# Patient Record
Sex: FEMALE | Race: WHITE | Employment: FULL TIME | URBAN - METROPOLITAN AREA
[De-identification: names, ages, dates, MRNs, and addresses within clinical notes are randomized per-mention and may not be internally consistent; named-entity substitution may affect disease eponyms.]

---

## 2017-07-03 PROBLEM — E78.2 MIXED HYPERLIPIDEMIA: Status: ACTIVE | Noted: 2017-07-03

## 2017-07-03 PROBLEM — I25.10 CORONARY ARTERY DISEASE DUE TO LIPID RICH PLAQUE: Status: ACTIVE | Noted: 2017-07-03

## 2017-07-03 PROBLEM — Z98.61 S/P PTCA (PERCUTANEOUS TRANSLUMINAL CORONARY ANGIOPLASTY): Status: ACTIVE | Noted: 2017-07-03

## 2017-07-03 PROBLEM — R07.9 CHEST PAIN: Status: ACTIVE | Noted: 2017-07-03

## 2017-07-03 PROBLEM — I25.83 CORONARY ARTERY DISEASE DUE TO LIPID RICH PLAQUE: Status: ACTIVE | Noted: 2017-07-03

## 2017-07-03 PROBLEM — Z76.89 ENCOUNTER TO ESTABLISH CARE: Status: ACTIVE | Noted: 2017-07-03

## 2017-07-24 PROBLEM — R60.0 LOCALIZED EDEMA: Status: ACTIVE | Noted: 2017-07-24

## 2017-07-24 PROBLEM — R06.02 SOB (SHORTNESS OF BREATH): Status: ACTIVE | Noted: 2017-07-24

## 2017-07-24 PROBLEM — E78.5 DYSLIPIDEMIA: Status: ACTIVE | Noted: 2017-07-24

## 2017-07-27 RX ORDER — AA/PROT/LYSINE/METHIO/VIT C/B6 50-12.5 MG
1 TABLET ORAL DAILY
Status: ON HOLD | COMMUNITY
End: 2019-05-19

## 2017-07-27 NOTE — PROGRESS NOTES
Patient pre-assessment complete for Jina Landers scheduled for [facility], arrival time 0900 am. Patient verified using . Patient instructed to bring all home medications in labeled bottles on the day of procedure. NPO status reinforced. Patient informed to take a full dose aspirin 325mg  or 81 mg x 4 on the day of procedure. Instructed they can take all other medications excluding vitamins & supplements. Patient verbalizes understanding of all instructions & denies any questions at this time.

## 2017-07-28 ENCOUNTER — HOSPITAL ENCOUNTER (OUTPATIENT)
Dept: CARDIAC CATH/INVASIVE PROCEDURES | Age: 57
Setting detail: OBSERVATION
Discharge: HOME | End: 2017-07-29
Attending: INTERNAL MEDICINE | Admitting: INTERNAL MEDICINE
Payer: SELF-PAY

## 2017-07-28 ENCOUNTER — APPOINTMENT (OUTPATIENT)
Dept: ULTRASOUND IMAGING | Age: 57
End: 2017-07-28
Attending: INTERNAL MEDICINE
Payer: SELF-PAY

## 2017-07-28 PROBLEM — M79.606 LEG PAIN: Status: ACTIVE | Noted: 2017-07-28

## 2017-07-28 LAB
ACT BLD: 285 SECS (ref 70–128)
ANION GAP BLD CALC-SCNC: 8 MMOL/L (ref 7–16)
BUN SERPL-MCNC: 15 MG/DL (ref 6–23)
CALCIUM SERPL-MCNC: 8.7 MG/DL (ref 8.3–10.4)
CHLORIDE SERPL-SCNC: 107 MMOL/L (ref 98–107)
CO2 SERPL-SCNC: 29 MMOL/L (ref 21–32)
CREAT SERPL-MCNC: 0.83 MG/DL (ref 0.6–1)
ERYTHROCYTE [DISTWIDTH] IN BLOOD BY AUTOMATED COUNT: 14.5 % (ref 11.9–14.6)
GLUCOSE SERPL-MCNC: 108 MG/DL (ref 65–100)
HCT VFR BLD AUTO: 35.8 % (ref 35.8–46.3)
HGB BLD-MCNC: 12.3 G/DL (ref 11.7–15.4)
INR PPP: 0.9 (ref 0.9–1.2)
MCH RBC QN AUTO: 27.7 PG (ref 26.1–32.9)
MCHC RBC AUTO-ENTMCNC: 34.4 G/DL (ref 31.4–35)
MCV RBC AUTO: 80.6 FL (ref 79.6–97.8)
PLATELET # BLD AUTO: 208 K/UL (ref 150–450)
PMV BLD AUTO: 9 FL (ref 10.8–14.1)
POTASSIUM SERPL-SCNC: 3.7 MMOL/L (ref 3.5–5.1)
PROTHROMBIN TIME: 10.2 SEC (ref 9.6–12)
RBC # BLD AUTO: 4.44 M/UL (ref 4.05–5.25)
SODIUM SERPL-SCNC: 144 MMOL/L (ref 136–145)
WBC # BLD AUTO: 6.1 K/UL (ref 4.3–11.1)

## 2017-07-28 PROCEDURE — 74011250637 HC RX REV CODE- 250/637: Performed by: INTERNAL MEDICINE

## 2017-07-28 PROCEDURE — 74011636320 HC RX REV CODE- 636/320: Performed by: INTERNAL MEDICINE

## 2017-07-28 PROCEDURE — C1769 GUIDE WIRE: HCPCS

## 2017-07-28 PROCEDURE — 74011250636 HC RX REV CODE- 250/636: Performed by: INTERNAL MEDICINE

## 2017-07-28 PROCEDURE — 77030012678 HC VLV HEMSTAS ABBT -B

## 2017-07-28 PROCEDURE — 74011250636 HC RX REV CODE- 250/636

## 2017-07-28 PROCEDURE — 77030004558 HC CATH ANGI DX SUPR TORQ CARD -A

## 2017-07-28 PROCEDURE — 93571 IV DOP VEL&/PRESS C FLO 1ST: CPT

## 2017-07-28 PROCEDURE — 99153 MOD SED SAME PHYS/QHP EA: CPT

## 2017-07-28 PROCEDURE — 85347 COAGULATION TIME ACTIVATED: CPT

## 2017-07-28 PROCEDURE — 74011000258 HC RX REV CODE- 258: Performed by: INTERNAL MEDICINE

## 2017-07-28 PROCEDURE — 77030004534 HC CATH ANGI DX INFN CARD -A

## 2017-07-28 PROCEDURE — 93458 L HRT ARTERY/VENTRICLE ANGIO: CPT

## 2017-07-28 PROCEDURE — C1760 CLOSURE DEV, VASC: HCPCS

## 2017-07-28 PROCEDURE — 99152 MOD SED SAME PHYS/QHP 5/>YRS: CPT

## 2017-07-28 PROCEDURE — 74011000250 HC RX REV CODE- 250: Performed by: INTERNAL MEDICINE

## 2017-07-28 PROCEDURE — 93926 LOWER EXTREMITY STUDY: CPT

## 2017-07-28 PROCEDURE — 85610 PROTHROMBIN TIME: CPT | Performed by: INTERNAL MEDICINE

## 2017-07-28 PROCEDURE — C1887 CATHETER, GUIDING: HCPCS

## 2017-07-28 PROCEDURE — 99218 HC RM OBSERVATION: CPT

## 2017-07-28 PROCEDURE — 85027 COMPLETE CBC AUTOMATED: CPT | Performed by: INTERNAL MEDICINE

## 2017-07-28 PROCEDURE — 77030004559 HC CATH ANGI DX SUPT CARD -B

## 2017-07-28 PROCEDURE — C1894 INTRO/SHEATH, NON-LASER: HCPCS

## 2017-07-28 PROCEDURE — 80048 BASIC METABOLIC PNL TOTAL CA: CPT | Performed by: INTERNAL MEDICINE

## 2017-07-28 PROCEDURE — 76937 US GUIDE VASCULAR ACCESS: CPT

## 2017-07-28 PROCEDURE — 77030013687 HC GD NDL BARD -B

## 2017-07-28 RX ORDER — MIDAZOLAM HYDROCHLORIDE 1 MG/ML
.5-5 INJECTION, SOLUTION INTRAMUSCULAR; INTRAVENOUS
Status: DISCONTINUED | OUTPATIENT
Start: 2017-07-28 | End: 2017-07-28

## 2017-07-28 RX ORDER — SODIUM CHLORIDE 0.9 % (FLUSH) 0.9 %
5-10 SYRINGE (ML) INJECTION AS NEEDED
Status: DISCONTINUED | OUTPATIENT
Start: 2017-07-28 | End: 2017-07-28

## 2017-07-28 RX ORDER — SODIUM CHLORIDE 0.9 % (FLUSH) 0.9 %
5-10 SYRINGE (ML) INJECTION AS NEEDED
Status: DISCONTINUED | OUTPATIENT
Start: 2017-07-28 | End: 2017-07-29 | Stop reason: HOSPADM

## 2017-07-28 RX ORDER — SODIUM CHLORIDE 0.9 % (FLUSH) 0.9 %
5-10 SYRINGE (ML) INJECTION EVERY 8 HOURS
Status: DISCONTINUED | OUTPATIENT
Start: 2017-07-28 | End: 2017-07-28

## 2017-07-28 RX ORDER — HYDROCODONE BITARTRATE AND ACETAMINOPHEN 5; 325 MG/1; MG/1
1 TABLET ORAL
Status: DISCONTINUED | OUTPATIENT
Start: 2017-07-28 | End: 2017-07-28

## 2017-07-28 RX ORDER — LIDOCAINE HYDROCHLORIDE 20 MG/ML
1-20 INJECTION, SOLUTION INFILTRATION; PERINEURAL
Status: DISCONTINUED | OUTPATIENT
Start: 2017-07-28 | End: 2017-07-28

## 2017-07-28 RX ORDER — DIAZEPAM 5 MG/1
5 TABLET ORAL ONCE
Status: COMPLETED | OUTPATIENT
Start: 2017-07-28 | End: 2017-07-28

## 2017-07-28 RX ORDER — CLOPIDOGREL BISULFATE 75 MG/1
75 TABLET ORAL DAILY
Status: DISCONTINUED | OUTPATIENT
Start: 2017-07-29 | End: 2017-07-29 | Stop reason: HOSPADM

## 2017-07-28 RX ORDER — GUAIFENESIN 100 MG/5ML
81-324 LIQUID (ML) ORAL ONCE
Status: DISCONTINUED | OUTPATIENT
Start: 2017-07-28 | End: 2017-07-28

## 2017-07-28 RX ORDER — OXYCODONE AND ACETAMINOPHEN 5; 325 MG/1; MG/1
1 TABLET ORAL ONCE
Status: COMPLETED | OUTPATIENT
Start: 2017-07-28 | End: 2017-07-28

## 2017-07-28 RX ORDER — SODIUM CHLORIDE 9 MG/ML
75 INJECTION, SOLUTION INTRAVENOUS CONTINUOUS
Status: DISCONTINUED | OUTPATIENT
Start: 2017-07-28 | End: 2017-07-28

## 2017-07-28 RX ORDER — ACETAMINOPHEN 325 MG/1
650 TABLET ORAL
Status: DISCONTINUED | OUTPATIENT
Start: 2017-07-28 | End: 2017-07-28

## 2017-07-28 RX ORDER — SODIUM CHLORIDE 0.9 % (FLUSH) 0.9 %
5-10 SYRINGE (ML) INJECTION EVERY 8 HOURS
Status: DISCONTINUED | OUTPATIENT
Start: 2017-07-28 | End: 2017-07-29 | Stop reason: HOSPADM

## 2017-07-28 RX ORDER — TRAZODONE HYDROCHLORIDE 50 MG/1
100 TABLET ORAL
Status: DISCONTINUED | OUTPATIENT
Start: 2017-07-28 | End: 2017-07-29 | Stop reason: HOSPADM

## 2017-07-28 RX ORDER — ESCITALOPRAM OXALATE 10 MG/1
10 TABLET ORAL DAILY
Status: DISCONTINUED | OUTPATIENT
Start: 2017-07-29 | End: 2017-07-29 | Stop reason: HOSPADM

## 2017-07-28 RX ORDER — HEPARIN SODIUM 10000 [USP'U]/ML
40-80 INJECTION, SOLUTION INTRAVENOUS; SUBCUTANEOUS
Status: DISCONTINUED | OUTPATIENT
Start: 2017-07-28 | End: 2017-07-28

## 2017-07-28 RX ORDER — IBUPROFEN 800 MG/1
800 TABLET ORAL 3 TIMES DAILY
Status: DISCONTINUED | OUTPATIENT
Start: 2017-07-28 | End: 2017-07-29 | Stop reason: HOSPADM

## 2017-07-28 RX ORDER — ONDANSETRON 2 MG/ML
4 INJECTION INTRAMUSCULAR; INTRAVENOUS
Status: DISCONTINUED | OUTPATIENT
Start: 2017-07-28 | End: 2017-07-28

## 2017-07-28 RX ORDER — FENTANYL CITRATE 50 UG/ML
25-100 INJECTION, SOLUTION INTRAMUSCULAR; INTRAVENOUS
Status: DISCONTINUED | OUTPATIENT
Start: 2017-07-28 | End: 2017-07-28

## 2017-07-28 RX ORDER — HEPARIN SODIUM 200 [USP'U]/100ML
3 INJECTION, SOLUTION INTRAVENOUS CONTINUOUS
Status: DISCONTINUED | OUTPATIENT
Start: 2017-07-28 | End: 2017-07-28

## 2017-07-28 RX ORDER — ACETAMINOPHEN 325 MG/1
650 TABLET ORAL
Status: DISCONTINUED | OUTPATIENT
Start: 2017-07-28 | End: 2017-07-29 | Stop reason: HOSPADM

## 2017-07-28 RX ORDER — NITROGLYCERIN 0.4 MG/1
0.4 TABLET SUBLINGUAL
Status: DISCONTINUED | OUTPATIENT
Start: 2017-07-28 | End: 2017-07-29 | Stop reason: HOSPADM

## 2017-07-28 RX ORDER — OXYCODONE AND ACETAMINOPHEN 10; 325 MG/1; MG/1
1 TABLET ORAL
Status: DISCONTINUED | OUTPATIENT
Start: 2017-07-28 | End: 2017-07-29 | Stop reason: HOSPADM

## 2017-07-28 RX ORDER — ASPIRIN 325 MG
325 TABLET ORAL DAILY
Status: DISCONTINUED | OUTPATIENT
Start: 2017-07-29 | End: 2017-07-29 | Stop reason: HOSPADM

## 2017-07-28 RX ADMIN — MIDAZOLAM HYDROCHLORIDE 2 MG: 1 INJECTION, SOLUTION INTRAMUSCULAR; INTRAVENOUS at 10:52

## 2017-07-28 RX ADMIN — MIDAZOLAM HYDROCHLORIDE 1 MG: 1 INJECTION, SOLUTION INTRAMUSCULAR; INTRAVENOUS at 10:58

## 2017-07-28 RX ADMIN — FENTANYL CITRATE 25 MCG: 50 INJECTION, SOLUTION INTRAMUSCULAR; INTRAVENOUS at 11:24

## 2017-07-28 RX ADMIN — FENTANYL CITRATE 50 MCG: 50 INJECTION, SOLUTION INTRAMUSCULAR; INTRAVENOUS at 10:52

## 2017-07-28 RX ADMIN — FENTANYL CITRATE 25 MCG: 50 INJECTION, SOLUTION INTRAMUSCULAR; INTRAVENOUS at 10:58

## 2017-07-28 RX ADMIN — HEPARIN SODIUM 6000 UNITS: 10000 INJECTION, SOLUTION INTRAVENOUS; SUBCUTANEOUS at 11:07

## 2017-07-28 RX ADMIN — Medication 10 ML: at 22:16

## 2017-07-28 RX ADMIN — OXYCODONE HYDROCHLORIDE AND ACETAMINOPHEN 1 TABLET: 5; 325 TABLET ORAL at 14:44

## 2017-07-28 RX ADMIN — SODIUM CHLORIDE 75 ML/HR: 900 INJECTION, SOLUTION INTRAVENOUS at 09:32

## 2017-07-28 RX ADMIN — IBUPROFEN 800 MG: 800 TABLET ORAL at 22:16

## 2017-07-28 RX ADMIN — ADENOSINE 140 MCG/KG/MIN: 3 INJECTION, SOLUTION INTRAVENOUS at 11:29

## 2017-07-28 RX ADMIN — MIDAZOLAM HYDROCHLORIDE 1 MG: 1 INJECTION, SOLUTION INTRAMUSCULAR; INTRAVENOUS at 11:24

## 2017-07-28 RX ADMIN — HYDROCODONE BITARTRATE AND ACETAMINOPHEN 1 TABLET: 5; 325 TABLET ORAL at 14:37

## 2017-07-28 RX ADMIN — IOPAMIDOL 110 ML: 755 INJECTION, SOLUTION INTRAVENOUS at 11:42

## 2017-07-28 RX ADMIN — HEPARIN SODIUM 3 ML/HR: 200 INJECTION, SOLUTION INTRAVENOUS at 10:40

## 2017-07-28 RX ADMIN — Medication 5 ML: at 18:13

## 2017-07-28 RX ADMIN — TRAZODONE HYDROCHLORIDE 100 MG: 50 TABLET ORAL at 22:15

## 2017-07-28 RX ADMIN — DIAZEPAM 5 MG: 5 TABLET ORAL at 09:38

## 2017-07-28 RX ADMIN — LIDOCAINE HYDROCHLORIDE 200 MG: 20 INJECTION, SOLUTION INFILTRATION; PERINEURAL at 10:39

## 2017-07-28 NOTE — IP AVS SNAPSHOT
Олег Benson 
 
 
 [address] 
[phone] Patient: Kellie Sanford MRN: [id number] ERT:1/75/3279 You are allergic to the following Allergen Reactions Lortab (Hydrocodone-Acetaminophen) Rash Pcn (Penicillins) Rash Recent Documentation Height Weight Breastfeeding? BMI Smoking Status 1.626 m 85 kg No 32.18 kg/m2 Never Smoker Unresulted Labs Order Current Status METABOLIC PANEL, BASIC In process Emergency Contacts Name Discharge Info Relation Home Work Mobile Carlos Hobbs  Spouse [3] [phone] About your hospitalization You were admitted on:  July 28, 2017 You last received care in the:  [facility] 3 TELEMETRY You were discharged on:  July 29, 2017 Unit phone number:  [phone] Why you were hospitalized Your primary diagnosis was:  Not on File Your diagnoses also included:  Chest Pain, Leg Pain Providers Seen During Your Hospitalizations Provider Role Specialty Primary office phone Chick MD Jonahtan Attending Provider Cardiology [phone] Your Primary Care Physician (PCP) Primary Care Physician Office Phone Office Fax [address]53 [address] [phone] Follow-up Information Follow up With Details Comments Contact Info Chel Castillo MD  Follow up as previously scheduled with Dr Bakari Cross on August 25th at 2:45pm. if you have questions or concerns call the office [facility]  [address] [place] [address] 
[phone] Delia Portillo MD Schedule an appointment as soon as possible for a visit  [address] 
[phone] Your Appointments Friday August 25, 2017  2:45 PM EDT HOSPITAL FOLLOW-UP with Chel Castillo MD  
[facility] Card[facility] ([facility]) [address] [place] [address]  
[phone] Current Discharge Medication List  
  
START taking these medications Dose & Instructions Dispensing Information Comments Morning Noon Evening Bedtime  
 ibuprofen 800 mg tablet Commonly known as:  MOTRIN Your last dose was: Your next dose is:    
   
   
 Dose:  800 mg Take 1 Tab by mouth three (3) times daily for 7 days. Quantity:  21 Tab Refills:  0  
     
   
   
   
  
 pantoprazole 20 mg tablet Commonly known as:  PROTONIX Your last dose was: Your next dose is:    
   
   
 Dose:  20 mg Take 1 Tab by mouth daily. Quantity:  14 Tab Refills:  0 CONTINUE these medications which have NOT CHANGED Dose & Instructions Dispensing Information Comments Morning Noon Evening Bedtime  
 aspirin 325 mg tablet Commonly known as:  ASPIRIN Your last dose was: Your next dose is:    
   
   
 Dose:  325 mg Take 325 mg by mouth daily. Refills:  0  
     
   
   
   
  
 CO Q-10 10 mg Cap Generic drug:  coenzyme q10 Your last dose was: Your next dose is:    
   
   
 Dose:  1 Tab Take 1 Tab by mouth daily. Refills:  0 LEXAPRO 10 mg tablet Generic drug:  escitalopram oxalate Your last dose was: Your next dose is:    
   
   
 Dose:  10 mg Take 10 mg by mouth daily. Refills:  0  
     
   
   
   
  
 nitroglycerin 0.4 mg SL tablet Commonly known as:  NITROSTAT Your last dose was: Your next dose is:    
   
   
 Dose:  0.4 mg  
1 Tab by SubLINGual route every five (5) minutes as needed for Chest Pain. Quantity:  30 Tab Refills:  5 PLAVIX 75 mg Tab Generic drug:  clopidogrel Your last dose was: Your next dose is: Take  by mouth. Refills:  0  
     
   
   
   
  
 traZODone 100 mg tablet Commonly known as:  Mendoza Castro Your last dose was: Your next dose is: Dose:  100 mg Take 100 mg by mouth nightly. Indications: Pt takes 2 tablets nightly Refills:  0 Where to Get Your Medications Information on where to get these meds will be given to you by the nurse or doctor. ! Ask your nurse or doctor about these medications  
  ibuprofen 800 mg tablet  
 pantoprazole 20 mg tablet Discharge Instructions HEART CATHETERIZATION/ANGIOGRAPHY DISCHARGE INSTRUCTIONS 1. Check puncture site frequently for swelling or bleeding. If there is any bleeding, lie down and apply pressure over the area with a clean towel or washcloth. Notify your doctor for any redness, swelling, drainage, or oozing from the puncture site. Notify your doctor for any fever or chills. 2. If the extremity becomes cold, numb, or painful call [facility] Cardiology at [phone]. 
3. Activity should be limited for the next 48 hours. Climb stairs as little as possible and avoid any stooping, bending, or strenuous activity for 48 hours. No heavy lifting (anything over 10 pounds) for 3 days. 4. You may resume your usual diet. Drink more fluids than usual. 
5. Have a responsible person drive you home and stay with you for at least 24 hours after your heart catheterization/angiography. 6. You may remove bandage from your Right Groin in 24 hours. You may shower in 24 hours. No tub baths, hot tubs, or swimming for 1 week. Do not place any lotions, creams, powders, or ointments over puncture site for 1 week. You may place a clean band-aid over the puncture site each day for 5 days. Change daily. I have read the above instructions and have had the opportunity to ask questions. Cardiac Catheterization/Angiography Discharge Instructions *Check the puncture site frequently for swelling or bleeding.  If you see any bleeding, lie down and apply pressure over the area with a clean town or washcloth. Notify your doctor for any redness, swelling, drainage or oozing from the puncture site. Notify your doctor for any fever or chills. *If the leg or arm with the puncture becomes cold, numb or painful, call [facility] Cardiology  at  [phone] *Activity should be limited for the next 48 hours. Climb stairs as little as possible and avoid any stooping, bending or strenuous activity for 48 hours. No heavy lifting (anything over 10 pounds) for three days. *Do not drive for 48 hours. *You may resume your usual diet. Drink more fluids than usual. 
 
*Have a responsible person drive you home and stay with you for at least 24 hours after your heart catheterization/angiography. *You may remove the bandage from your Right, Left, Groin and Arm in 24 hours. You may shower in 24 hours. No tub baths, hot tubs or swimming for one week. Do not place any lotions, creams, powders, ointments over the puncture site for one week. You may place a clean band-aid over the puncture site each day for 5 days. Change this daily. DISCHARGE SUMMARY from Nurse The following personal items are in your possession at time of discharge: 
 
Dental Appliances: Lowers, Uppers, With patient Visual Aid: Glasses, With patient Clothing: Pants, Shirt, Undergarments, With patient PATIENT INSTRUCTIONS: 
 
 
F-face looks uneven A-arms unable to move or move unevenly S-speech slurred or non-existent T-time-call 911 as soon as signs and symptoms begin-DO NOT go Back to bed or wait to see if you get better-TIME IS BRAIN. Warning Signs of HEART ATTACK Call 911 if you have these symptoms: 
? Chest discomfort. Most heart attacks involve discomfort in the center of the chest that lasts more than a few minutes, or that goes away and comes back. It can feel like uncomfortable pressure, squeezing, fullness, or pain. ? Discomfort in other areas of the upper body. Symptoms can include pain or discomfort in one or both arms, the back, neck, jaw, or stomach. ? Shortness of breath with or without chest discomfort. ? Other signs may include breaking out in a cold sweat, nausea, or lightheadedness. Don't wait more than five minutes to call [address]! Fast action can save your life. Calling 911 is almost always the fastest way to get lifesaving treatment. Emergency Medical Services staff can begin treatment when they arrive  up to an hour sooner than if someone gets to the hospital by car. The discharge information has been reviewed with the patient. The patient verbalized understanding. Discharge medications reviewed with the patient and appropriate educational materials and side effects teaching were provided. Discharge Orders None Science Fantasy Announcement We are excited to announce that we are making your provider's discharge notes available to you in Science Fantasy. You will see these notes when they are completed and signed by the physician that discharged you from your recent hospital stay. If you have any questions or concerns about any information you see in Science Fantasy, please call the Health Information Department where you were seen or reach out to your Primary Care Provider for more information about your plan of care. Introducing \[facility]\"" & HEALTH SERVICES! Dear Sammy Scruggs: Thank you for requesting a Science Fantasy account. Our records indicate that you already have an active Science Fantasy account. You can access your account anytime at [email or web address] Did you know that you can access your hospital and ER discharge instructions at any time in Science Fantasy? You can also review all of your test results from your hospital stay or ER visit. Additional Information If you have questions, please visit the Frequently Asked Questions section of the [facility] website at [email or web address] Remember, MyChart is NOT to be used for urgent needs. For medical emergencies, dial 911. Now available from your iPhone and Android! General Information Please provide this summary of care documentation to your next provider. Patient Signature:  ____________________________________________________________ Date:  ____________________________________________________________  
  
Daniela Piety Provider Signature:  ____________________________________________________________ Date:  ____________________________________________________________

## 2017-07-28 NOTE — PROGRESS NOTES
Patient with severe pain at cath site. Unable to ambulate. Ultrasound normal.  Good pulses. Feels she cannot go home. Will admit and start anti-inflammatory medications.   Hopefully home in AM.     Quinn Lesches, MD

## 2017-07-28 NOTE — PROCEDURES
Brief Cardiac Procedure Note    Patient: Jhonny Ly MRN: [id number]  SSN: [id number]    YOB: 1960  Age: 65 y.o. Sex: female      Date of Procedure: 7/28/2017     Pre-procedure Diagnosis: Chest pain     Post-procedure Diagnosis: Non-cardiac Chest Pain    Procedure: Left Heart Catheterization    Brief Description of Procedure: As above    Performed By: Nusrat Trejo MD     Assistants: None    Anesthesia: Moderate Sedation    Estimated Blood Loss: Less than 10 mL      Specimens: None    Implants: None    Findings: 40-50% diffuse prox LAD around stent. Moderate non-obstructive CAD. Normal EF    Complications: None    Recommendations: Continue medical therapy.     Signed By: Nusrat Trejo MD     July 28, 2017

## 2017-07-28 NOTE — PROGRESS NOTES
Patient received to [address] [place] [address] room # 7  Ambulatory from [facility]. Patient scheduled for [facility] today with Dr Desmond Dong. Procedure reviewed & questions answered, voiced good understanding consent obtained & placed on chart. All medications and medical history reviewed. Will prep patient per orders. Patient & family updated on plan of care.

## 2017-07-28 NOTE — PROGRESS NOTES
Report received from [facility] Cath Lab RN. Procedural findings communicated. Intra procedural  medication administration reviewed. Progression of care discussed.      Patient received into [address] [facility] 5 post sheath removal.     Access site without bleeding or swelling     Dressing dry and intact     Patient instructed to limit movement to right lower extremity    Routine post procedural vital signs and site assessment initiated

## 2017-07-28 NOTE — PROGRESS NOTES
Patient with complaints of right groin pain. Site palpated no bleeding or hematoma noted. Eleuterio Peña previously ordered by Dr Hermes Sanchez patient states unable to take norco due to allergy (causes itching) states has taken percocet without difficulty. Dr Hermes Sanchez notified Zita Hassan to give percocet 5mg.

## 2017-07-28 NOTE — PROGRESS NOTES
TRANSFER - OUT REPORT:    Verbal report given to Ovi Glez, RN(name) on Ana Maria Manzanoget  being transferred to cpru(unit) for routine progression of care       Report consisted of patients Situation, Background, Assessment and   Recommendations(SBAR). Information from the following report(s) SBAR was reviewed with the receiving nurse. is allergic to lortab [hydrocodone-acetaminophen] and pcn [penicillins]. Opportunity for questions and clarification was provided. Procedure Summary:Pt had LHC with FFR of LAD via R groin. Site angiosealed and covered with clear dsg. Med Administration    Versed:  4 mg  Fentanyl: 100 mcg  Heparin: 6000 units      Visit Vitals    /82 (BP 1 Location: Left arm, BP Patient Position: Supine)    Pulse 79    Temp 97.8 °F (36.6 °C)    Resp 14    Ht 5' 4\" (1.626 m)    Wt 81.6 kg (180 lb)    SpO2 95%    Breastfeeding No    BMI 30.9 kg/m2     Past Medical History:   Diagnosis Date    CAD (coronary artery disease)     Psychiatric disorder            Peripheral IV 07/28/17 Left Antecubital (Active)   Site Assessment Clean, dry, & intact; Clean;Dry 7/28/2017  9:40 AM   Phlebitis Assessment 0 7/28/2017  9:40 AM   Infiltration Assessment 0 7/28/2017  9:40 AM   Dressing Status Clean, dry, & intact; Clean;Dry 7/28/2017  9:40 AM   Dressing Type Tape;Transparent 7/28/2017  9:40 AM   Hub Color/Line Status Patent; Infusing;Pink 7/28/2017  9:40 AM

## 2017-07-28 NOTE — PROGRESS NOTES
Patient received to [address] [place] [address] room # 7  Ambulatory from [facility]. Patient scheduled for [facility] today with Dr Cassy Frias. Procedure reviewed & questions answered, voiced good understanding consent obtained & placed on chart. All medications and medical history reviewed. Will prep patient per orders. Patient & family updated on plan of care.

## 2017-07-28 NOTE — IP AVS SNAPSHOT
[facility] 
 
 
 [address] 
[phone] Patient: Zhang Clinton MRN: [id number] ICF:8/26/3129 Current Discharge Medication List  
  
START taking these medications Dose & Instructions Dispensing Information Comments Morning Noon Evening Bedtime  
 ibuprofen 800 mg tablet Commonly known as:  MOTRIN Your last dose was: Your next dose is:    
   
   
 Dose:  800 mg Take 1 Tab by mouth three (3) times daily for 7 days. Quantity:  21 Tab Refills:  0  
     
   
   
   
  
 pantoprazole 20 mg tablet Commonly known as:  PROTONIX Your last dose was: Your next dose is:    
   
   
 Dose:  20 mg Take 1 Tab by mouth daily. Quantity:  14 Tab Refills:  0 CONTINUE these medications which have NOT CHANGED Dose & Instructions Dispensing Information Comments Morning Noon Evening Bedtime  
 aspirin 325 mg tablet Commonly known as:  ASPIRIN Your last dose was: Your next dose is:    
   
   
 Dose:  325 mg Take 325 mg by mouth daily. Refills:  0  
     
   
   
   
  
 CO Q-10 10 mg Cap Generic drug:  coenzyme q10 Your last dose was: Your next dose is:    
   
   
 Dose:  1 Tab Take 1 Tab by mouth daily. Refills:  0 LEXAPRO 10 mg tablet Generic drug:  escitalopram oxalate Your last dose was: Your next dose is:    
   
   
 Dose:  10 mg Take 10 mg by mouth daily. Refills:  0  
     
   
   
   
  
 nitroglycerin 0.4 mg SL tablet Commonly known as:  NITROSTAT Your last dose was: Your next dose is:    
   
   
 Dose:  0.4 mg  
1 Tab by SubLINGual route every five (5) minutes as needed for Chest Pain. Quantity:  30 Tab Refills:  5 PLAVIX 75 mg Tab Generic drug:  clopidogrel Your last dose was: Your next dose is: Take  by mouth. Refills:  0  
     
   
   
   
  
 traZODone 100 mg tablet Commonly known as:  Rayma Medal Your last dose was: Your next dose is:    
   
   
 Dose:  100 mg Take 100 mg by mouth nightly. Indications: Pt takes 2 tablets nightly Refills:  0 Where to Get Your Medications Information on where to get these meds will be given to you by the nurse or doctor. ! Ask your nurse or doctor about these medications  
  ibuprofen 800 mg tablet  
 pantoprazole 20 mg tablet

## 2017-07-28 NOTE — IP AVS SNAPSHOT
[address] 
[phone] Patient: Aneudy Kidd MRN: [id number] HNJ:[birth year] Discharge Summary 7/28/2017 Aneudy Kidd MRN[de-identified]  [id number] Admission Information Provider Pager Service Admission Date Expected D/C Date Kelly Yusuf MD  CARDIAC CATH LAB 7/28/2017 Actual LOS Patient Class 0 days OUTPATIENT Follow-up Information Follow up With Details Comments Contact Info Shakira Brock MD  Follow up as previously scheduled with Dr Trever Daniel on August 25th at 2:45pm. if you have questions or concerns call the office [address] [address] 
[phone] Current Discharge Medication List  
  
ASK your doctor about these medications Dose & Instructions Dispensing Information Comments Morning Noon Evening Bedtime  
 aspirin 325 mg tablet Commonly known as:  ASPIRIN Your last dose was: Your next dose is:    
   
   
 Dose:  325 mg Take 325 mg by mouth daily. Refills:  0  
     
   
   
   
  
 CO Q-10 10 mg Cap Generic drug:  coenzyme q10 Your last dose was: Your next dose is:    
   
   
 Dose:  1 Tab Take 1 Tab by mouth daily. Refills:  0 LEXAPRO 10 mg tablet Generic drug:  escitalopram oxalate Your last dose was: Your next dose is:    
   
   
 Dose:  10 mg Take 10 mg by mouth daily. Refills:  0  
     
   
   
   
  
 nitroglycerin 0.4 mg SL tablet Commonly known as:  NITROSTAT Your last dose was: Your next dose is:    
   
   
 Dose:  0.4 mg  
1 Tab by SubLINGual route every five (5) minutes as needed for Chest Pain. Quantity:  30 Tab Refills:  5 PLAVIX 75 mg Tab Generic drug:  clopidogrel Your last dose was: Your next dose is: Take  by mouth. Refills:  0  
     
   
   
   
  
 traZODone 100 mg tablet Commonly known as:  Alyce Rodriguez Your last dose was: Your next dose is:    
   
   
 Dose:  100 mg Take 100 mg by mouth nightly. Indications: Pt takes 2 tablets nightly Refills:  0 General Information Please provide this summary of care documentation to your next provider. Allergies Unspecified:  Lortab [Hydrocodone-acetaminophen];  Pcn [Penicillins] Current Immunizations  Never Reviewed No immunizations on file. Discharge Instructions Discharge Instructions HEART CATHETERIZATION/ANGIOGRAPHY DISCHARGE INSTRUCTIONS 1. Check puncture site frequently for swelling or bleeding. If there is any bleeding, lie down and apply pressure over the area with a clean towel or washcloth. Notify your doctor for any redness, swelling, drainage, or oozing from the puncture site. Notify your doctor for any fever or chills. 2. If the extremity becomes cold, numb, or painful call [facility] Cardiology at [phone]. 
3. Activity should be limited for the next 48 hours. Climb stairs as little as possible and avoid any stooping, bending, or strenuous activity for 48 hours. No heavy lifting (anything over 10 pounds) for 3 days. 4. You may resume your usual diet. Drink more fluids than usual. 
5. Have a responsible person drive you home and stay with you for at least 24 hours after your heart catheterization/angiography. 6. You may remove bandage from your Right Groin in 24 hours. You may shower in 24 hours. No tub baths, hot tubs, or swimming for 1 week. Do not place any lotions, creams, powders, or ointments over puncture site for 1 week. You may place a clean band-aid over the puncture site each day for 5 days. Change daily. I have read the above instructions and have had the opportunity to ask questions. Discharge Orders  None  
  
`  
  
    
    
 Patient Signature:  ____________________________________________________________ Date:  ____________________________________________________________  
  
 Edrie Gunnels Provider Signature:  ____________________________________________________________ Date:  ____________________________________________________________

## 2017-07-28 NOTE — PROGRESS NOTES
Patient ambulated to bathroom at 1510. Patient with complaints of severe groin pain, difficulty returning to room/bed and patient started to tremble and began to cry. Dr Sang Villavicencio notified. Order given for patient to have ultrasound of right groin.  Site remains soft to touch with no bleeding or hematoma

## 2017-07-28 NOTE — PROGRESS NOTES
TRANSFER - IN REPORT:    Verbal report received from MERLIN Michaels on Jose Macedo being received from [facility] for routine progression of care. Report consisted of patients Situation, Background, Assessment and Recommendations(SBAR). Information from the following reports was reviewed: Kardex, Procedure Summary, MAR and Recent Results. Opportunity for questions and clarification was provided. Assessment completed upon patients arrival to unit and care assumed. Patient received to room 324 and assessment completed. Patient connected to telemetry monitor and eagle with BP cycling every 15 minutes. Patient oriented to room and plan of care reviewed. Patient voiced understanding of bedrest. Right groin site benign, dressing dry and intact, no hematoma. Patient provided with clear liquids. Patient voiced understanding to use call light to communicate needs. Admission skin assessment completed with second RN and reveals the following: Right groin site soft with soft purple bruising. Patient states right groin is hurting 8/10- \"unlike any other pain I have ever had. \" Too early for additional pain medication- will monitor.

## 2017-07-28 NOTE — PROGRESS NOTES
Report called to Danial Manriquez. Patient transferred to room 324 via hospital transport. Right groin remains soft with no bleeding or  Hematoma.  Patient alert with no other complaints other than right groin pain

## 2017-07-28 NOTE — PROGRESS NOTES
Verbal bedside report given to katlin Dickey RN. Patient's situation, background, assessment and recommendations provided. Opportunity for questions provided. Oncoming RN assumed care of patient. Pt up in bathroom.

## 2017-07-28 NOTE — PROCEDURES
Zafar Torresjackie 45       Name:  Jackie Titus   MR#:  [id number]   :  1960   Account #:  [de-identified]   Date of Adm:  2017       DATE OF PROCEDURE: 2017     PROCEDURE    1. Left heart catheterization, selective coronary angiography,   left ventriculogram via the right femoral approach. 2. Fractional flow reserve measurement of the left anterior   descending. 3. AngioSeal vascular closure of arteriotomy site. INDICATIONS: Recurrent chest pain in a patient with prior PCI   and stenting of the LAD. Chest pain consistent with [place]   Cardiovascular Class 4 angina as it occurs at rest and with   exertion. Stress test was equivocal with lateral changes. Prior   history of PCI and stenting of the LAD. TECHNICAL FACTORS: After informed consent was obtained, patient   brought to the cardiac catheterization lab. The right femoral   artery was prepped and draped in the usual sterile fashion. Using   the Site-Rite ultrasound, the right common femoral artery was   identified. Using modified Seldinger technique, the right common   femoral artery was entered under Site-Rite guidance. A static   image was placed on the chart. A 6-[place] arterial sheath was   placed without difficulty. Sami Breann catheter was used to   selectively engage the ostium of the left main coronary artery   and a 3DRC catheter was used to selectively engage the ostium of   the right coronary artery. Selective injection in various   projections were performed. A pigtail catheter was used to cross   the aortic valve and enter the left ventricle. Hemodynamic   measurements and left ventriculogram were obtained. The left   ventricular aortic pressure gradient was obtained by pullback   technique. At this point, the patient was given 6000 units of heparin. ACT   was greater than 250 and a XB 3.0 guide was used to selectively   the ostium of the left main coronary artery.  There was   inadequate support, therefore this exchange for a XB 3.5 guide. A Verrata wire system was advanced into the ostium of the left   main coronary artery and appropriately normalized. This was then   placed in the mid to distal LAD. Intravenous Adenosine was given at 140   mg/kg per minute. This was given with appropriate hemodynamic   response for 2 minutes. There was intermittent AV block. FFR was   measured at 0.88 indicating hemodynamically insignificant   stenosis of the proximal LAD. Based on this, it was not felt   intervention was warranted and the wire was moved and final   projections were obtained. A right femoral arteriogram was performed that showed the sheath   was contained in the right common femoral artery. A 6-[place]   AngioSeal vascular closure device was deployed by standard   technique with excellent hemostasis. SEDATION: Patient received monitored supervised conscious   sedation with 3 mg of Versed and 75 mcg of fentanyl. This was   administered by Indiana Garcia RN. Sedation start time was 10:50   with a procedure end time of 11:44. CONTRAST: Isovue 110. HEMODYNAMIC RESULTS    1. Aortic pressure is 115/67 with a mean of 90.    2. Left ventricular end-diastolic pressure is 19.    3. There is no significant gradient across the aortic valve. ANGIOGRAPHIC RESULTS    1. Left main coronary artery: A large caliber vessel, appeared   angiographically normal.    2. LAD: A small caliber vessel. Contains a stent in the proximal   segment. There is a 40% to 50% stenosis just in front of the   stented segment extending to the LAD ostium. The remainder of the   vessel is very small caliber and approximately 2 to 2.25 mm. 3. First diagonal artery: A small caliber vessel. Contains a 30%   ostial stenosis. 4. Left circumflex: A medium caliber vessel. 20% distal stenosis   prior to termination to an obtuse marginal which is patent.     5. Right coronary artery: Large caliber, dominant vessel. 20% to   30% proximal and 30% mid stenosis. 6. Right PDA: Small caliber vessel. Patent. 7. Right posterolateral branch: Medium caliber vessel. patent. 8. Left ventriculogram performed in a FAN projection shows a   normal left ventricular systolic function, EF 83% to 65%. No   focal segmental wall motion abnormalities. The aortic root is   nondilated. 9. FFR of LAD is 0.88, indicating hemodynamically insignificant   stenosis. CONCLUSIONS    1. Patent left anterior descending stent with moderate   nonobstructive disease in other myocardial segments. Borderline   ostial left anterior descending stenosis which was showed normal   hemodynamics with fractional flow reserve measurement of 0.88. Also it should be noted that patient recently had a stress test   which showed no evidence of anterior ischemia. 2. Normal left ventricular systolic function. 3. Successful deployment of 6-[place] AngioSeal vascular device with   good hemostasis. PLAN: Followup with Dr. Selma Srinivasan in 2 weeks.          MD NILSA Ho / Mikel Marroquin   D:  07/28/2017   12:51   T:  07/28/2017   14:23   Job #:  [id number]

## 2017-07-29 VITALS
OXYGEN SATURATION: 98 % | HEART RATE: 60 BPM | SYSTOLIC BLOOD PRESSURE: 114 MMHG | RESPIRATION RATE: 16 BRPM | BODY MASS INDEX: 32.01 KG/M2 | TEMPERATURE: 97.5 F | DIASTOLIC BLOOD PRESSURE: 63 MMHG | HEIGHT: 64 IN | WEIGHT: 187.5 LBS

## 2017-07-29 LAB
ANION GAP BLD CALC-SCNC: 7 MMOL/L (ref 7–16)
BUN SERPL-MCNC: 17 MG/DL (ref 6–23)
CALCIUM SERPL-MCNC: 8.7 MG/DL (ref 8.3–10.4)
CHLORIDE SERPL-SCNC: 110 MMOL/L (ref 98–107)
CO2 SERPL-SCNC: 27 MMOL/L (ref 21–32)
CREAT SERPL-MCNC: 0.86 MG/DL (ref 0.6–1)
ERYTHROCYTE [DISTWIDTH] IN BLOOD BY AUTOMATED COUNT: 14.5 % (ref 11.9–14.6)
GLUCOSE SERPL-MCNC: 122 MG/DL (ref 65–100)
HCT VFR BLD AUTO: 34.6 % (ref 35.8–46.3)
HGB BLD-MCNC: 11.5 G/DL (ref 11.7–15.4)
MCH RBC QN AUTO: 27.2 PG (ref 26.1–32.9)
MCHC RBC AUTO-ENTMCNC: 33.2 G/DL (ref 31.4–35)
MCV RBC AUTO: 81.8 FL (ref 79.6–97.8)
PLATELET # BLD AUTO: 183 K/UL (ref 150–450)
PMV BLD AUTO: 9.2 FL (ref 10.8–14.1)
POTASSIUM SERPL-SCNC: 3.9 MMOL/L (ref 3.5–5.1)
RBC # BLD AUTO: 4.23 M/UL (ref 4.05–5.25)
SODIUM SERPL-SCNC: 144 MMOL/L (ref 136–145)
WBC # BLD AUTO: 6.2 K/UL (ref 4.3–11.1)

## 2017-07-29 PROCEDURE — 80048 BASIC METABOLIC PNL TOTAL CA: CPT | Performed by: INTERNAL MEDICINE

## 2017-07-29 PROCEDURE — 85027 COMPLETE CBC AUTOMATED: CPT | Performed by: INTERNAL MEDICINE

## 2017-07-29 PROCEDURE — 74011250637 HC RX REV CODE- 250/637: Performed by: INTERNAL MEDICINE

## 2017-07-29 PROCEDURE — 99218 HC RM OBSERVATION: CPT

## 2017-07-29 PROCEDURE — 36415 COLL VENOUS BLD VENIPUNCTURE: CPT | Performed by: INTERNAL MEDICINE

## 2017-07-29 RX ORDER — IBUPROFEN 800 MG/1
800 TABLET ORAL 3 TIMES DAILY
Qty: 21 TAB | Refills: 0 | Status: SHIPPED | OUTPATIENT
Start: 2017-07-29 | End: 2017-08-05

## 2017-07-29 RX ORDER — PANTOPRAZOLE SODIUM 20 MG/1
20 TABLET, DELAYED RELEASE ORAL DAILY
Qty: 14 TAB | Refills: 0 | Status: ON HOLD | OUTPATIENT
Start: 2017-07-29 | End: 2019-05-19

## 2017-07-29 RX ADMIN — ASPIRIN 325 MG ORAL TABLET 325 MG: 325 PILL ORAL at 08:47

## 2017-07-29 RX ADMIN — IBUPROFEN 800 MG: 800 TABLET ORAL at 08:47

## 2017-07-29 RX ADMIN — CLOPIDOGREL BISULFATE 75 MG: 75 TABLET ORAL at 08:48

## 2017-07-29 RX ADMIN — ESCITALOPRAM OXALATE 10 MG: 10 TABLET ORAL at 08:47

## 2017-07-29 NOTE — PROGRESS NOTES
Discharge instructions reviewed with pt. Prescriptions given for Motrin protonix and med info sheets provided for all new medications. Opportunity for questions provided. Pt voiced understanding of all discharge instructions.

## 2017-07-29 NOTE — PROGRESS NOTES
Verbal bedside report received from Leopoldo Raw, RN. Assumed care of patient. Received a call from Monitor room that pt HR was in the 40's. Nicki Morillo on the unit and aware.  Will ambulate pt to see if HR comes up or stays the same

## 2017-07-29 NOTE — PROGRESS NOTES
[facility] July 29, 2017       RE: Diana Duarte      To Whom It May Concern,    This is to certify that Diana Duarte may return to work August 2 with no restrictions. Please feel free to contact my office if you have any questions or concerns. Thank you for your assistance in this matter.       Sincerely,  Perry Veras NP

## 2017-07-29 NOTE — PROGRESS NOTES
Problem: Falls - Risk of  Goal: *Absence of falls  Outcome: Progressing Towards Goal  Fall precautions in place. Yellow socks on. Instructed to only get OOB with assistance. Voiced understanding. Call light in reach. Goal: *Knowledge of fall prevention  Outcome: Progressing Towards Goal  Pt educated on fall prevention and to use call light for assistance. Pt verbalizes understanding and will use call light for assistance.

## 2017-07-29 NOTE — PROGRESS NOTES
Bedside and Verbal shift change report given to Honora Schwab, RN (oncoming nurse) by Katie Reyes RN (offgoing nurse). Report included the following information SBAR, Kardex, Accordion and Recent Results.

## 2017-07-29 NOTE — DISCHARGE INSTRUCTIONS
HEART CATHETERIZATION/ANGIOGRAPHY DISCHARGE INSTRUCTIONS    1. Check puncture site frequently for swelling or bleeding. If there is any bleeding, lie down and apply pressure over the area with a clean towel or washcloth. Notify your doctor for any redness, swelling, drainage, or oozing from the puncture site. Notify your doctor for any fever or chills. 2. If the extremity becomes cold, numb, or painful call [address] Cardiology at [phone].  3. Activity should be limited for the next 48 hours. Climb stairs as little as possible and avoid any stooping, bending, or strenuous activity for 48 hours. No heavy lifting (anything over 10 pounds) for 3 days. 4. You may resume your usual diet. Drink more fluids than usual.  5. Have a responsible person drive you home and stay with you for at least 24 hours after your heart catheterization/angiography. 6. You may remove bandage from your Right Groin in 24 hours. You may shower in 24 hours. No tub baths, hot tubs, or swimming for 1 week. Do not place any lotions, creams, powders, or ointments over puncture site for 1 week. You may place a clean band-aid over the puncture site each day for 5 days. Change daily. I have read the above instructions and have had the opportunity to ask questions. Cardiac Catheterization/Angiography Discharge Instructions    *Check the puncture site frequently for swelling or bleeding. If you see any bleeding, lie down and apply pressure over the area with a clean town or washcloth. Notify your doctor for any redness, swelling, drainage or oozing from the puncture site. Notify your doctor for any fever or chills. *If the leg or arm with the puncture becomes cold, numb or painful, call [address] Cardiology  at  [phone]    *Activity should be limited for the next 48 hours. Climb stairs as little as possible and avoid any stooping, bending or strenuous activity for 48 hours.  No heavy lifting (anything over 10 pounds) for three days.    *Do not drive for 48 hours. *You may resume your usual diet. Drink more fluids than usual.    *Have a responsible person drive you home and stay with you for at least 24 hours after your heart catheterization/angiography. *You may remove the bandage from your Right, Left, Groin and Arm in 24 hours. You may shower in 24 hours. No tub baths, hot tubs or swimming for one week. Do not place any lotions, creams, powders, ointments over the puncture site for one week. You may place a clean band-aid over the puncture site each day for 5 days. Change this daily. DISCHARGE SUMMARY from Nurse    The following personal items are in your possession at time of discharge:    Dental Appliances: Lowers, Uppers, With patient  Visual Aid: Glasses, With patient           Clothing: Pants, Shirt, Undergarments, With patient                PATIENT INSTRUCTIONS:    After general anesthesia or intravenous sedation, for 24 hours or while taking prescription Narcotics:  · Limit your activities  · Do not drive and operate hazardous machinery  · Do not make important personal or business decisions  · Do  not drink alcoholic beverages  · If you have not urinated within 8 hours after discharge, please contact your surgeon on call. Report the following to your surgeon:  · Excessive pain, swelling, redness or odor of or around the surgical area  · Temperature over 100.5  · Nausea and vomiting lasting longer than 4 hours or if unable to take medications  · Any signs of decreased circulation or nerve impairment to extremity: change in color, persistent  numbness, tingling, coldness or increase pain  · Any questions            *  Please give a list of your current medications to your Primary Care Provider. *  Please update this list whenever your medications are discontinued, doses are      changed, or new medications (including over-the-counter products) are added.     *  Please carry medication information at all times in case of emergency situations. These are general instructions for a healthy lifestyle:    No smoking/ No tobacco products/ Avoid exposure to second hand smoke    Surgeon General's Warning:  Quitting smoking now greatly reduces serious risk to your health. Obesity, smoking, and sedentary lifestyle greatly increases your risk for illness    A healthy diet, regular physical exercise & weight monitoring are important for maintaining a healthy lifestyle    You may be retaining fluid if you have a history of heart failure or if you experience any of the following symptoms:  Weight gain of 3 pounds or more overnight or 5 pounds in a week, increased swelling in our hands or feet or shortness of breath while lying flat in bed. Please call your doctor as soon as you notice any of these symptoms; do not wait until your next office visit. Recognize signs and symptoms of STROKE:    F-face looks uneven    A-arms unable to move or move unevenly    S-speech slurred or non-existent    T-time-call 911 as soon as signs and symptoms begin-DO NOT go       Back to bed or wait to see if you get better-TIME IS BRAIN. Warning Signs of HEART ATTACK     Call 911 if you have these symptoms:   Chest discomfort. Most heart attacks involve discomfort in the center of the chest that lasts more than a few minutes, or that goes away and comes back. It can feel like uncomfortable pressure, squeezing, fullness, or pain.  Discomfort in other areas of the upper body. Symptoms can include pain or discomfort in one or both arms, the back, neck, jaw, or stomach.  Shortness of breath with or without chest discomfort.  Other signs may include breaking out in a cold sweat, nausea, or lightheadedness. Don't wait more than five minutes to call 911 - MINUTES MATTER! Fast action can save your life. Calling 911 is almost always the fastest way to get lifesaving treatment.  Emergency Medical Services staff can begin treatment when they arrive -- up to an hour sooner than if someone gets to the hospital by car. The discharge information has been reviewed with the patient. The patient verbalized understanding. Discharge medications reviewed with the patient and appropriate educational materials and side effects teaching were provided.

## 2017-07-29 NOTE — PROGRESS NOTES
[facility] CARDIOLOGY PROGRESS NOTE           7/29/2017 7:12 AM    Admit Date: 7/28/2017      Subjective:   Groin pain improved but persist-- wants to go home    ROS:  Cardiovascular:  As noted above    Objective:      Vitals:    07/28/17 1800 07/28/17 2113 07/29/17 0047 07/29/17 0446   BP: 133/78 112/64 93/50 103/63   Pulse: 64 64 64 61   Resp: 16 18 18 18   Temp: 97.3 °F (36.3 °C) 96.8 °F (36 °C) 96.4 °F (35.8 °C) 96.5 °F (35.8 °C)   SpO2: 97% 96% 95% 97%   Weight:    85 kg (187 lb 8 oz)   Height:           Physical Exam:  General-No Acute Distress  Neck- supple, no JVD  CV- regular rate and rhythm no MRG  Lung- clear bilaterally  Abd- soft, nontender, nondistended--mild groin pain, no hematoma, no belly pain  Ext- no edema bilaterally.   Skin- warm and dry    Data Review:   Recent Labs      07/29/17   0638  07/28/17   0915   NA   --   144   K   --   3.7   BUN   --   15   CREA   --   0.83   GLU   --   108*   WBC  6.2  6.1   HGB  11.5*  12.3   HCT  34.6*  35.8   PLT  183  208   INR   --   0.9       Assessment/Plan:     Active Problems:    Chest pain (7/3/2017)--stable cath      Leg pain (7/28/2017)--US negative, continue motrin for one week           Do Chavez NP  7/29/2017 7:12 AM

## 2017-09-15 ENCOUNTER — HOSPITAL ENCOUNTER (OUTPATIENT)
Dept: MRI IMAGING | Age: 57
Discharge: HOME | End: 2017-09-15
Attending: INTERNAL MEDICINE
Payer: SELF-PAY

## 2017-09-15 DIAGNOSIS — M54.16 RADICULOPATHY, LUMBAR REGION: ICD-10-CM

## 2017-09-15 PROCEDURE — 72148 MRI LUMBAR SPINE W/O DYE: CPT

## 2019-02-12 ENCOUNTER — HOSPITAL ENCOUNTER (OUTPATIENT)
Dept: MAMMOGRAPHY | Age: 59
Discharge: HOME | End: 2019-02-12
Attending: INTERNAL MEDICINE
Payer: COMMERCIAL

## 2019-02-12 DIAGNOSIS — N64.4 BREAST PAIN: ICD-10-CM

## 2019-02-12 PROCEDURE — 77066 DX MAMMO INCL CAD BI: CPT

## 2019-02-12 PROCEDURE — 76642 ULTRASOUND BREAST LIMITED: CPT

## 2019-03-01 ENCOUNTER — APPOINTMENT (OUTPATIENT)
Dept: GENERAL RADIOLOGY | Age: 59
End: 2019-03-01
Attending: EMERGENCY MEDICINE
Payer: COMMERCIAL

## 2019-03-01 ENCOUNTER — APPOINTMENT (OUTPATIENT)
Dept: CT IMAGING | Age: 59
End: 2019-03-01
Attending: EMERGENCY MEDICINE
Payer: COMMERCIAL

## 2019-03-01 ENCOUNTER — HOSPITAL ENCOUNTER (EMERGENCY)
Age: 59
Discharge: HOME | End: 2019-03-01
Attending: EMERGENCY MEDICINE
Payer: COMMERCIAL

## 2019-03-01 VITALS
RESPIRATION RATE: 24 BRPM | WEIGHT: 180 LBS | HEART RATE: 96 BPM | TEMPERATURE: 98.4 F | BODY MASS INDEX: 30.73 KG/M2 | SYSTOLIC BLOOD PRESSURE: 109 MMHG | DIASTOLIC BLOOD PRESSURE: 63 MMHG | HEIGHT: 64 IN | OXYGEN SATURATION: 96 %

## 2019-03-01 DIAGNOSIS — M54.50 CHRONIC LOW BACK PAIN WITHOUT SCIATICA, UNSPECIFIED BACK PAIN LATERALITY: ICD-10-CM

## 2019-03-01 DIAGNOSIS — R10.13 ABDOMINAL PAIN, EPIGASTRIC: ICD-10-CM

## 2019-03-01 DIAGNOSIS — G89.29 CHRONIC LOW BACK PAIN WITHOUT SCIATICA, UNSPECIFIED BACK PAIN LATERALITY: ICD-10-CM

## 2019-03-01 DIAGNOSIS — R07.89 ATYPICAL CHEST PAIN: Primary | ICD-10-CM

## 2019-03-01 DIAGNOSIS — F41.1 ANXIETY STATE: ICD-10-CM

## 2019-03-01 LAB
ALBUMIN SERPL-MCNC: 4 G/DL (ref 3.5–5)
ALBUMIN/GLOB SERPL: 1 {RATIO} (ref 1.2–3.5)
ALP SERPL-CCNC: 91 U/L (ref 50–136)
ALT SERPL-CCNC: 18 U/L (ref 12–65)
ANION GAP SERPL CALC-SCNC: 8 MMOL/L (ref 7–16)
AST SERPL-CCNC: 13 U/L (ref 15–37)
ATRIAL RATE: 99 BPM
BASOPHILS # BLD: 0 K/UL (ref 0–0.2)
BASOPHILS NFR BLD: 0 % (ref 0–2)
BILIRUB SERPL-MCNC: 0.5 MG/DL (ref 0.2–1.1)
BUN SERPL-MCNC: 17 MG/DL (ref 6–23)
CALCIUM SERPL-MCNC: 9.8 MG/DL (ref 8.3–10.4)
CALCULATED P AXIS, ECG09: 56 DEGREES
CALCULATED R AXIS, ECG10: 48 DEGREES
CALCULATED T AXIS, ECG11: -5 DEGREES
CHLORIDE SERPL-SCNC: 105 MMOL/L (ref 98–107)
CO2 SERPL-SCNC: 28 MMOL/L (ref 21–32)
CREAT SERPL-MCNC: 0.97 MG/DL (ref 0.6–1)
DIAGNOSIS, 93000: NORMAL
DIFFERENTIAL METHOD BLD: ABNORMAL
EOSINOPHIL # BLD: 0.1 K/UL (ref 0–0.8)
EOSINOPHIL NFR BLD: 1 % (ref 0.5–7.8)
ERYTHROCYTE [DISTWIDTH] IN BLOOD BY AUTOMATED COUNT: 13.7 % (ref 11.9–14.6)
GLOBULIN SER CALC-MCNC: 4 G/DL (ref 2.3–3.5)
GLUCOSE SERPL-MCNC: 116 MG/DL (ref 65–100)
HCT VFR BLD AUTO: 41.9 % (ref 35.8–46.3)
HGB BLD-MCNC: 14 G/DL (ref 11.7–15.4)
IMM GRANULOCYTES # BLD AUTO: 0 K/UL (ref 0–0.5)
IMM GRANULOCYTES NFR BLD AUTO: 0 % (ref 0–5)
LIPASE SERPL-CCNC: 149 U/L (ref 73–393)
LYMPHOCYTES # BLD: 0.6 K/UL (ref 0.5–4.6)
LYMPHOCYTES NFR BLD: 6 % (ref 13–44)
MCH RBC QN AUTO: 28 PG (ref 26.1–32.9)
MCHC RBC AUTO-ENTMCNC: 33.4 G/DL (ref 31.4–35)
MCV RBC AUTO: 83.8 FL (ref 79.6–97.8)
MONOCYTES # BLD: 0.4 K/UL (ref 0.1–1.3)
MONOCYTES NFR BLD: 4 % (ref 4–12)
NEUTS SEG # BLD: 8.4 K/UL (ref 1.7–8.2)
NEUTS SEG NFR BLD: 89 % (ref 43–78)
NRBC # BLD: 0 K/UL (ref 0–0.2)
P-R INTERVAL, ECG05: 152 MS
PLATELET # BLD AUTO: 237 K/UL (ref 150–450)
PMV BLD AUTO: 9 FL (ref 9.4–12.3)
POTASSIUM SERPL-SCNC: 3.2 MMOL/L (ref 3.5–5.1)
PROT SERPL-MCNC: 8 G/DL (ref 6.3–8.2)
Q-T INTERVAL, ECG07: 356 MS
QRS DURATION, ECG06: 76 MS
QTC CALCULATION (BEZET), ECG08: 456 MS
RBC # BLD AUTO: 5 M/UL (ref 4.05–5.2)
SODIUM SERPL-SCNC: 141 MMOL/L (ref 136–145)
TROPONIN I BLD-MCNC: 0 NG/ML (ref 0.02–0.05)
TROPONIN I BLD-MCNC: 0 NG/ML (ref 0.02–0.05)
TROPONIN I BLD-MCNC: 0.01 NG/ML (ref 0.02–0.05)
VENTRICULAR RATE, ECG03: 99 BPM
WBC # BLD AUTO: 9.4 K/UL (ref 4.3–11.1)

## 2019-03-01 PROCEDURE — 85025 COMPLETE CBC W/AUTO DIFF WBC: CPT

## 2019-03-01 PROCEDURE — 74011250636 HC RX REV CODE- 250/636

## 2019-03-01 PROCEDURE — 74011636320 HC RX REV CODE- 636/320: Performed by: EMERGENCY MEDICINE

## 2019-03-01 PROCEDURE — 83690 ASSAY OF LIPASE: CPT

## 2019-03-01 PROCEDURE — 96374 THER/PROPH/DIAG INJ IV PUSH: CPT | Performed by: EMERGENCY MEDICINE

## 2019-03-01 PROCEDURE — 96376 TX/PRO/DX INJ SAME DRUG ADON: CPT | Performed by: EMERGENCY MEDICINE

## 2019-03-01 PROCEDURE — 84484 ASSAY OF TROPONIN QUANT: CPT

## 2019-03-01 PROCEDURE — 74011000258 HC RX REV CODE- 258: Performed by: EMERGENCY MEDICINE

## 2019-03-01 PROCEDURE — 93005 ELECTROCARDIOGRAM TRACING: CPT | Performed by: EMERGENCY MEDICINE

## 2019-03-01 PROCEDURE — 99285 EMERGENCY DEPT VISIT HI MDM: CPT | Performed by: EMERGENCY MEDICINE

## 2019-03-01 PROCEDURE — 71045 X-RAY EXAM CHEST 1 VIEW: CPT

## 2019-03-01 PROCEDURE — 74011250636 HC RX REV CODE- 250/636: Performed by: EMERGENCY MEDICINE

## 2019-03-01 PROCEDURE — 80053 COMPREHEN METABOLIC PANEL: CPT

## 2019-03-01 PROCEDURE — 74011000250 HC RX REV CODE- 250: Performed by: EMERGENCY MEDICINE

## 2019-03-01 PROCEDURE — 96375 TX/PRO/DX INJ NEW DRUG ADDON: CPT | Performed by: EMERGENCY MEDICINE

## 2019-03-01 PROCEDURE — 71275 CT ANGIOGRAPHY CHEST: CPT

## 2019-03-01 RX ORDER — ONDANSETRON 2 MG/ML
INJECTION INTRAMUSCULAR; INTRAVENOUS
Status: COMPLETED
Start: 2019-03-01 | End: 2019-03-01

## 2019-03-01 RX ORDER — LABETALOL HCL 20 MG/4 ML
SYRINGE (ML) INTRAVENOUS
Status: DISCONTINUED
Start: 2019-03-01 | End: 2019-03-01 | Stop reason: HOSPADM

## 2019-03-01 RX ORDER — CYCLOBENZAPRINE HCL 10 MG
10 TABLET ORAL
Qty: 12 TAB | Refills: 0 | Status: ON HOLD | OUTPATIENT
Start: 2019-03-01 | End: 2019-05-19

## 2019-03-01 RX ORDER — LORAZEPAM 2 MG/ML
1 INJECTION INTRAMUSCULAR
Status: COMPLETED | OUTPATIENT
Start: 2019-03-01 | End: 2019-03-01

## 2019-03-01 RX ORDER — HYDROMORPHONE HYDROCHLORIDE 1 MG/ML
1 INJECTION, SOLUTION INTRAMUSCULAR; INTRAVENOUS; SUBCUTANEOUS
Status: COMPLETED | OUTPATIENT
Start: 2019-03-01 | End: 2019-03-01

## 2019-03-01 RX ORDER — MORPHINE SULFATE 15 MG/1
7.5 TABLET ORAL
Qty: 6 TAB | Refills: 0 | Status: SHIPPED | OUTPATIENT
Start: 2019-03-01 | End: 2019-03-03

## 2019-03-01 RX ORDER — LABETALOL HYDROCHLORIDE 5 MG/ML
20 INJECTION, SOLUTION INTRAVENOUS
Status: COMPLETED | OUTPATIENT
Start: 2019-03-01 | End: 2019-03-01

## 2019-03-01 RX ORDER — SODIUM CHLORIDE 0.9 % (FLUSH) 0.9 %
10 SYRINGE (ML) INJECTION
Status: COMPLETED | OUTPATIENT
Start: 2019-03-01 | End: 2019-03-01

## 2019-03-01 RX ORDER — ONDANSETRON 2 MG/ML
8 INJECTION INTRAMUSCULAR; INTRAVENOUS
Status: COMPLETED | OUTPATIENT
Start: 2019-03-01 | End: 2019-03-01

## 2019-03-01 RX ORDER — ONDANSETRON 2 MG/ML
4 INJECTION INTRAMUSCULAR; INTRAVENOUS
Status: COMPLETED | OUTPATIENT
Start: 2019-03-01 | End: 2019-03-01

## 2019-03-01 RX ORDER — ONDANSETRON 8 MG/1
8 TABLET, ORALLY DISINTEGRATING ORAL
Qty: 12 TAB | Refills: 0 | Status: SHIPPED | OUTPATIENT
Start: 2019-03-01 | End: 2019-12-19 | Stop reason: CLARIF

## 2019-03-01 RX ADMIN — IOPAMIDOL 100 ML: 755 INJECTION, SOLUTION INTRAVENOUS at 16:04

## 2019-03-01 RX ADMIN — SODIUM CHLORIDE 100 ML: 900 INJECTION, SOLUTION INTRAVENOUS at 16:04

## 2019-03-01 RX ADMIN — LABETALOL HYDROCHLORIDE 20 MG: 5 INJECTION INTRAVENOUS at 17:08

## 2019-03-01 RX ADMIN — ONDANSETRON 4 MG: 2 INJECTION INTRAMUSCULAR; INTRAVENOUS at 15:51

## 2019-03-01 RX ADMIN — HYDROMORPHONE HYDROCHLORIDE 1 MG: 1 INJECTION, SOLUTION INTRAMUSCULAR; INTRAVENOUS; SUBCUTANEOUS at 15:51

## 2019-03-01 RX ADMIN — ONDANSETRON 8 MG: 2 INJECTION INTRAMUSCULAR; INTRAVENOUS at 19:19

## 2019-03-01 RX ADMIN — Medication 10 ML: at 16:04

## 2019-03-01 RX ADMIN — LORAZEPAM 1 MG: 2 INJECTION INTRAMUSCULAR; INTRAVENOUS at 17:14

## 2019-03-01 NOTE — LETTER
[facility] [facility] EMERGENCY DEPT [address] [address] 
[phone] Work/School Note Date: 3/1/2019 To Whom It May concern: 
 
Tamiko Mosquera was seen and treated today in the emergency room by the following provider(s): 
Attending Provider: Fatoumata Wilson MD.   
 
Tamiko Mosquera may return to work on Sunday March 3rd, 2019. Sincerely, Roddy Hanks RN

## 2019-03-01 NOTE — ED NOTES
Patient report received from Leticia, [facility]. Patient care to be assumed at this time.

## 2019-03-01 NOTE — ED NOTES
Patient has returned from CT at this time time. Cardiac monitoring and cycling vitals in place. Will continue to monitor.

## 2019-03-01 NOTE — ED PROVIDER NOTES
The history is provided by the patient. Chest Pain (Angina) This is a new problem. Episode onset: 10 am. The problem has not changed since onset. Duration of episode(s) is 5 hours. The problem occurs constantly. The pain is associated with normal activity. The pain is severe. The quality of the pain is described as sharp and stabbing. The pain radiates to the upper back, mid back and lower back (abdomen). Exacerbated by: nothing changes it. Associated symptoms include abdominal pain, back pain, diaphoresis, leg pain, nausea and weakness. Pertinent negatives include no cough, no fever, no hemoptysis, no numbness, no shortness of breath and no sputum production. She has tried nothing for the symptoms. Risk factors include cardiac disease (chronic back pain). Her past medical history does not include DVT or PE. Procedural history includes cardiac catheterization and cardiac stents. Pertinent negatives include no CABG. Past Medical History:  
Diagnosis Date  CAD (coronary artery disease)  Psychiatric disorder Past Surgical History:  
Procedure Laterality Date [address] [place] [address] UNLISTED  CARDIAC SURG PROCEDURE UNLIST    
 stent 2016  HX GI    
 cholesectomy  HX GYN    
 hysterectomy  HX HEENT    
 NEUROLOGICAL PROCEDURE UNLISTED    
 neck surgery History reviewed. No pertinent family history. Social History Socioeconomic History  Marital status: LEGALLY  Spouse name: Not on file  Number of children: Not on file  Years of education: Not on file  Highest education level: Not on file Social Needs  Financial resource strain: Not on file  Food insecurity - worry: Not on file  Food insecurity - inability: Not on file  Transportation needs - medical: Not on file  Transportation needs - non-medical: Not on file Occupational History  Not on file Tobacco Use  Smoking status: Never Smoker  Smokeless tobacco: Never Used Substance and Sexual Activity  Alcohol use: No  
 Drug use: No  
 Sexual activity: Not on file Other Topics Concern  Not on file Social History Narrative  Not on file ALLERGIES: Lortab [hydrocodone-acetaminophen] and Pcn [penicillins] Review of Systems Constitutional: Positive for diaphoresis. Negative for fever. HENT: Positive for rhinorrhea. Negative for congestion and sore throat. Respiratory: Negative for cough, hemoptysis, sputum production and shortness of breath. Cardiovascular: Positive for chest pain. Gastrointestinal: Positive for abdominal pain and nausea. Endocrine: Negative for polydipsia and polyuria. Genitourinary: Negative for dysuria, frequency, hematuria and urgency. Denies loss of function of bladder or bowels Musculoskeletal: Positive for back pain and neck pain. Skin: Negative for color change. Neurological: Positive for weakness. Negative for numbness. Vitals:  
 03/01/19 1438 BP: 126/85 Pulse: 99 Resp: 16 Temp: 98.6 °F (37 °C) SpO2: 100% Weight: 81.6 kg (180 lb) Height: 5' 4\" (1.626 m) Physical Exam  
Constitutional: She appears well-developed and well-nourished. She does not appear ill. No distress. HENT:  
Head: Normocephalic and atraumatic. Eyes: EOM are normal. Pupils are equal, round, and reactive to light. Neck: Normal range of motion. Neck supple. Cardiovascular: Tachycardia present. No murmur heard. Pulses: 
     Carotid pulses are 2+ on the right side, and 1+ on the left side. Radial pulses are 2+ on the right side, and 1+ on the left side. Dorsalis pedis pulses are 1+ on the right side, and 1+ on the left side. Posterior tibial pulses are 1+ on the right side, and 1+ on the left side. Pulmonary/Chest: Effort normal and breath sounds normal.  
Abdominal: Soft. She exhibits no mass.  There is tenderness (diffuse tenderness perhaps worse in epigastric area). Musculoskeletal: She exhibits tenderness (diffusemidline and paraspinous back painwith palpation). Motor and sensation intact upper and lower extremities. 4 out of 5 strength lower extremities. Seems to be due to discomfort with movement Neurological: She is alert. No sensory deficit. GCS eye subscore is 4. GCS verbal subscore is 5. GCS motor subscore is 6. Reflex Scores: 
     Patellar reflexes are 2+ on the right side and 2+ on the left side. 4 out of 5 strength bilaterally lower extremities Skin: Skin is warm and dry. Nursing note and vitals reviewed. MDM Number of Diagnoses or Management Options Diagnosis management comments: CTA to exclude aortic dissection. Severe sharp chest pain without aggravating or alleviating factors with radiation to upper back lower back and abdomen. On physical exam template there was no area for documentation of femoral pulses but these were 2+ on the right and 1+ on the left. She was tender in this area as well. rule out MI. Patient denies pleuritic component. 6:28 PM 
Troponin ×2 negative. CTA negative. Patient complains of her chronic back pain at this time. She does have medication for anxiety at home. We'll prescribe her a muscle relaxer as well as something stronger for pain over the weekend until she can see her PCP early this week. I have left her information with [facility] cardiology's after hours line for them to call her on Monday and be seen on Tuesday for follow-up due to chest pain with history of coronary artery disease. Amount and/or Complexity of Data Reviewed Clinical lab tests: ordered and reviewed (Results for orders placed or performed during the hospital encounter of 03/01/19 
-CBC WITH AUTOMATED DIFF Result                      Value             Ref Range      WBC                         9.4               4.3 - 11.1 K* 
 RBC                         5.00              4.05 - 5.2 M* HGB                         14.0              11.7 - 15.4 * HCT                         41.9              35.8 - 46.3 % MCV                         83.8              79.6 - 97.8 * MCH                         28.0              26.1 - 32.9 * MCHC                        33.4              31.4 - 35.0 * RDW                         13.7              11.9 - 14.6 % PLATELET                    237               150 - 450 K/* MPV                         9.0 (L)           9.4 - 12.3 FL ABSOLUTE NRBC               0.00              0.0 - 0.2 K/* DF                          AUTOMATED NEUTROPHILS                 89 (H)            43 - 78 % LYMPHOCYTES                 6 (L)             13 - 44 % MONOCYTES                   4                 4.0 - 12.0 % EOSINOPHILS                 1                 0.5 - 7.8 % BASOPHILS                   0                 0.0 - 2.0 % IMMATURE GRANULOCYTES       0                 0.0 - 5.0 %   
     ABS. NEUTROPHILS            8.4 (H)           1.7 - 8.2 K/* ABS. LYMPHOCYTES            0.6               0.5 - 4.6 K/* ABS. MONOCYTES              0.4               0.1 - 1.3 K/* ABS. EOSINOPHILS            0.1               0.0 - 0.8 K/* ABS. BASOPHILS              0.0               0.0 - 0.2 K/* ABS. IMM. GRANS.            0.0               0.0 - 0.5 K/* 
-METABOLIC PANEL, COMPREHENSIVE Result                      Value             Ref Range Sodium                      141               136 - 145 mm* Potassium                   3.2 (L)           3.5 - 5.1 mm* Chloride                    105               98 - 107 mmo* CO2                         28                21 - 32 mmol*      Anion gap                   8                 7 - 16 mmol/L 
 Glucose                     116 (H)           65 - 100 mg/* BUN                         17                6 - 23 MG/DL Creatinine                  0.97              0.6 - 1.0 MG* 
     GFR est AA                  >60               >60 ml/min/1* GFR est non-AA              >60               >60 ml/min/1* Calcium                     9.8               8.3 - 10.4 M* Bilirubin, total            0.5               0.2 - 1.1 MG* ALT (SGPT)                  18                12 - 65 U/L   
     AST (SGOT)                  13 (L)            15 - 37 U/L Alk. phosphatase            91                50 - 136 U/L Protein, total              8.0               6.3 - 8.2 g/* Albumin                     4.0               3.5 - 5.0 g/* Globulin                    4.0 (H)           2.3 - 3.5 g/* A-G Ratio                   1.0 (L)           1.2 - 3.5     
-LIPASE Result                      Value             Ref Range Lipase                      149               73 - 393 U/L  
-POC TROPONIN-I Result                      Value             Ref Range Troponin-I (POC)            0 (L)             0.02 - 0.05 * 
-POC TROPONIN-I Result                      Value             Ref Range Troponin-I (POC)            0 (L)             0.02 - 0.05 * 
-EKG, 12 LEAD, INITIAL Result                      Value             Ref Range Ventricular Rate            99                BPM           
     Atrial Rate                 99                BPM           
     P-R Interval                152               ms            
     QRS Duration                76                ms Q-T Interval                356               ms            
     QTC Calculation (Bezet)     456               ms            
     Calculated P Axis           56                degrees Calculated R Axis           48                degrees Calculated T Axis           -5                degrees Diagnosis Normal sinus rhythm Poor R wave progression Abnormal ECG No previous ECGs available Confirmed by Loren Grady ([id number]) on 3/1/2019 3:44:37 PM 
  
) Tests in the radiology section of CPT®: ordered and reviewed (Cta Chest Abd Pelv W Wo Cont Result Date: 3/1/2019 Title:  CT arteriogram of the chest, abdomen and pelvis. Indication:  Severe chest pain. Technique: Axial images of the abdomen and pelvis were obtained before and after the administration of intravenous iodinated contrast media. Contrast was used to best identify the arterial structures. Images were reviewed on a separate, free standing, three-dimensional workstation as per the referring physicians request.  All CT scans at this facility are performed using dose reduction/dose modulation techniques, as appropriate the performed exam, including the following: Automated Exposure Control; Adjustment of the mA and/or kV according to patient size (this includes techniques or standardized protocols for targeted exams where dose is matched to indication/reason for exam); and Use of Iterative Reconstruction Technique. Comparison: None Findings: Neck base: Normal Lungs: Normal Mediastinum: Normal. Cardiac: Coronary stent. Esophagus: Small hiatal hernia. There is some fluid within the esophagus. CHEST WALL: Normal Liver:Normal Biliary:Previous cholecystectomy. Pancreas: Normal Spleen:Normal Adrenals:Normal Kidneys:Normal Lymph nodes:No pathologically enlarged lymph nodes Abdominal wall:[place] Bowel:Mildly prominent fluid-filled small and large bowel. This is a nonspecific finding but can be seen in enteritis. Some scattered mild colonic diverticulosis.  Pelvic organs:Previous hysterectomy Bones: Mild degenerative changes primarily in the spine Vascular: Aorta:No evidence of aneurysm or dissection. Mild atherosclerotic changes are present primarily in the abdominal aorta. The mesenteric vessels are patent. Patent great vessels. Impression:  1. No evidence of aortic aneurysm or dissection. Xr Chest [facility] Result Date: 3/1/2019 Portable Chest  X-ray  Indication: Acute chest pain and upper back pain Comparison:  None Findings:  Portable AP view demonstrates cardiac shadow and aortic shadow appear normal. Lungs and pleural spaces are clear. Negative pneumothorax. Fusion hardware in the cervical spine is partially imaged. Impression:  No acute cardiopulmonary findings. ) Review and summarize past medical records: yes (ANGIOGRAPHIC RESULTS 1. Left main coronary artery: A large caliber vessel, appeared  
angiographically normal.   
2. LAD: A small caliber vessel. Contains a stent in the proximal  
segment. There is a 40% to 50% stenosis just in front of the  
stented segment extending to the LAD ostium. The remainder of the  
vessel is very small caliber and approximately 2 to 2.25 mm. 3. First diagonal artery: A small caliber vessel. Contains a 30%  
ostial stenosis. 4. Left circumflex: A medium caliber vessel. 20% distal stenosis  
prior to termination to an obtuse marginal which is patent. 5. Right coronary artery: Large caliber, dominant vessel. 20% to  
30% proximal and 30% mid stenosis. 6. Right PDA: Small caliber vessel. Patent. 7. Right posterolateral branch: Medium caliber vessel. patent. 8. Left ventriculogram performed in a FAN projection shows a  
normal left ventricular systolic function, EF 34% to 65%. No  
focal segmental wall motion abnormalities. The aortic root is  
nondilated. 9. FFR of LAD is 0.88, indicating hemodynamically insignificant  
stenosis. CONCLUSIONS 1. Patent left anterior descending stent with moderate nonobstructive disease in other myocardial segments. Borderline  
ostial left anterior descending stenosis which was showed normal  
hemodynamics with fractional flow reserve measurement of 0.88. Also it should be noted that patient recently had a stress test  
which showed no evidence of anterior ischemia. 2. Normal left ventricular systolic function. 3. Successful deployment of 6-[place] AngioSeal vascular device with  
good hemostasis.  
  
PLAN: Followup with Dr. Stephanie Nathan in 2 weeks.  
  
  
  
Chan Reaves MD  
 
) Procedures

## 2019-03-01 NOTE — ED NOTES
Patient placed on 3L NC due to O2 saturation dropping into 80%. Patient easily arousable.   Patient's O2 sats greater than 95% on 3l NC.

## 2019-03-01 NOTE — DISCHARGE INSTRUCTIONS
Patient Education      continue daily aspirin and other medications. Tylenol 500-1000 mg every 6 hours for pain. Ibuprofen 400 mg every 6 hours for pain. Morphine one half to one tablet every 4-6 hours if needed for severe breakthrough pain for 1-2 days only. Call your primary care doctor Monday for follow-up appointment and blood pressure recheck early this week. Call your cardiologist Monday afternoon if you have not heard from them Monday morning regarding a follow-up appointment as well. Return to the emergency department over the weekend should she have any new, worsening or concerning symptoms. Learning About Relief for Back Pain  What is back tension and strain? Back strain happens when you overstretch, or pull, a muscle in your back. You may hurt your back in an accident or when you exercise or lift something. Most back pain will get better with rest and time. You can take care of yourself at home to help your back heal.  What can you do first to relieve back pain? When you first feel back pain, try these steps:  · Walk. Take a short walk (10 to 20 minutes) on a level surface (no slopes, hills, or stairs) every 2 to 3 hours. Walk only distances you can manage without pain, especially leg pain. · Relax. Find a comfortable position for rest. Some people are comfortable on the floor or a medium-firm bed with a small pillow under their head and another under their knees. Some people prefer to lie on their side with a pillow between their knees. Don't stay in one position for too long. · Try heat or ice. Try using a heating pad on a low or medium setting, or take a warm shower, for 15 to 20 minutes every 2 to 3 hours. Or you can buy single-use heat wraps that last up to 8 hours. You can also try an ice pack for 10 to 15 minutes every 2 to 3 hours. You can use an ice pack or a bag of frozen vegetables wrapped in a thin towel.  There is not strong evidence that either heat or ice will help, but you can try them to see if they help. You may also want to try switching between heat and cold. · Take pain medicine exactly as directed. ¨ If the doctor gave you a prescription medicine for pain, take it as prescribed. ¨ If you are not taking a prescription pain medicine, ask your doctor if you can take an over-the-counter medicine. What else can you do? · Stretch and exercise. Exercises that increase flexibility may relieve your pain and make it easier for your muscles to keep your spine in a good, neutral position. And don't forget to keep walking. · Do self-massage. You can use self-massage to unwind after work or school or to energize yourself in the morning. You can easily massage your feet, hands, or neck. Self-massage works best if you are in comfortable clothes and are sitting or lying in a comfortable position. Use oil or lotion to massage bare skin. · Reduce stress. Back pain can lead to a vicious [place]: Distress about the pain tenses the muscles in your back, which in turn causes more pain. Learn how to relax your mind and your muscles to lower your stress. Where can you learn more? Go to http://doreenPeckfort[employer] Pharmaceuticalsharoldo.info/. Enter Y993 in the search box to learn more about \"Learning About Relief for Back Pain. \"  Current as of: March 21, 2017  Content Version: 11.5  © 5975-6830 [employer]. Care instructions adapted under license by [employer] (which disclaims liability or warranty for this information). If you have questions about a medical condition or this instruction, always ask your healthcare professional. [place] [address] any warranty or liability for your use of this information. Patient Education        Abdominal Pain: Care Instructions  Your Care Instructions    Abdominal pain has many possible causes. Some aren't serious and get better on their own in a few days. Others need more testing and treatment.  If your pain continues or gets worse, you need to be rechecked and may need more tests to find out what is wrong. You may need surgery to correct the problem. Don't ignore new symptoms, such as fever, nausea and vomiting, urination problems, pain that gets worse, and dizziness. These may be signs of a more serious problem. Your doctor may have recommended a follow-up visit in the next 8 to 12 hours. If you are not getting better, you may need more tests or treatment. The doctor has checked you carefully, but problems can develop later. If you notice any problems or new symptoms, get medical treatment right away. Follow-up care is a key part of your treatment and safety. Be sure to make and go to all appointments, and call your doctor if you are having problems. It's also a good idea to know your test results and keep a list of the medicines you take. How can you care for yourself at home? · Rest until you feel better. · To prevent dehydration, drink plenty of fluids, enough so that your urine is light yellow or clear like water. Choose water and other caffeine-free clear liquids until you feel better. If you have kidney, heart, or liver disease and have to limit fluids, talk with your doctor before you increase the amount of fluids you drink. · If your stomach is upset, eat mild foods, such as rice, dry toast or crackers, bananas, and applesauce. Try eating several small meals instead of two or three large ones. · Wait until 48 hours after all symptoms have gone away before you have spicy foods, alcohol, and drinks that contain caffeine. · Do not eat foods that are high in fat. · Avoid anti-inflammatory medicines such as aspirin, ibuprofen (Advil, Motrin), and naproxen (Aleve). These can cause stomach upset. Talk to your doctor if you take daily aspirin for another health problem. When should you call for help? Call 911 anytime you think you may need emergency care. For example, call if:    · You passed out (lost consciousness).      · You pass maroon or very bloody stools.     · You vomit blood or what looks like coffee grounds.     · You have new, severe belly pain.    Call your doctor now or seek immediate medical care if:    · Your pain gets worse, especially if it becomes focused in one area of your belly.     · You have a new or higher fever.     · Your stools are black and look like tar, or they have streaks of blood.     · You have unexpected vaginal bleeding.     · You have symptoms of a urinary tract infection. These may include:  ? Pain when you urinate. ? Urinating more often than usual.  ? Blood in your urine.     · You are dizzy or lightheaded, or you feel like you may faint.    Watch closely for changes in your health, and be sure to contact your doctor if:    · You are not getting better after 1 day (24 hours). Where can you learn more? Go to http://doreen-haroldo.info/. Enter A296 in the search box to learn more about \"Abdominal Pain: Care Instructions. \"  Current as of: September 23, 2018  Content Version: 11.9  © 5419-8858 [employer]. Care instructions adapted under license by [employer] (which disclaims liability or warranty for this information). If you have questions about a medical condition or this instruction, always ask your healthcare professional. [address] any warranty or liability for your use of this information. Patient Education        Chest Pain: Care Instructions  Your Care Instructions    There are many things that can cause chest pain. Some are not serious and will get better on their own in a few days. But some kinds of chest pain need more testing and treatment. Your doctor may have recommended a follow-up visit in the next 8 to 12 hours. If you are not getting better, you may need more tests or treatment. Even though your doctor has released you, you still need to watch for any problems.  The doctor carefully checked you, but sometimes problems can develop later. If you have new symptoms or if your symptoms do not get better, get medical care right away. If you have worse or different chest pain or pressure that lasts more than 5 minutes or you passed out (lost consciousness), call 911 or seek other emergency help right away. A medical visit is only one step in your treatment. Even if you feel better, you still need to do what your doctor recommends, such as going to all suggested follow-up appointments and taking medicines exactly as directed. This will help you recover and help prevent future problems. How can you care for yourself at home? · Rest until you feel better. · Take your medicine exactly as prescribed. Call your doctor if you think you are having a problem with your medicine. · Do not drive after taking a prescription pain medicine. When should you call for help? Call 911 if:    · You passed out (lost consciousness).     · You have severe difficulty breathing.     · You have symptoms of a heart attack. These may include:  ? Chest pain or pressure, or a strange feeling in your chest.  ? Sweating. ? Shortness of breath. ? Nausea or vomiting. ? Pain, pressure, or a strange feeling in your back, neck, jaw, or upper belly or in one or both shoulders or arms. ? Lightheadedness or sudden weakness. ? A fast or irregular heartbeat. After you call 911, the  may tell you to chew 1 adult-strength or 2 to 4 low-dose aspirin. Wait for an ambulance. Do not try to drive yourself.    Call your doctor today if:    · You have any trouble breathing.     · Your chest pain gets worse.     · You are dizzy or lightheaded, or you feel like you may faint.     · You are not getting better as expected.     · You are having new or different chest pain. Where can you learn more? Go to http://doreen-haroldo.info/. Enter A120 in the search box to learn more about \"Chest Pain: Care Instructions. \"  Current as of: September 23, 2018  Content Version: 11.9  © 7488-7692 [employer]. Care instructions adapted under license by [employer] (which disclaims liability or warranty for this information). If you have questions about a medical condition or this instruction, always ask your healthcare professional. [address] any warranty or liability for your use of this information.

## 2019-03-01 NOTE — ED TRIAGE NOTES
Pt ambulatory to triage without complications. Pt reports cp, abd pain, and back pain with nausea that began this morning. Pt states she has 1 stent placed in 2015 in LAD. Pt describes cp as being sharp and radiating to her back and rates 9/10. Pt states she had one episode of diaphoresis with the pain this morning. Pt reports significant spinal hx as well. Pt states the pain radiating to her back feels a bit different and pt states her chronic back pain is significantly worse today

## 2019-03-01 NOTE — LETTER
[facility] [place] [facility] [place]PT [facility] [address] [address] 
[phone] Work/School Note Date: 3/1/2019 To Whom It May concern: 
 
Flaquita Dunbar was seen and treated today in the emergency room by the following provider(s): 
Attending Provider: Lilli Dillon MD.   
 
Flaquita Dunbar may return to work on Saturday March 2nd 2019. Sincerely, Danna Watkins RN

## 2019-03-02 NOTE — ED NOTES
Patient to and from restroom, patient arrives back in room and vomits onto stretcher. Dr. Fidencio Dimas at bedside. Patient has been medicated with 8mg zofran IV, linens changed. poc troponin has been collected and is running at this time. Patient resting in stretcher with cardiac monitoring, cycling vitals in place. Will continue to monitor.

## 2019-03-02 NOTE — ED NOTES
Patient has been wheeled to [employer] via nurse and is tolerating po challenge well at this time. Patient awaiting brother to transport her home. Awaiting ride in [employer], appears in no acute distress. Patient has discharge instructions, prescriptions x3, and follow up information in hand.

## 2019-03-02 NOTE — ED NOTES
I have reviewed discharge instructions with the patient. The patient verbalized understanding. Patient to follow up with PMD as referred and RTED with any changes/concerns. Patient expresses understanding. Patient from ED via wheelchair in NAD with Rx x 2. Patient advised that they received medications (either in ED or by Rx) which could cause them to be somnolent. Patient advised that they shouldn't drive or operate machinery and should use caution to avoid falls while under the effects (8-12 hours after last dosage) of said medicine. Patient affirms she will not drive this evening or while using her narcotic Rx(s). Patient to lobby via wheelchair to await her ride who will drive her home. Work excuse provided.

## 2019-05-19 ENCOUNTER — HOSPITAL ENCOUNTER (INPATIENT)
Age: 59
LOS: 2 days | Discharge: HOME | DRG: 392 | End: 2019-05-21
Attending: INTERNAL MEDICINE | Admitting: INTERNAL MEDICINE
Payer: COMMERCIAL

## 2019-05-19 PROBLEM — I20.0 UNSTABLE ANGINA (HCC): Status: ACTIVE | Noted: 2019-05-19

## 2019-05-19 LAB — TROPONIN I SERPL-MCNC: <0.02 NG/ML (ref 0.02–0.05)

## 2019-05-19 PROCEDURE — 36415 COLL VENOUS BLD VENIPUNCTURE: CPT

## 2019-05-19 PROCEDURE — 74011250636 HC RX REV CODE- 250/636: Performed by: NURSE PRACTITIONER

## 2019-05-19 PROCEDURE — 84484 ASSAY OF TROPONIN QUANT: CPT

## 2019-05-19 PROCEDURE — 77030020263 HC SOL INJ SOD CL0.9% LFCR 1000ML

## 2019-05-19 PROCEDURE — 93005 ELECTROCARDIOGRAM TRACING: CPT | Performed by: INTERNAL MEDICINE

## 2019-05-19 PROCEDURE — 74011250637 HC RX REV CODE- 250/637: Performed by: NURSE PRACTITIONER

## 2019-05-19 PROCEDURE — 65660000000 HC RM CCU STEPDOWN

## 2019-05-19 RX ORDER — DULOXETIN HYDROCHLORIDE 30 MG/1
30 CAPSULE, DELAYED RELEASE ORAL DAILY
Status: DISCONTINUED | OUTPATIENT
Start: 2019-05-20 | End: 2019-05-19

## 2019-05-19 RX ORDER — HEPARIN SODIUM 5000 [USP'U]/100ML
12-25 INJECTION, SOLUTION INTRAVENOUS
Status: DISCONTINUED | OUTPATIENT
Start: 2019-05-19 | End: 2019-05-20

## 2019-05-19 RX ORDER — DULOXETIN HYDROCHLORIDE 60 MG/1
60 CAPSULE, DELAYED RELEASE ORAL
COMMUNITY

## 2019-05-19 RX ORDER — POTASSIUM CHLORIDE 20 MEQ/1
40 TABLET, EXTENDED RELEASE ORAL
Status: COMPLETED | OUTPATIENT
Start: 2019-05-19 | End: 2019-05-19

## 2019-05-19 RX ORDER — CYCLOBENZAPRINE HCL 10 MG
10 TABLET ORAL
Status: DISCONTINUED | OUTPATIENT
Start: 2019-05-19 | End: 2019-05-21 | Stop reason: HOSPADM

## 2019-05-19 RX ORDER — SODIUM CHLORIDE 9 MG/ML
100 INJECTION, SOLUTION INTRAVENOUS CONTINUOUS
Status: DISCONTINUED | OUTPATIENT
Start: 2019-05-19 | End: 2019-05-21 | Stop reason: HOSPADM

## 2019-05-19 RX ORDER — DULOXETIN HYDROCHLORIDE 60 MG/1
60 CAPSULE, DELAYED RELEASE ORAL
Status: DISCONTINUED | OUTPATIENT
Start: 2019-05-19 | End: 2019-05-21 | Stop reason: HOSPADM

## 2019-05-19 RX ORDER — METOPROLOL TARTRATE 25 MG/1
25 TABLET, FILM COATED ORAL EVERY 12 HOURS
Status: DISCONTINUED | OUTPATIENT
Start: 2019-05-19 | End: 2019-05-20

## 2019-05-19 RX ORDER — ESCITALOPRAM OXALATE 10 MG/1
10 TABLET ORAL DAILY
Status: DISCONTINUED | OUTPATIENT
Start: 2019-05-20 | End: 2019-05-19

## 2019-05-19 RX ORDER — NITROGLYCERIN 0.4 MG/1
0.4 TABLET SUBLINGUAL
Status: DISCONTINUED | OUTPATIENT
Start: 2019-05-19 | End: 2019-05-19

## 2019-05-19 RX ORDER — PANTOPRAZOLE SODIUM 40 MG/1
40 TABLET, DELAYED RELEASE ORAL DAILY
Status: DISCONTINUED | OUTPATIENT
Start: 2019-05-20 | End: 2019-05-21 | Stop reason: HOSPADM

## 2019-05-19 RX ORDER — LISINOPRIL 5 MG/1
2.5 TABLET ORAL DAILY
Status: DISCONTINUED | OUTPATIENT
Start: 2019-05-20 | End: 2019-05-21 | Stop reason: HOSPADM

## 2019-05-19 RX ORDER — TRAZODONE HYDROCHLORIDE 50 MG/1
100 TABLET ORAL
Status: DISCONTINUED | OUTPATIENT
Start: 2019-05-19 | End: 2019-05-21 | Stop reason: HOSPADM

## 2019-05-19 RX ORDER — SODIUM CHLORIDE 0.9 % (FLUSH) 0.9 %
5-40 SYRINGE (ML) INJECTION AS NEEDED
Status: DISCONTINUED | OUTPATIENT
Start: 2019-05-19 | End: 2019-05-21 | Stop reason: HOSPADM

## 2019-05-19 RX ORDER — NITROGLYCERIN 0.4 MG/1
0.4 TABLET SUBLINGUAL
Status: DISCONTINUED | OUTPATIENT
Start: 2019-05-19 | End: 2019-05-21 | Stop reason: HOSPADM

## 2019-05-19 RX ORDER — TEMAZEPAM 15 MG/1
15 CAPSULE ORAL
Status: DISCONTINUED | OUTPATIENT
Start: 2019-05-19 | End: 2019-05-21 | Stop reason: HOSPADM

## 2019-05-19 RX ORDER — MORPHINE SULFATE 2 MG/ML
2 INJECTION, SOLUTION INTRAMUSCULAR; INTRAVENOUS
Status: DISCONTINUED | OUTPATIENT
Start: 2019-05-19 | End: 2019-05-21 | Stop reason: HOSPADM

## 2019-05-19 RX ORDER — DULOXETIN HYDROCHLORIDE 60 MG/1
60 CAPSULE, DELAYED RELEASE ORAL DAILY
Status: DISCONTINUED | OUTPATIENT
Start: 2019-05-20 | End: 2019-05-19

## 2019-05-19 RX ORDER — ASPIRIN 325 MG
325 TABLET ORAL DAILY
Status: DISCONTINUED | OUTPATIENT
Start: 2019-05-20 | End: 2019-05-20

## 2019-05-19 RX ORDER — ACETAMINOPHEN 325 MG/1
650 TABLET ORAL
Status: DISCONTINUED | OUTPATIENT
Start: 2019-05-19 | End: 2019-05-21 | Stop reason: HOSPADM

## 2019-05-19 RX ADMIN — DULOXETINE HYDROCHLORIDE 60 MG: 60 CAPSULE, DELAYED RELEASE ORAL at 22:07

## 2019-05-19 RX ADMIN — HEPARIN SODIUM 12 UNITS/KG/HR: 5000 INJECTION, SOLUTION INTRAVENOUS at 20:45

## 2019-05-19 RX ADMIN — TRAZODONE HYDROCHLORIDE 100 MG: 50 TABLET ORAL at 22:07

## 2019-05-19 RX ADMIN — POTASSIUM CHLORIDE 40 MEQ: 20 TABLET, EXTENDED RELEASE ORAL at 20:50

## 2019-05-19 RX ADMIN — SODIUM CHLORIDE 100 ML/HR: 900 INJECTION, SOLUTION INTRAVENOUS at 21:09

## 2019-05-19 RX ADMIN — ACETAMINOPHEN 650 MG: 325 TABLET, FILM COATED ORAL at 20:55

## 2019-05-19 RX ADMIN — METOPROLOL TARTRATE 25 MG: 25 TABLET, FILM COATED ORAL at 20:50

## 2019-05-19 RX ADMIN — NITROGLYCERIN 1 INCH: 20 OINTMENT TOPICAL at 20:50

## 2019-05-19 NOTE — H&P
[facility] Cardiology History & Physical  
  
Date of  Admission: 5/19/2019  6:50 PM  
 
Primary Care Physician:  Dr. Eris Dubose Primary Cardiologist: Dr. Georgina San Admitting Physician:  Dr. Margarette García CC:  Chest pain HPI:  Shar Apodaca is a 63 y.o. female with PMH of CAD ([address] [facility] 2017 -- patent stent LAD, ostial LAD--FFR 0.88), DSL, and HTN, who presented to the ED at [facility] with complaint of chest pain. The pain started Saturday and has been intermittent. The pain is left sided with radiation to the back and associated shortness of breath and nausea. The pain is described as a sharp pressure. The episodes lasted from 10 minutes to 30 minutes. Labs showed WBC 6.8, H7h 11.4/34.1, plt 251, Na 143, K 3.3, BUN 14, creatinine 0.84, trop 0.01 and BNP 16. CT of chest negative for acute findings. She was transferred to [facility] for further treatment. On arrival she complains of mild chest pressure. Heparin drip continued. Past Medical History:  
Diagnosis Date  CAD (coronary artery disease)  Psychiatric disorder Past Surgical History:  
Procedure Laterality Date [address] UNLISTED  CARDIAC SURG PROCEDURE UNLIST    
 stent 2016  HX GI    
 cholesectomy  HX GYN    
 hysterectomy  HX HEENT    
 NEUROLOGICAL PROCEDURE UNLISTED    
 neck surgery Allergies Allergen Reactions  Lortab [Hydrocodone-Acetaminophen] Rash  Pcn [Penicillins] Rash Social History Socioeconomic History  Marital status: LEGALLY  Spouse name: Not on file  Number of children: Not on file  Years of education: Not on file  Highest education level: Not on file Occupational History  Not on file Social Needs  Financial resource strain: Not on file  Food insecurity:  
  Worry: Not on file Inability: Not on file  Transportation needs:  
  Medical: Not on file Non-medical: Not on file Tobacco Use  Smoking status: Never Smoker  Smokeless tobacco: Never Used Substance and Sexual Activity  Alcohol use: No  
 Drug use: No  
 Sexual activity: Not on file Lifestyle  Physical activity:  
  Days per week: Not on file Minutes per session: Not on file  Stress: Not on file Relationships  Social connections:  
  Talks on phone: Not on file Gets together: Not on file Attends Hinduism service: Not on file Active member of club or organization: Not on file Attends meetings of clubs or organizations: Not on file Relationship status: Not on file  Intimate partner violence:  
  Fear of current or ex partner: Not on file Emotionally abused: Not on file Physically abused: Not on file Forced sexual activity: Not on file Other Topics Concern  Not on file Social History Narrative  Not on file No family history on file. No current facility-administered medications for this encounter. Review of Systems Review of Systems Constitution: Negative. HENT: Negative. Eyes: Negative. Cardiovascular: Positive for chest pain. Respiratory: Positive for shortness of breath. Skin: Negative. Musculoskeletal: Negative. Gastrointestinal: Positive for nausea. Genitourinary: Negative. Neurological: Negative. Subjective:  
 
Visit Vitals /86 Pulse 88 Temp 98.8 °F (37.1 °C) Resp 16 Wt 78.7 kg (173 lb 9.6 oz) SpO2 94% BMI 29.80 kg/m² Physical Exam  
Constitutional: She is oriented to person, place, and time and well-developed, well-nourished, and in no distress. HENT:  
Head: Normocephalic. Eyes: Pupils are equal, round, and reactive to light. Neck: Normal range of motion. Neck supple. Cardiovascular: Normal rate and regular rhythm. Pulmonary/Chest: Effort normal and breath sounds normal.  
Abdominal: Soft. Bowel sounds are normal.  
Musculoskeletal: Normal range of motion. Neurological: She is alert and oriented to person, place, and time. Skin: Skin is warm and dry. Psychiatric: Mood, memory, affect and judgment normal.  
 
 
Cardiographics Telemetry: normal sinus rhythm ECG: normal sinus rhythm, inverted t waves in lateral leads Echocardiogram: 2018 Showed EF of 60% Labs: No results found for this or any previous visit (from the past 24 hour(s)). Patient has been seen and examined by Dr. Juanita Hoyos and he agrees with the following assessment and plan: 
 
 Assessment/Plan:  
  
 Principal Problem: 
  Unstable angina -- continue heparin drip. Trend troponin levels. Start IVF, NS @ 100 cc/hr. Continue ASA. Start metoprolol 25 mg Q12H and lisinopril 2.5 mg.  Repeat labs in AM.  Plan for LHC in the AM. Active Problems: S/P PTCA (percutaneous transluminal coronary angioplasty) (7/3/2017) Mixed hyperlipidemia -- check lipids in the AM 
 
  SOB (shortness of breath) -- see above Dyslipidemia-- see above Katlin Enriquez NP 
5/19/2019 7:48 PM

## 2019-05-19 NOTE — PROGRESS NOTES
Bedside and Verbal shift change report given to self (oncoming nurse) by Ashlie Castellon (offgoing nurse). Report included the following information SBAR, Kardex, MAR and Recent Results.

## 2019-05-19 NOTE — PROGRESS NOTES
TRANSFER - IN REPORT: 
 
Verbal report received from [facility], RN on Angelica Estrada being received from [facility] for routine progression of care Report consisted of patients Situation, Background, Assessment and Recommendations(SBAR). Information from the following report(s) SBAR and MAR was reviewed with the receiving nurse. Opportunity for questions and clarification was provided. Patient arrived to room 316 at 1845 and placed on telemtry monitor. Verbal bedside report given to katlin Jean RN. Patient's situation, background, assessment and recommendations provided. Opportunity for questions provided. Oncoming RN assumed care of patient.

## 2019-05-20 LAB
ALBUMIN SERPL-MCNC: 3 G/DL (ref 3.5–5)
ALBUMIN/GLOB SERPL: 1.1 {RATIO} (ref 1.2–3.5)
ALP SERPL-CCNC: 63 U/L (ref 50–136)
ALT SERPL-CCNC: 17 U/L (ref 12–65)
ANION GAP SERPL CALC-SCNC: 7 MMOL/L (ref 7–16)
APTT PPP: 75.4 SEC (ref 24.7–39.8)
APTT PPP: 82.2 SEC (ref 24.7–39.8)
AST SERPL-CCNC: 12 U/L (ref 15–37)
ATRIAL RATE: 83 BPM
BILIRUB SERPL-MCNC: 0.2 MG/DL (ref 0.2–1.1)
BNP SERPL-MCNC: 21 PG/ML
BUN SERPL-MCNC: 15 MG/DL (ref 6–23)
CALCIUM SERPL-MCNC: 8.6 MG/DL (ref 8.3–10.4)
CALCULATED P AXIS, ECG09: 51 DEGREES
CALCULATED R AXIS, ECG10: 16 DEGREES
CALCULATED T AXIS, ECG11: -4 DEGREES
CHLORIDE SERPL-SCNC: 111 MMOL/L (ref 98–107)
CHOLEST SERPL-MCNC: 165 MG/DL
CO2 SERPL-SCNC: 27 MMOL/L (ref 21–32)
CREAT SERPL-MCNC: 0.87 MG/DL (ref 0.6–1)
DIAGNOSIS, 93000: NORMAL
ERYTHROCYTE [DISTWIDTH] IN BLOOD BY AUTOMATED COUNT: 14.6 % (ref 11.9–14.6)
EST. AVERAGE GLUCOSE BLD GHB EST-MCNC: 108 MG/DL
FERRITIN SERPL-MCNC: 187 NG/ML (ref 8–388)
GLOBULIN SER CALC-MCNC: 2.7 G/DL (ref 2.3–3.5)
GLUCOSE SERPL-MCNC: 130 MG/DL (ref 65–100)
HBA1C MFR BLD: 5.4 % (ref 4.8–6)
HCT VFR BLD AUTO: 29.3 % (ref 35.8–46.3)
HCT VFR BLD AUTO: 30.1 % (ref 35.8–46.3)
HDLC SERPL-MCNC: 40 MG/DL (ref 40–60)
HDLC SERPL: 4.1 {RATIO}
HGB BLD-MCNC: 9.4 G/DL (ref 11.7–15.4)
HGB BLD-MCNC: 9.8 G/DL (ref 11.7–15.4)
IRON SATN MFR SERPL: 18 %
IRON SERPL-MCNC: 39 UG/DL (ref 35–150)
LDLC SERPL CALC-MCNC: 90.4 MG/DL
LIPID PROFILE,FLP: ABNORMAL
MAGNESIUM SERPL-MCNC: 1.9 MG/DL (ref 1.8–2.4)
MCH RBC QN AUTO: 27.9 PG (ref 26.1–32.9)
MCHC RBC AUTO-ENTMCNC: 32.6 G/DL (ref 31.4–35)
MCV RBC AUTO: 85.8 FL (ref 79.6–97.8)
NRBC # BLD: 0 K/UL (ref 0–0.2)
P-R INTERVAL, ECG05: 172 MS
PLATELET # BLD AUTO: 203 K/UL (ref 150–450)
PMV BLD AUTO: 9.1 FL (ref 9.4–12.3)
POTASSIUM SERPL-SCNC: 3.9 MMOL/L (ref 3.5–5.1)
PROT SERPL-MCNC: 5.7 G/DL (ref 6.3–8.2)
Q-T INTERVAL, ECG07: 408 MS
QRS DURATION, ECG06: 76 MS
QTC CALCULATION (BEZET), ECG08: 479 MS
RBC # BLD AUTO: 3.51 M/UL (ref 4.05–5.2)
SODIUM SERPL-SCNC: 145 MMOL/L (ref 136–145)
TIBC SERPL-MCNC: 215 UG/DL (ref 250–450)
TRIGL SERPL-MCNC: 173 MG/DL (ref 35–150)
TROPONIN I SERPL-MCNC: <0.02 NG/ML (ref 0.02–0.05)
VENTRICULAR RATE, ECG03: 83 BPM
VLDLC SERPL CALC-MCNC: 34.6 MG/DL (ref 6–23)
WBC # BLD AUTO: 7.5 K/UL (ref 4.3–11.1)

## 2019-05-20 PROCEDURE — 85018 HEMOGLOBIN: CPT

## 2019-05-20 PROCEDURE — 85730 THROMBOPLASTIN TIME PARTIAL: CPT

## 2019-05-20 PROCEDURE — C1769 GUIDE WIRE: HCPCS

## 2019-05-20 PROCEDURE — 82272 OCCULT BLD FECES 1-3 TESTS: CPT

## 2019-05-20 PROCEDURE — 77030020263 HC SOL INJ SOD CL0.9% LFCR 1000ML

## 2019-05-20 PROCEDURE — 74011000250 HC RX REV CODE- 250: Performed by: INTERNAL MEDICINE

## 2019-05-20 PROCEDURE — 36415 COLL VENOUS BLD VENIPUNCTURE: CPT

## 2019-05-20 PROCEDURE — 99152 MOD SED SAME PHYS/QHP 5/>YRS: CPT

## 2019-05-20 PROCEDURE — 99153 MOD SED SAME PHYS/QHP EA: CPT

## 2019-05-20 PROCEDURE — 80061 LIPID PANEL: CPT

## 2019-05-20 PROCEDURE — 74011250637 HC RX REV CODE- 250/637: Performed by: PHYSICIAN ASSISTANT

## 2019-05-20 PROCEDURE — 83735 ASSAY OF MAGNESIUM: CPT

## 2019-05-20 PROCEDURE — 77030004534 HC CATH ANGI DX INFN CARD -A

## 2019-05-20 PROCEDURE — 4A023N7 MEASUREMENT OF CARDIAC SAMPLING AND PRESSURE, LEFT HEART, PERCUTANEOUS APPROACH: ICD-10-PCS | Performed by: INTERNAL MEDICINE

## 2019-05-20 PROCEDURE — 77030029997 HC DEV COM RDL R BND TELE -B

## 2019-05-20 PROCEDURE — 65660000000 HC RM CCU STEPDOWN

## 2019-05-20 PROCEDURE — 80053 COMPREHEN METABOLIC PANEL: CPT

## 2019-05-20 PROCEDURE — 83540 ASSAY OF IRON: CPT

## 2019-05-20 PROCEDURE — C1894 INTRO/SHEATH, NON-LASER: HCPCS

## 2019-05-20 PROCEDURE — 74011250637 HC RX REV CODE- 250/637: Performed by: NURSE PRACTITIONER

## 2019-05-20 PROCEDURE — 83036 HEMOGLOBIN GLYCOSYLATED A1C: CPT

## 2019-05-20 PROCEDURE — 74011250637 HC RX REV CODE- 250/637: Performed by: INTERNAL MEDICINE

## 2019-05-20 PROCEDURE — 74011636320 HC RX REV CODE- 636/320: Performed by: INTERNAL MEDICINE

## 2019-05-20 PROCEDURE — B2111ZZ FLUOROSCOPY OF MULTIPLE CORONARY ARTERIES USING LOW OSMOLAR CONTRAST: ICD-10-PCS | Performed by: INTERNAL MEDICINE

## 2019-05-20 PROCEDURE — 77030015766

## 2019-05-20 PROCEDURE — 74011250636 HC RX REV CODE- 250/636

## 2019-05-20 PROCEDURE — 85027 COMPLETE CBC AUTOMATED: CPT

## 2019-05-20 PROCEDURE — 83880 ASSAY OF NATRIURETIC PEPTIDE: CPT

## 2019-05-20 PROCEDURE — 93458 L HRT ARTERY/VENTRICLE ANGIO: CPT

## 2019-05-20 PROCEDURE — 84484 ASSAY OF TROPONIN QUANT: CPT

## 2019-05-20 PROCEDURE — 82728 ASSAY OF FERRITIN: CPT

## 2019-05-20 PROCEDURE — 74011250636 HC RX REV CODE- 250/636: Performed by: INTERNAL MEDICINE

## 2019-05-20 PROCEDURE — B2151ZZ FLUOROSCOPY OF LEFT HEART USING LOW OSMOLAR CONTRAST: ICD-10-PCS | Performed by: INTERNAL MEDICINE

## 2019-05-20 PROCEDURE — 74011250636 HC RX REV CODE- 250/636: Performed by: NURSE PRACTITIONER

## 2019-05-20 RX ORDER — GUAIFENESIN 100 MG/5ML
81 LIQUID (ML) ORAL DAILY
Status: DISCONTINUED | OUTPATIENT
Start: 2019-05-21 | End: 2019-05-21 | Stop reason: HOSPADM

## 2019-05-20 RX ORDER — SODIUM CHLORIDE 0.9 % (FLUSH) 0.9 %
5-40 SYRINGE (ML) INJECTION EVERY 8 HOURS
Status: DISCONTINUED | OUTPATIENT
Start: 2019-05-20 | End: 2019-05-21 | Stop reason: HOSPADM

## 2019-05-20 RX ORDER — POLYETHYLENE GLYCOL 3350 17 G/17G
238 POWDER, FOR SOLUTION ORAL ONCE
Status: COMPLETED | OUTPATIENT
Start: 2019-05-20 | End: 2019-05-20

## 2019-05-20 RX ORDER — MIDAZOLAM HYDROCHLORIDE 1 MG/ML
.5-5 INJECTION, SOLUTION INTRAMUSCULAR; INTRAVENOUS
Status: DISCONTINUED | OUTPATIENT
Start: 2019-05-20 | End: 2019-05-21 | Stop reason: HOSPADM

## 2019-05-20 RX ORDER — PANTOPRAZOLE SODIUM 40 MG/1
40 TABLET, DELAYED RELEASE ORAL
Status: DISCONTINUED | OUTPATIENT
Start: 2019-05-20 | End: 2019-05-21 | Stop reason: HOSPADM

## 2019-05-20 RX ORDER — BISACODYL 5 MG
10 TABLET, DELAYED RELEASE (ENTERIC COATED) ORAL
Status: COMPLETED | OUTPATIENT
Start: 2019-05-20 | End: 2019-05-20

## 2019-05-20 RX ORDER — SODIUM CHLORIDE 9 MG/ML
75 INJECTION, SOLUTION INTRAVENOUS CONTINUOUS
Status: DISCONTINUED | OUTPATIENT
Start: 2019-05-20 | End: 2019-05-21 | Stop reason: HOSPADM

## 2019-05-20 RX ORDER — SODIUM CHLORIDE 0.9 % (FLUSH) 0.9 %
5-40 SYRINGE (ML) INJECTION AS NEEDED
Status: DISCONTINUED | OUTPATIENT
Start: 2019-05-20 | End: 2019-05-21 | Stop reason: HOSPADM

## 2019-05-20 RX ORDER — ISOSORBIDE MONONITRATE 30 MG/1
30 TABLET, EXTENDED RELEASE ORAL DAILY
Status: DISCONTINUED | OUTPATIENT
Start: 2019-05-20 | End: 2019-05-21 | Stop reason: HOSPADM

## 2019-05-20 RX ORDER — FENTANYL CITRATE 50 UG/ML
25-100 INJECTION, SOLUTION INTRAMUSCULAR; INTRAVENOUS
Status: DISCONTINUED | OUTPATIENT
Start: 2019-05-20 | End: 2019-05-21 | Stop reason: HOSPADM

## 2019-05-20 RX ORDER — LIDOCAINE HYDROCHLORIDE 10 MG/ML
5-40 INJECTION INFILTRATION; PERINEURAL
Status: DISCONTINUED | OUTPATIENT
Start: 2019-05-20 | End: 2019-05-21 | Stop reason: HOSPADM

## 2019-05-20 RX ORDER — HEPARIN SODIUM 200 [USP'U]/100ML
3 INJECTION, SOLUTION INTRAVENOUS CONTINUOUS
Status: DISCONTINUED | OUTPATIENT
Start: 2019-05-20 | End: 2019-05-20

## 2019-05-20 RX ADMIN — PANTOPRAZOLE SODIUM 40 MG: 40 TABLET, DELAYED RELEASE ORAL at 08:01

## 2019-05-20 RX ADMIN — ISOSORBIDE MONONITRATE 30 MG: 30 TABLET, EXTENDED RELEASE ORAL at 13:20

## 2019-05-20 RX ADMIN — FENTANYL CITRATE 25 MCG: 50 INJECTION, SOLUTION INTRAMUSCULAR; INTRAVENOUS at 11:17

## 2019-05-20 RX ADMIN — LISINOPRIL 2.5 MG: 5 TABLET ORAL at 08:01

## 2019-05-20 RX ADMIN — ACETAMINOPHEN 650 MG: 325 TABLET, FILM COATED ORAL at 04:25

## 2019-05-20 RX ADMIN — HEPARIN SODIUM 3 ML/HR: 5000 INJECTION, SOLUTION INTRAVENOUS; SUBCUTANEOUS at 11:19

## 2019-05-20 RX ADMIN — ASPIRIN 325 MG ORAL TABLET 325 MG: 325 PILL ORAL at 08:01

## 2019-05-20 RX ADMIN — HEPARIN SODIUM 2 ML: 10000 INJECTION INTRAVENOUS; SUBCUTANEOUS at 11:20

## 2019-05-20 RX ADMIN — IOPAMIDOL 105 ML: 755 INJECTION, SOLUTION INTRAVENOUS at 11:36

## 2019-05-20 RX ADMIN — LIDOCAINE HYDROCHLORIDE 3 ML: 10 INJECTION, SOLUTION INFILTRATION; PERINEURAL at 11:22

## 2019-05-20 RX ADMIN — PANTOPRAZOLE SODIUM 40 MG: 40 TABLET, DELAYED RELEASE ORAL at 13:20

## 2019-05-20 RX ADMIN — MIDAZOLAM HYDROCHLORIDE 1 MG: 1 INJECTION, SOLUTION INTRAMUSCULAR; INTRAVENOUS at 11:17

## 2019-05-20 RX ADMIN — ACETAMINOPHEN 650 MG: 325 TABLET, FILM COATED ORAL at 17:05

## 2019-05-20 RX ADMIN — ACETAMINOPHEN 650 MG: 325 TABLET, FILM COATED ORAL at 10:37

## 2019-05-20 RX ADMIN — DULOXETINE HYDROCHLORIDE 60 MG: 60 CAPSULE, DELAYED RELEASE ORAL at 21:06

## 2019-05-20 RX ADMIN — FENTANYL CITRATE 25 MCG: 50 INJECTION, SOLUTION INTRAMUSCULAR; INTRAVENOUS at 11:10

## 2019-05-20 RX ADMIN — PANTOPRAZOLE SODIUM 40 MG: 40 TABLET, DELAYED RELEASE ORAL at 21:06

## 2019-05-20 RX ADMIN — BISACODYL 10 MG: 5 TABLET, COATED ORAL at 17:03

## 2019-05-20 RX ADMIN — POLYETHYLENE GLYCOL 3350 238 G: 17 POWDER, FOR SOLUTION ORAL at 16:57

## 2019-05-20 RX ADMIN — TRAZODONE HYDROCHLORIDE 100 MG: 50 TABLET ORAL at 21:06

## 2019-05-20 RX ADMIN — Medication 10 ML: at 21:07

## 2019-05-20 RX ADMIN — SODIUM CHLORIDE 100 ML/HR: 900 INJECTION, SOLUTION INTRAVENOUS at 06:19

## 2019-05-20 RX ADMIN — MIDAZOLAM HYDROCHLORIDE 1 MG: 1 INJECTION, SOLUTION INTRAMUSCULAR; INTRAVENOUS at 11:10

## 2019-05-20 NOTE — PROGRESS NOTES
[facility] CARDIOLOGY PROGRESS NOTE 
 
5/20/2019 11:48 AM 
 
Admit Date: 5/19/2019 Admit Diagnosis: Unstable angina ([facility] [I20.0] Subjective:  
Stable overnight without CHF, or palpitations but still with persistent chest pressure across chest overnight and this AM with negative ECG and troponins. Vitals stable and controlled. No other complaints overnight. Tolerating meds well. Admits to chronic pain and lots of ibuprofen use recently. Cath ok today without new occlusive CAD, no PCI needed. ...  GI consult today. Objective:  
  
Vitals:  
 05/20/19 0518 05/20/19 0801 05/20/19 8724 05/20/19 1136 BP: 98/59 113/69 125/73 114/71 Pulse: 74 66 (!) 58 70 Resp: 16  20 18 Temp: 97.5 °F (36.4 °C)  97.7 °F (36.5 °C) SpO2: 95%  97% 95% Weight: 78.6 kg (173 lb 3.2 oz) Physical Exam: 
Neck- supple, no JVD 
CV- regular rate and rhythm no MRG Lung- clear bilaterally Abd- soft, nontender, nondistended Ext- no edema Skin- warm and dry Data Review:  
Recent Labs  
  05/20/19 
0350 05/20/19 
0302 NA  --  145 K  --  3.9 MG  --  1.9 BUN  --  15  
CREA  --  0.87 GLU  --  130* WBC  --  7.5 HGB 9.4* 9.8* HCT 29.3* 30.1* PLT  --  203 CHOL  --  165 TRIGL  --  173* HDL  --  40 Assessment and Plan:  
 
Principal Problem: 
  Unstable angina ([facility] (5/19/2019)- persistent CP/tightness overnight, ECG and trop negative. [facility] today with stable non-occlusive CAD. Newly anemic with high dose NSAID use recently- consult GI for possible bleeding ulcers. BID PPI now Active Problems: S/P PTCA (percutaneous transluminal coronary angioplasty) - stable patent LAD stent, see cath report Mixed hyperlipidemia- stable, continue meds Anemia- ? GI blood loss- check iron studies, guiac stool, GI consult today. Gerardo Peraza MD 
[facility] Cardiology Pager [phone]

## 2019-05-20 NOTE — PROGRESS NOTES
Patient had a 3 sec pause on 0357. Patient asymptomatic and VSS. Marilee Caban NP notified. Orders to D/C metoprolol.

## 2019-05-20 NOTE — PROGRESS NOTES
Verbal bedside report given to Montserrat Palmer oncoming RN. Patient's situation, background, assessment and recommendations provided. Opportunity for questions provided. Oncoming RN assumed care of patient.

## 2019-05-20 NOTE — PROGRESS NOTES
Notified CHRISTINA Ellis that patient had 2 sec pause. Patient was asymptomatic. No new orders.  PA stated she notified primary cardiologist.

## 2019-05-20 NOTE — CONSULTS
[facility] Consult Note       Consulting GI Physician: Dr. Dave Aleman    Referring Provider:  Dr. Racquel Andersen Date:  5/20/2019    Admit Date:  5/19/2019    Chief Complaint:  PERSISTENT CP, NEGATIVE CATH, USING EXCESSIVE NSAIDS PAST SEVERAL WEEKS    Subjective:     History of Present Illness:  Patient is a 63 y.o. female with PMH of HLD, CAD s/p PCI 2016, Cholecystectomy, who is seen in consultation at the request of Dr. Dave Aleman for persistent chest pain in pt with negative troponins, EKG, LHC today with stable, non-occlusive CAD. Labs on admission were revealing for Hgb decreased at 9.8, MCV 85.8 (decreased from Hgb 14.0 in 3/1/19) with normal BUN/Creatinine, LFTs, Hgb A1C 5.4. Iron level is ordered and pending. Stool guaiac is checked and pending. She is currently on BID PPI. She reports terrible indigestion for 8-9months with nausea (no vomiting), burning epigastric pain. She denies PPI/H2 blocker. She does take 5-6 Tums a few times daily. She has been taking Ibuprofen (4) 200mg tablets every 6hrs daily for 2yrs for chronic knee pain. Also on ASA 325mg daily. No other blood thinners. No tobacco or ETOH. She reports alternating diarrhea and constipation with diagnosis of IBS in the 1970s-1980s. No recent worsening in bowel patterns. No overt GI bleeding. Appetite and weight have been ok. No c/o shortness of breath. Chest pain persist though improved some. She reports having an EGD in the 1990s that revealed a gastric ulcer of unknown origin. She reports colonoscopy at least 10yrs ago was normal. No known Fhx GI malignancy.     PMH:  Past Medical History:   Diagnosis Date    CAD (coronary artery disease)     Psychiatric disorder        PSH:  Past Surgical History:   Procedure Laterality Date    ABDOMEN SURGERY PROC UNLISTED      CARDIAC SURG PROCEDURE UNLIST      stent 2016    HX GI      cholesectomy    HX GYN      hysterectomy    HX HEENT      NEUROLOGICAL PROCEDURE UNLISTED      neck surgery       Allergies: Allergies   Allergen Reactions    Lortab [Hydrocodone-Acetaminophen] Rash    Pcn [Penicillins] Rash       Home Medications:  Prior to Admission medications    Medication Sig Start Date End Date Taking? Authorizing Provider   DULoxetine (CYMBALTA) 60 mg capsule Take 60 mg by mouth daily. Indications: Anxiousness associated with Depression   Yes Provider, Historical   trazodone HCl (DESYREL PO) Take 250 mg by mouth nightly. Indications: Pain   Yes Provider, Historical   nitroglycerin (NITROSTAT) 0.4 mg SL tablet 1 Tab by SubLINGual route every five (5) minutes as needed for Chest Pain. 7/3/17  Yes Karen Retana MD   aspirin (ASPIRIN) 325 mg tablet Take 325 mg by mouth daily. Yes Provider, Historical   ondansetron (ZOFRAN ODT) 8 mg disintegrating tablet Take 1 Tab by mouth every eight (8) hours as needed for Nausea.  3/1/19   Chalino Sanz MD       [facility] Medications:  Current Facility-Administered Medications   Medication Dose Route Frequency    fentaNYL citrate (PF) injection  mcg   mcg IntraVENous Multiple    midazolam (VERSED) injection 0.5-5 mg  0.5-5 mg IntraVENous Multiple    lidocaine (XYLOCAINE) 10 mg/mL (1 %) injection 5-40 mL  5-40 mL IntraDERMal Multiple    0.9% sodium chloride infusion  75 mL/hr IntraVENous CONTINUOUS    sodium chloride (NS) flush 5-40 mL  5-40 mL IntraVENous Q8H    sodium chloride (NS) flush 5-40 mL  5-40 mL IntraVENous PRN    pantoprazole (PROTONIX) tablet 40 mg  40 mg Oral ACB&D    isosorbide mononitrate ER (IMDUR) tablet 30 mg  30 mg Oral DAILY    [START ON 5/21/2019] aspirin chewable tablet 81 mg  81 mg Oral DAILY    pantoprazole (PROTONIX) tablet 40 mg  40 mg Oral DAILY    traZODone (DESYREL) tablet 100 mg  100 mg Oral QHS    cyclobenzaprine (FLEXERIL) tablet 10 mg  10 mg Oral TID PRN    sodium chloride (NS) flush 5-40 mL  5-40 mL IntraVENous PRN    nitroglycerin (NITROSTAT) tablet 0.4 mg 0.4 mg SubLINGual Q5MIN PRN    acetaminophen (TYLENOL) tablet 650 mg  650 mg Oral Q4H PRN    morphine injection 2 mg  2 mg IntraVENous Q4H PRN    0.9% sodium chloride infusion  100 mL/hr IntraVENous CONTINUOUS    lisinopril (PRINIVIL, ZESTRIL) tablet 2.5 mg  2.5 mg Oral DAILY    temazepam (RESTORIL) capsule 15 mg  15 mg Oral QHS PRN    DULoxetine (CYMBALTA) capsule 60 mg  60 mg Oral QHS       Social History:  Social History     Tobacco Use    Smoking status: Never Smoker    Smokeless tobacco: Never Used   Substance Use Topics    Alcohol use: No       Family History:  No known Fhx GI malignancy. Review of Systems:  A detailed 10 system ROS is obtained, with pertinent positives as listed above. All others are negative. Diet:  NPO    Objective:     Physical Exam:  Vitals:  Visit Vitals  /64 (BP 1 Location: Left arm, BP Patient Position: At rest)   Pulse (!) 56   Temp 97.4 °F (36.3 °C)   Resp 16   Wt 78.6 kg (173 lb 3.2 oz)   SpO2 97%   BMI 29.73 kg/m²     Gen:  Pt is alert, cooperative, no acute distress  Skin: Face reveal no rashes. HEENT: Sclerae anicteric. Cardiovascular: Regular rate and rhythm. Respiratory:  Comfortable breathing with no accessory muscle use. Clear breath sounds anteriorly with no wheezes, rales, or rhonchi. GI:  Abdomen nondistended, soft. +Epigastric ttp. Normal active bowel sounds. No obvious enlargement of the liver or spleen. No obvious masses palpable. Rectal:  Deferred  Musculoskeletal:  No pitting edema of the lower legs. Neurological:  Gross memory appears intact. Patient is alert and oriented. Psychiatric:  Mood appears appropriate with judgement intact.     Laboratory:    Recent Labs     05/20/19  1041 05/20/19  0350 05/20/19  0302   WBC  --   --  7.5   HGB  --  9.4* 9.8*   HCT  --  29.3* 30.1*   PLT  --   --  203   MCV  --   --  85.8   NA  --   --  145   K  --   --  3.9   CL  --   --  111*   CO2  --   --  27   BUN  --   --  15   CREA  --   --  0.87 CA  --   --  8.6   MG  --   --  1.9   GLU  --   --  130*   AP  --   --  63   SGOT  --   --  12*   ALT  --   --  17   TBILI  --   --  0.2   ALB  --   --  3.0*   TP  --   --  5.7*   APTT 75.4*  --  82.2*          Assessment:     Principal Problem:    Unstable angina (HCC) (5/19/2019)    Active Problems:    S/P PTCA (percutaneous transluminal coronary angioplasty) (7/3/2017)      Mixed hyperlipidemia (7/3/2017)      SOB (shortness of breath) (7/24/2017)      Dyslipidemia (7/24/2017)       63 y.o. female with PMH of IBS with alternating constipation and diarrhea, HLD, CAD s/p PCI 2016, Cholecystectomy, who is seen in consultation at the request of Dr. Nicole Morales for persistent chest pain in pt with negative troponins, EKG, LHC today with stable, non-occlusive CAD. On this interview reports terrible indigestion, nausea, epigastric burning for 8-9mo requiring frequent Tums. No PPI/H2 blocker as outpt. +Chronic NSAID use (Ibuprofen 200mg, 4tabs q6hrs for 2yrs for chronic knee pain). No tobacco or ETOH. Labs on admission were revealing for Hgb decreased at 9.8, MCV 85.8 (decreased from Hgb 14.0 in 3/1/19) with normal BUN/Creatinine, LFTs, Hgb A1C 5.4. Iron level is ordered and pending. No overt GI bleeding. Stool guaiac is checked and pending. She is currently on BID PPI. Per pt, EGD in the 1990s revealed a gastric ulcer of unknown origin and Colonoscopy at least 10yrs ago was normal.  She reports these were with Dr. Justin Morillo prior to when she was - either under a last name of Danial or Yomi Castillo. No known Fhx GI malignancy. Plan:     -Continue PPI b.i.d  -Avoid NSAIDs  -Supportive care with anti-emetics p.r.n, IVF hydration  -Will order iron studies to add on to AM labs. -Follow trend of H/H and consider transfusion of pRBCs if needed  -Clear liquid for now. Plan EGD with Colonoscopy tomorrow with Dr. Nicole Armstrong to evaluate for PUD, AVMs, polyps, neoplastic lesion.     Further recommendations will be based upon pt clinical course and findings on EGD and Colonoscopy. Jlui Garcia PA-C  [facility]    Patient is seen and examined in collaboration with Dr. Zina Flowers. Assessment and plan as per Dr. Zina Flowers.

## 2019-05-20 NOTE — PROGRESS NOTES
Report received from [address]. Procedural findings communicated. Intra procedural  medication administration reviewed. Progression of care discussed. Patient received into CPRU Placer 6 post sheath removal.  
 
right Radial access site without bleeding or swelling TR band dry and intact Patient instructed to limit movement to right upper extremity Routine post procedural vital signs and site assessment initiated

## 2019-05-20 NOTE — PROGRESS NOTES
SEE [facility] REPORT- 
STABLE ANATOMY, PERSISTENT CHEST TIGHTNESS WITH NEGATIVE TROPONIN OVERNIGHT 
HGB 14 IN MARCH, NOW 9. ADMITS TO HIGHER DOSE NSAID USE 2-3 TIMES DAILY FOR THE PAST SEVERAL WEEKS 
CONSULT GI TODAY FOR POSSIBLE ENDOSCOPY, RECHECK H/H TOMORROW AND TRANSFUSE AS NEEDED.  
 
DAVID

## 2019-05-20 NOTE — PROGRESS NOTES
Bedside and Verbal shift change report given to self (oncoming nurse) by Arun Tadeo (offgoing nurse). Report included the following information SBAR, Kardex, MAR and Recent Results.

## 2019-05-20 NOTE — PROGRESS NOTES
TRANSFER - OUT REPORT: 
 
Verbal report given to Oneil Angela RN(name) on [facility]  being transferred to cpru(unit) for routine progression of care Report consisted of patients Situation, Background, Assessment and  
Recommendations(SBAR). Information from the following report(s) SBAR was reviewed with the receiving nurse. is allergic to lortab [hydrocodone-acetaminophen] and pcn [penicillins]. Opportunity for questions and clarification was provided. Procedure Summary:Pt had LHC via R wrist, site sealed with R band using 11 ml at 1135 hrs. Med Administration Versed:  2 mg Fentanyl: 50 mcg Visit Vitals /71 (BP 1 Location: Left arm, BP Patient Position: Supine) Pulse 70 Temp 97.7 °F (36.5 °C) Resp 18 Wt 78.6 kg (173 lb 3.2 oz) SpO2 95% BMI 29.73 kg/m² Past Medical History:  
Diagnosis Date  CAD (coronary artery disease)  Psychiatric disorder Peripheral IV 05/19/19 Anterior;Right Forearm (Active) Site Assessment Clean, dry, & intact 5/20/2019  7:45 AM  
Phlebitis Assessment 0 5/20/2019  7:45 AM  
Infiltration Assessment 0 5/20/2019  7:45 AM  
Dressing Status Clean, dry, & intact 5/20/2019  7:45 AM  
Dressing Type Transparent;Tape 5/20/2019  7:45 AM  
Hub Color/Line Status Patent; Infusing 5/20/2019  7:45 AM  
Alcohol Cap Used No 5/20/2019  7:45 AM  
   
Peripheral IV 05/19/19 Left Antecubital (Active) Site Assessment Clean, dry, & intact 5/20/2019  7:45 AM  
Phlebitis Assessment 0 5/20/2019  7:45 AM  
Infiltration Assessment 0 5/20/2019  7:45 AM  
Dressing Status Clean, dry, & intact 5/20/2019  7:45 AM  
Dressing Type Transparent;Tape 5/20/2019  7:45 AM  
Hub Color/Line Status Patent; Infusing 5/20/2019  7:45 AM  
Alcohol Cap Used No 5/20/2019  7:45 AM

## 2019-05-20 NOTE — PROGRESS NOTES
Care Management Interventions PCP Verified by CM: Yes(last seen May 2019) Palliative Care Criteria Met (RRAT>21 & CHF Dx)?: No(RRAT 3 Dx Unstable angina ) Transition of Care Consult (CM Consult): Discharge Planning Discharge Durable Medical Equipment: No(none) Physical Therapy Consult: No 
Occupational Therapy Consult: No 
Speech Therapy Consult: No 
Current Support Network: Lives Alone Confirm Follow Up Transport: Self Plan discussed with Pt/Family/Caregiver: Yes Freedom of Choice Offered: Yes Discharge Location Discharge Placement: Home Met with patient for d/c planning. Patient alert and oriented x 3, independent of ADL's and lives alone in mobile home with 6 steps to entrance. She requires no DME and is able to drive. She has [facility] [employer] and is able to obtain medications without difficulty at [facility] in [address] [place] [address]. She has 2 adult sons that can assist her if needed. She voices no concerns or needs for d/c. Current d/c plan is home when medically stable. CM following.

## 2019-05-20 NOTE — PROGRESS NOTES
Problem: Falls - Risk of 
Goal: *Absence of Falls Description Document Thais Mariscal Fall Risk and appropriate interventions in the flowsheet. Outcome: Progressing Towards Goal 
Note:  
Fall Risk Interventions: 
  
 
  
 
Medication Interventions: Teach patient to arise slowly Problem: Unstable angina/NSTEMI: Day of Admission/Day 1 Goal: Diagnostic Test/Procedures Outcome: Progressing Towards Goal 
Note:  
Patient scheduled for heart cath today.

## 2019-05-20 NOTE — PROGRESS NOTES
TRANSFER - IN REPORT: 
 
Verbal report received from 316 , RN on Caty Jackson  being received from [facility] for routine progression of care. Report consisted of patients Situation, Background, Assessment and  
Recommendations(SBAR). Information from the following reports was reviewed: Kardex, Procedure Summary, MAR and Recent Results. Opportunity for questions and clarification was provided. Assessment completed upon patients arrival to unit and care assumed. Patient received to room 311 and assessment completed. Patient connected to telemetry monitor and eagle with BP cycling every 15 minutes. Patient oriented to room and plan of care reviewed. Patient voiced understanding of keeping wrist immobilized. Right radial site benign, dressing dry and intact, no hematoma; R band in place. Patient provided with clear liquids. Patient aware to use call light to communicate needs. Instructed patient to not use arm for any pushing or pulling.

## 2019-05-20 NOTE — PROCEDURES
Brief Cardiac Procedure Note    Patient: Liudmila Hdez MRN: [id number]  SSN: [id number]    YOB: 1960  Age: 63 y.o. Sex: female      Date of Procedure: 5/20/2019     Pre-procedure Diagnosis: Atypical Angina    Post-procedure Diagnosis: stable CAD, ? GI pain    Reason for Procedure: Suspected CAD    Procedure: Left Heart Catheterization    Brief Description of Procedure: LHC    Performed By: Christophe Michelle MD     Assistants: NONE    Anesthesia: Moderate Sedation    Estimated Blood Loss: Less than 10 mL      Specimens: None    Implants: None    Findings:   LV NORMAL  CORS NO CHANGE FROM 2015 IMAGING, LAD PROX 40% CA++ BIFURCATION STENOSIS  MID RCA IRREG 40%, OSTIAL PDA 50%  RIGHT RADIAL    HAVING CHEST PRESSURE LYING FLAT IN BED THIS AM, PERSISTENT  ADMITS TO TAKING 4 IBUPROFEN 2-3 TIMES DAILY FOR THE PAST SEVERAL WEEKS FOR PAIN  ? ULCER DISEASE- CONSULT GI TODAY, BID PPI   NO TARGET FOR INTERVENTION, CAD NOT CAUSING RESTING SYMPTOMS THIS AM    Complications: None    Recommendations: Continue medical therapy.     Signed By: Christophe Michelle MD     May 20, 2019

## 2019-05-20 NOTE — PROGRESS NOTES
Bedside and Verbal shift change report given to [facility], [facility] (oncoming nurse) by self (offgoing nurse). Report included the following information SBAR, Kardex, MAR and Recent Results.   
 
Heparin gtt verified with oncoming RN

## 2019-05-20 NOTE — PROGRESS NOTES
TRANSFER - OUT REPORT: 
 
Verbal report given to [facility], [facility] RN(name) on Angelica Estrada  Returning to room 316(unit) for routine progression of care Report consisted of patients Situation, Background, Assessment and  
Recommendations(SBAR). Information from the following report(s) Procedure Summary was reviewed with the receiving nurse. Lines:  
Peripheral IV 05/19/19 Anterior;Right Forearm (Active) Site Assessment Clean, dry, & intact 5/20/2019  7:45 AM  
Phlebitis Assessment 0 5/20/2019  7:45 AM  
Infiltration Assessment 0 5/20/2019  7:45 AM  
Dressing Status Clean, dry, & intact 5/20/2019  7:45 AM  
Dressing Type Transparent;Tape 5/20/2019  7:45 AM  
Hub Color/Line Status Patent; Infusing 5/20/2019  7:45 AM  
Alcohol Cap Used No 5/20/2019  7:45 AM  
   
Peripheral IV 05/19/19 Left Antecubital (Active) Site Assessment Clean, dry, & intact 5/20/2019  7:45 AM  
Phlebitis Assessment 0 5/20/2019  7:45 AM  
Infiltration Assessment 0 5/20/2019  7:45 AM  
Dressing Status Clean, dry, & intact 5/20/2019  7:45 AM  
Dressing Type Transparent;Tape 5/20/2019  7:45 AM  
Hub Color/Line Status Patent; Infusing 5/20/2019  7:45 AM  
Alcohol Cap Used No 5/20/2019  7:45 AM  
  
 
Opportunity for questions and clarification was provided. Patient transported with: 
 Hospital transport

## 2019-05-20 NOTE — PROGRESS NOTES
Verbal bedside report received from [facility]. Assumed care of patient. Heparin IV drip verified at bedside with outgoing RN.

## 2019-05-20 NOTE — H&P (VIEW-ONLY)
[facility] Consult Note Consulting GI Physician: Dr. Jayde Cruz Referring Provider:  Dr. Veronica Cuevas Consult Date:  5/20/2019 Admit Date:  5/19/2019 Chief Complaint:  PERSISTENT CP, NEGATIVE CATH, USING EXCESSIVE NSAIDS PAST SEVERAL WEEKS Subjective:  
 
History of Present Illness:  Patient is a 63 y.o. female with PMH of HLD, CAD s/p PCI 2016, Cholecystectomy, who is seen in consultation at the request of Dr. Jayde Cruz for persistent chest pain in pt with negative troponins, EKG, LHC today with stable, non-occlusive CAD. Labs on admission were revealing for Hgb decreased at 9.8, MCV 85.8 (decreased from Hgb 14.0 in 3/1/19) with normal BUN/Creatinine, LFTs, Hgb A1C 5.4. Iron level is ordered and pending. Stool guaiac is checked and pending. She is currently on BID PPI. She reports terrible indigestion for 8-9months with nausea (no vomiting), burning epigastric pain. She denies PPI/H2 blocker. She does take 5-6 Tums a few times daily. She has been taking Ibuprofen (4) 200mg tablets every 6hrs daily for 2yrs for chronic knee pain. Also on ASA 325mg daily. No other blood thinners. No tobacco or ETOH. She reports alternating diarrhea and constipation with diagnosis of IBS in the 1970s-1980s. No recent worsening in bowel patterns. No overt GI bleeding. Appetite and weight have been ok. No c/o shortness of breath. Chest pain persist though improved some. She reports having an EGD in the 1990s that revealed a gastric ulcer of unknown origin. She reports colonoscopy at least 10yrs ago was normal. No known Fhx GI malignancy. PMH: 
Past Medical History:  
Diagnosis Date  CAD (coronary artery disease)  Psychiatric disorder PSH: 
Past Surgical History:  
Procedure Laterality Date [address] [place] UNLISTED  CARDIAC SURG PROCEDURE UNLIST    
 stent 2016  HX GI    
 cholesectomy  HX GYN    
 hysterectomy  HX HEENT    
  NEUROLOGICAL PROCEDURE UNLISTED    
 neck surgery Allergies: Allergies Allergen Reactions  Lortab [Hydrocodone-Acetaminophen] Rash  Pcn [Penicillins] Rash Home Medications: 
Prior to Admission medications Medication Sig Start Date End Date Taking? Authorizing Provider DULoxetine (CYMBALTA) 60 mg capsule Take 60 mg by mouth daily. Indications: Anxiousness associated with Depression   Yes Provider, Historical  
trazodone HCl (DESYREL PO) Take 250 mg by mouth nightly. Indications: Pain   Yes Provider, Historical  
nitroglycerin (NITROSTAT) 0.4 mg SL tablet 1 Tab by SubLINGual route every five (5) minutes as needed for Chest Pain. 7/3/17  Yes Elida Morel MD  
aspirin (ASPIRIN) 325 mg tablet Take 325 mg by mouth daily. Yes Provider, Historical  
ondansetron (ZOFRAN ODT) 8 mg disintegrating tablet Take 1 Tab by mouth every eight (8) hours as needed for Nausea. 3/1/19   Sarah Alas MD  
 
 
[facility] Medications: 
Current Facility-Administered Medications Medication Dose Route Frequency  fentaNYL citrate (PF) injection  mcg   mcg IntraVENous Multiple  midazolam (VERSED) injection 0.5-5 mg  0.5-5 mg IntraVENous Multiple  lidocaine (XYLOCAINE) 10 mg/mL (1 %) injection 5-40 mL  5-40 mL IntraDERMal Multiple  0.9% sodium chloride infusion  75 mL/hr IntraVENous CONTINUOUS  
 sodium chloride (NS) flush 5-40 mL  5-40 mL IntraVENous Q8H  
 sodium chloride (NS) flush 5-40 mL  5-40 mL IntraVENous PRN  pantoprazole (PROTONIX) tablet 40 mg  40 mg Oral ACB&D  
 isosorbide mononitrate ER (IMDUR) tablet 30 mg  30 mg Oral DAILY  [START ON 5/21/2019] aspirin chewable tablet 81 mg  81 mg Oral DAILY  pantoprazole (PROTONIX) tablet 40 mg  40 mg Oral DAILY  traZODone (DESYREL) tablet 100 mg  100 mg Oral QHS  cyclobenzaprine (FLEXERIL) tablet 10 mg  10 mg Oral TID PRN  
 sodium chloride (NS) flush 5-40 mL  5-40 mL IntraVENous PRN  
  nitroglycerin (NITROSTAT) tablet 0.4 mg  0.4 mg SubLINGual Q5MIN PRN  
 acetaminophen (TYLENOL) tablet 650 mg  650 mg Oral Q4H PRN  
 morphine injection 2 mg  2 mg IntraVENous Q4H PRN  
 0.9% sodium chloride infusion  100 mL/hr IntraVENous CONTINUOUS  
 lisinopril (PRINIVIL, ZESTRIL) tablet 2.5 mg  2.5 mg Oral DAILY  temazepam (RESTORIL) capsule 15 mg  15 mg Oral QHS PRN  
 DULoxetine (CYMBALTA) capsule 60 mg  60 mg Oral QHS Social History: 
Social History Tobacco Use  Smoking status: Never Smoker  Smokeless tobacco: Never Used Substance Use Topics  Alcohol use: No  
 
 
Family History: 
No known Fhx GI malignancy. Review of Systems: A detailed 10 system ROS is obtained, with pertinent positives as listed above. All others are negative. Diet:  NPO Objective:  
 
Physical Exam: 
Vitals: 
Visit Vitals /64 (BP 1 Location: Left arm, BP Patient Position: At rest) Pulse (!) 56 Temp 97.4 °F (36.3 °C) Resp 16 Wt 78.6 kg (173 lb 3.2 oz) SpO2 97% BMI 29.73 kg/m² Gen:  Pt is alert, cooperative, no acute distress Skin: Face reveal no rashes. HEENT: Sclerae anicteric. Cardiovascular: Regular rate and rhythm. Respiratory:  Comfortable breathing with no accessory muscle use. Clear breath sounds anteriorly with no wheezes, rales, or rhonchi. GI:  Abdomen nondistended, soft. +Epigastric ttp. Normal active bowel sounds. No obvious enlargement of the liver or spleen. No obvious masses palpable. Rectal:  Deferred Musculoskeletal:  No pitting edema of the lower legs. Neurological:  Gross memory appears intact. Patient is alert and oriented. Psychiatric:  Mood appears appropriate with judgement intact. Laboratory:   
Recent Labs  
  05/20/19 
1041 05/20/19 
0350 05/20/19 
0302 WBC  --   --  7.5 HGB  --  9.4* 9.8* HCT  --  29.3* 30.1* PLT  --   --  203 MCV  --   --  85.8 NA  --   --  145 K  --   --  3.9 CL  --   --  111* CO2  --   --  27 BUN  --   --  15  
CREA  --   --  0.87 CA  --   --  8.6 MG  --   --  1.9 GLU  --   --  130* AP  --   --  61 SGOT  --   --  12* ALT  --   --  17  
TBILI  --   --  0.2 ALB  --   --  3.0*  
TP  --   --  5.7* APTT 75.4*  --  82.2* Assessment:  
 
Principal Problem: 
  Unstable angina ([address] (5/19/2019) Active Problems: S/P PTCA (percutaneous transluminal coronary angioplasty) (7/3/2017) Mixed hyperlipidemia (7/3/2017) SOB (shortness of breath) (7/24/2017) Dyslipidemia (7/24/2017) 63 y.o. female with PMH of IBS with alternating constipation and diarrhea, HLD, CAD s/p PCI 2016, Cholecystectomy, who is seen in consultation at the request of Dr. Samia Bain for persistent chest pain in pt with negative troponins, EKG, LHC today with stable, non-occlusive CAD. On this interview reports terrible indigestion, nausea, epigastric burning for 8-9mo requiring frequent Tums. No PPI/H2 blocker as outpt. +Chronic NSAID use (Ibuprofen 200mg, 4tabs q6hrs for 2yrs for chronic knee pain). No tobacco or ETOH. Labs on admission were revealing for Hgb decreased at 9.8, MCV 85.8 (decreased from Hgb 14.0 in 3/1/19) with normal BUN/Creatinine, LFTs, Hgb A1C 5.4. Iron level is ordered and pending. No overt GI bleeding. Stool guaiac is checked and pending. She is currently on BID PPI. Per pt, EGD in the 1990s revealed a gastric ulcer of unknown origin and Colonoscopy at least 10yrs ago was normal.  She reports these were with Dr. Arreola Husbands prior to when she was - either under a last name of Danial or Sheila Boles. No known Fhx GI malignancy. Plan:  
 
-Continue PPI b.i.d 
-Avoid NSAIDs 
-Supportive care with anti-emetics p.r.n, IVF hydration 
-Will order iron studies to add on to AM labs. -Follow trend of H/H and consider transfusion of pRBCs if needed 
-Clear liquid for now.   Plan EGD with Colonoscopy tomorrow with  Radha to evaluate for PUD, AVMs, polyps, neoplastic lesion. Further recommendations will be based upon pt clinical course and findings on EGD and Colonoscopy. Erin Kenny PA-C [facility] Patient is seen and examined in collaboration with Dr. Perla Robles. Assessment and plan as per Dr. Perla Robles.

## 2019-05-20 NOTE — PROGRESS NOTES
Spoke to [employer], NP about changes in home medications. Orders to discontinue Lexapro, change cymbalta to 60mg and add restoril 15mg PRN at bedtime. PTA meds are now up to date

## 2019-05-21 ENCOUNTER — ANESTHESIA (OUTPATIENT)
Dept: ENDOSCOPY | Age: 59
DRG: 392 | End: 2019-05-21
Payer: COMMERCIAL

## 2019-05-21 ENCOUNTER — ANESTHESIA EVENT (OUTPATIENT)
Dept: ENDOSCOPY | Age: 59
DRG: 392 | End: 2019-05-21
Payer: COMMERCIAL

## 2019-05-21 VITALS
BODY MASS INDEX: 28.89 KG/M2 | OXYGEN SATURATION: 98 % | TEMPERATURE: 98.2 F | RESPIRATION RATE: 16 BRPM | DIASTOLIC BLOOD PRESSURE: 62 MMHG | WEIGHT: 168.3 LBS | SYSTOLIC BLOOD PRESSURE: 121 MMHG | HEART RATE: 81 BPM

## 2019-05-21 LAB
ANION GAP SERPL CALC-SCNC: 8 MMOL/L (ref 7–16)
BUN SERPL-MCNC: 8 MG/DL (ref 6–23)
CALCIUM SERPL-MCNC: 9.1 MG/DL (ref 8.3–10.4)
CHLORIDE SERPL-SCNC: 109 MMOL/L (ref 98–107)
CO2 SERPL-SCNC: 25 MMOL/L (ref 21–32)
CREAT SERPL-MCNC: 0.75 MG/DL (ref 0.6–1)
ERYTHROCYTE [DISTWIDTH] IN BLOOD BY AUTOMATED COUNT: 14.7 % (ref 11.9–14.6)
GLUCOSE SERPL-MCNC: 127 MG/DL (ref 65–100)
HCT VFR BLD AUTO: 33.5 % (ref 35.8–46.3)
HEMOCCULT STL QL: NEGATIVE
HGB BLD-MCNC: 11.1 G/DL (ref 11.7–15.4)
MAGNESIUM SERPL-MCNC: 2.1 MG/DL (ref 1.8–2.4)
MCH RBC QN AUTO: 28 PG (ref 26.1–32.9)
MCHC RBC AUTO-ENTMCNC: 33.1 G/DL (ref 31.4–35)
MCV RBC AUTO: 84.4 FL (ref 79.6–97.8)
NRBC # BLD: 0 K/UL (ref 0–0.2)
PLATELET # BLD AUTO: 216 K/UL (ref 150–450)
PMV BLD AUTO: 9.1 FL (ref 9.4–12.3)
POTASSIUM SERPL-SCNC: 3.5 MMOL/L (ref 3.5–5.1)
RBC # BLD AUTO: 3.97 M/UL (ref 4.05–5.2)
SODIUM SERPL-SCNC: 142 MMOL/L (ref 136–145)
WBC # BLD AUTO: 8.3 K/UL (ref 4.3–11.1)

## 2019-05-21 PROCEDURE — 76040000026: Performed by: INTERNAL MEDICINE

## 2019-05-21 PROCEDURE — 74011250637 HC RX REV CODE- 250/637: Performed by: INTERNAL MEDICINE

## 2019-05-21 PROCEDURE — 77030021593 HC FCPS BIOP ENDOSC BSC -A: Performed by: INTERNAL MEDICINE

## 2019-05-21 PROCEDURE — 74011250636 HC RX REV CODE- 250/636

## 2019-05-21 PROCEDURE — 83735 ASSAY OF MAGNESIUM: CPT

## 2019-05-21 PROCEDURE — 85027 COMPLETE CBC AUTOMATED: CPT

## 2019-05-21 PROCEDURE — 0DBE8ZX EXCISION OF LARGE INTESTINE, VIA NATURAL OR ARTIFICIAL OPENING ENDOSCOPIC, DIAGNOSTIC: ICD-10-PCS | Performed by: INTERNAL MEDICINE

## 2019-05-21 PROCEDURE — 36415 COLL VENOUS BLD VENIPUNCTURE: CPT

## 2019-05-21 PROCEDURE — 88305 TISSUE EXAM BY PATHOLOGIST: CPT

## 2019-05-21 PROCEDURE — 0DB78ZX EXCISION OF STOMACH, PYLORUS, VIA NATURAL OR ARTIFICIAL OPENING ENDOSCOPIC, DIAGNOSTIC: ICD-10-PCS | Performed by: INTERNAL MEDICINE

## 2019-05-21 PROCEDURE — 74011250637 HC RX REV CODE- 250/637: Performed by: NURSE PRACTITIONER

## 2019-05-21 PROCEDURE — 80048 BASIC METABOLIC PNL TOTAL CA: CPT

## 2019-05-21 PROCEDURE — 74011250636 HC RX REV CODE- 250/636: Performed by: NURSE PRACTITIONER

## 2019-05-21 PROCEDURE — 0DB98ZX EXCISION OF DUODENUM, VIA NATURAL OR ARTIFICIAL OPENING ENDOSCOPIC, DIAGNOSTIC: ICD-10-PCS | Performed by: INTERNAL MEDICINE

## 2019-05-21 PROCEDURE — 76060000032 HC ANESTHESIA 0.5 TO 1 HR: Performed by: INTERNAL MEDICINE

## 2019-05-21 PROCEDURE — 88312 SPECIAL STAINS GROUP 1: CPT

## 2019-05-21 PROCEDURE — 74011250636 HC RX REV CODE- 250/636: Performed by: ANESTHESIOLOGY

## 2019-05-21 RX ORDER — PANTOPRAZOLE SODIUM 40 MG/1
40 TABLET, DELAYED RELEASE ORAL DAILY
Qty: 30 TAB | Refills: 6 | Status: SHIPPED | OUTPATIENT
Start: 2019-05-22

## 2019-05-21 RX ORDER — PROPOFOL 10 MG/ML
INJECTION, EMULSION INTRAVENOUS AS NEEDED
Status: DISCONTINUED | OUTPATIENT
Start: 2019-05-21 | End: 2019-05-21 | Stop reason: HOSPADM

## 2019-05-21 RX ORDER — LISINOPRIL 2.5 MG/1
2.5 TABLET ORAL DAILY
Qty: 30 TAB | Refills: 6 | Status: SHIPPED | OUTPATIENT
Start: 2019-05-22 | End: 2019-07-23

## 2019-05-21 RX ORDER — ONDANSETRON 2 MG/ML
4 INJECTION INTRAMUSCULAR; INTRAVENOUS
Status: DISCONTINUED | OUTPATIENT
Start: 2019-05-21 | End: 2019-05-21 | Stop reason: HOSPADM

## 2019-05-21 RX ORDER — SODIUM CHLORIDE, SODIUM LACTATE, POTASSIUM CHLORIDE, CALCIUM CHLORIDE 600; 310; 30; 20 MG/100ML; MG/100ML; MG/100ML; MG/100ML
INJECTION, SOLUTION INTRAVENOUS
Status: DISCONTINUED | OUTPATIENT
Start: 2019-05-21 | End: 2019-05-21 | Stop reason: HOSPADM

## 2019-05-21 RX ORDER — SODIUM CHLORIDE, SODIUM LACTATE, POTASSIUM CHLORIDE, CALCIUM CHLORIDE 600; 310; 30; 20 MG/100ML; MG/100ML; MG/100ML; MG/100ML
75 INJECTION, SOLUTION INTRAVENOUS CONTINUOUS
Status: DISCONTINUED | OUTPATIENT
Start: 2019-05-21 | End: 2019-05-21 | Stop reason: HOSPADM

## 2019-05-21 RX ORDER — ISOSORBIDE MONONITRATE 30 MG/1
30 TABLET, EXTENDED RELEASE ORAL DAILY
Qty: 30 TAB | Refills: 6 | Status: SHIPPED | OUTPATIENT
Start: 2019-05-22 | End: 2019-07-23

## 2019-05-21 RX ORDER — PROPOFOL 10 MG/ML
INJECTION, EMULSION INTRAVENOUS
Status: DISCONTINUED | OUTPATIENT
Start: 2019-05-21 | End: 2019-05-21 | Stop reason: HOSPADM

## 2019-05-21 RX ORDER — GUAIFENESIN 100 MG/5ML
81 LIQUID (ML) ORAL DAILY
Qty: 30 TAB | Refills: 11 | Status: SHIPPED | OUTPATIENT
Start: 2019-05-22 | End: 2020-10-21

## 2019-05-21 RX ADMIN — ACETAMINOPHEN 650 MG: 325 TABLET, FILM COATED ORAL at 02:53

## 2019-05-21 RX ADMIN — Medication 10 ML: at 05:26

## 2019-05-21 RX ADMIN — PANTOPRAZOLE SODIUM 40 MG: 40 TABLET, DELAYED RELEASE ORAL at 08:02

## 2019-05-21 RX ADMIN — ONDANSETRON 4 MG: 2 INJECTION INTRAMUSCULAR; INTRAVENOUS at 02:48

## 2019-05-21 RX ADMIN — LISINOPRIL 2.5 MG: 5 TABLET ORAL at 08:02

## 2019-05-21 RX ADMIN — Medication 5 ML: at 14:50

## 2019-05-21 RX ADMIN — PROPOFOL 50 MG: 10 INJECTION, EMULSION INTRAVENOUS at 12:02

## 2019-05-21 RX ADMIN — ASPIRIN 81 MG 81 MG: 81 TABLET ORAL at 08:02

## 2019-05-21 RX ADMIN — PROPOFOL 160 MCG/KG/MIN: 10 INJECTION, EMULSION INTRAVENOUS at 12:02

## 2019-05-21 RX ADMIN — ISOSORBIDE MONONITRATE 30 MG: 30 TABLET, EXTENDED RELEASE ORAL at 08:02

## 2019-05-21 RX ADMIN — MORPHINE SULFATE 2 MG: 2 INJECTION, SOLUTION INTRAMUSCULAR; INTRAVENOUS at 05:26

## 2019-05-21 RX ADMIN — SODIUM CHLORIDE, SODIUM LACTATE, POTASSIUM CHLORIDE, AND CALCIUM CHLORIDE 75 ML/HR: 600; 310; 30; 20 INJECTION, SOLUTION INTRAVENOUS at 10:50

## 2019-05-21 RX ADMIN — PANTOPRAZOLE SODIUM 40 MG: 40 TABLET, DELAYED RELEASE ORAL at 05:26

## 2019-05-21 RX ADMIN — PANTOPRAZOLE SODIUM 40 MG: 40 TABLET, DELAYED RELEASE ORAL at 16:06

## 2019-05-21 RX ADMIN — SODIUM CHLORIDE, SODIUM LACTATE, POTASSIUM CHLORIDE, CALCIUM CHLORIDE: 600; 310; 30; 20 INJECTION, SOLUTION INTRAVENOUS at 11:58

## 2019-05-21 NOTE — PROGRESS NOTES
[facility] CARDIOLOGY PROGRESS NOTE           5/21/2019 4:31 PM    Admit Date: 5/19/2019      Subjective:   Stable CAD and no acute issues with EGD/Colonscopy. GI recommends PPI therapy and follow up of microscopic colitis. ROS:  Cardiovascular:  As noted above    Objective:      Vitals:    05/21/19 1252 05/21/19 1304 05/21/19 1313 05/21/19 1350   BP: 102/54 102/54 112/67 117/67   Pulse: 75 73 64 63   Resp: 16 16 16 16   Temp:    98 °F (36.7 °C)   SpO2: 100% 99% 94% 96%   Weight:           Physical Exam:  General-No Acute Distress  Neck- supple, no JVD  CV- regular rate and rhythm no MRG  Lung- clear bilaterally  Abd- soft, nontender, nondistended  Ext- no edema bilaterally. Skin- warm and dry    Data Review:   Recent Labs     05/21/19  0444 05/20/19  0350 05/20/19  0302  05/19/19  2110     --  145  --   --    K 3.5  --  3.9  --   --    MG 2.1  --  1.9  --   --    BUN 8  --  15  --   --    CREA 0.75  --  0.87  --   --    *  --  130*  --   --    WBC 8.3  --  7.5  --   --    HGB 11.1* 9.4* 9.8*   < >  --    HCT 33.5* 29.3* 30.1*   < >  --      --  203  --   --    TROIQ  --   --  <0.02*  --  <0.02*   CHOL  --   --  165  --   --    LDLC  --   --  90.4  --   --    HDL  --   --  40  --   --     < > = values in this interval not displayed. Assessment/Plan:     Principal Problem:    Unstable angina ([address] (5/19/2019)    Non-cardiac CP.   Reduce ASA to 81mg daily and start PPI    Active Problems:    S/P PTCA (percutaneous transluminal coronary angioplasty) (7/3/2017)    ASA      Mixed hyperlipidemia (7/3/2017)    On no therapy and defer to primary cardiologist       SOB (shortness of breath) (7/24/2017)    Chronic       Dyslipidemia (7/24/2017)    As above    DC home           Nawaf Gomez MD  5/21/2019 4:31 PM

## 2019-05-21 NOTE — PROCEDURES
[facility]  ESOPHAGOGASTRODUODENOSCOPY        DATE of PROCEDURE: 5/21/2019    PT NAME: Callie Heart  [id number]    PHYSICIAN:  Rhona Jane MD    MEDICATION:  MAC    INSTRUMENT: GIFQ    PROCEDURE:  EGD with biopsies, EGD diagnostic    FINDINGS:  OROPHARYNX: Cords and pyriform recesses normal.  ESOPHAGUS: The esophagus is normal. The proximal, mid and distal portions are normal. The Z-Line is intact. GEJ is located at about 34 cm from the incisors. Multiple biopsies obtained from the mid esophagus using cold forceps to rule out EoE. STOMACH: The fundus on antegrade and retroflex views is normal. The body is normal and the, antrum and pylorus has mild erythema concerning for mild gastritis. Multiple biopsies were taken from the antrum, incisura and body of the stomach using cold forceps to rule out H. pylori. DUODENUM: The bulb and second portions are normal.  Six total duodenal biopsies were taken, four from the distal duodenum and two from the bulb using cold forceps. ASSESSMENT:  1. Mild Gastritis    PLAN:  1. Follow up pathology. 2. Start PPI PO every day. 3. Proceed with colonoscopy.     Rhona Jane MD  [facility]

## 2019-05-21 NOTE — PROGRESS NOTES
TRANSFER - OUT REPORT:    Verbal report given to [facility], RN on Sandra Balloon  being transferred to [facility] for routine post - op       Report consisted of patients Situation, Background, Assessment and   Recommendations(SBAR). Information from the following report(s) SBAR was reviewed with the receiving nurse. Lines:   Peripheral IV 05/19/19 Anterior;Right Forearm (Active)   Site Assessment Clean, dry, & intact 5/21/2019  7:30 AM   Phlebitis Assessment 0 5/21/2019 12:44 AM   Infiltration Assessment 0 5/21/2019  7:30 AM   Dressing Status Clean, dry, & intact 5/21/2019  7:30 AM   Dressing Type Transparent;Tape 5/21/2019  7:30 AM   Hub Color/Line Status Patent; Flushed 5/21/2019  7:30 AM   Alcohol Cap Used No 5/21/2019  7:30 AM       Peripheral IV 05/19/19 Left Antecubital (Active)   Site Assessment Clean, dry, & intact 5/21/2019  7:30 AM   Phlebitis Assessment 0 5/21/2019  7:30 AM   Infiltration Assessment 0 5/21/2019  7:30 AM   Dressing Status Clean, dry, & intact 5/21/2019  7:30 AM   Dressing Type Transparent;Tape 5/21/2019  7:30 AM   Hub Color/Line Status Patent; Flushed 5/21/2019  7:30 AM   Alcohol Cap Used No 5/21/2019  7:30 AM        Opportunity for questions and clarification was provided.

## 2019-05-21 NOTE — PROCEDURES
[facility]  COLONOSCOPY      DATE of PROCEDURE: 5/21/2019    PT NAME: Nav Dong  [id number]    PHYSICIAN:  Figueroa Diaz MD    MEDICATION: MAC  INSTRUMENT:CFHQ 190 L  SPECIAL PROCEDURE: Colonoscopy with biopsies, Colonoscopy diagnostic  BLOOD LOSS: None  SPECIMENS: Random Colon Biopsies    PROCEDURE: After obtaining informed consent, the patient was placed in the left lateral position and sedated. A digital rectal exam was performed. The colonoscope was inserted into the rectum and passed under direct vision to the cecum where the ileocecal valve and appendiceal orifice were identified. The colonoscope was withdrawn with careful evaluation of the mucosa. Retroflexion was performed in the rectum. The prep was good. The patient was taken to the recovery area in stable condition. FINDINGS:  ANUS:   Anal exam reveals no masses or hemorrhoids, sphincter tone is normal.  RECTUM: Rectal exam including retroflexion of the rectum reveals no masses or hemorrhoids. SIGMOID COLON: The mucosa is normal with good vascular pattern and without ulcers, diverticula or polyps. DESCENDING COLON: The mucosa is normal with good vascular pattern and without ulcers or diverticula. SPLENIC FLEXURE: The splenic flexure is normal.  TRANSVERSE COLON: The mucosa is normal with good vascular pattern and without ulcers. HEPATIC FLEXURE: The hepatic flexure is normal.  ASCENDING COLON: The mucosa is normal with good vascular pattern and without ulcers, diverticula or polyps. CECUM: The appendiceal orifice appears normal. The ileocecal valve appears normal.  TERMINAL ILEUM: normal mucosa of the terminal ileum. ** Random colon biopsies obtained from the colon using cold forceps to rule out microscopic colitis **    ASSESSMENT:  1. Normal colonoscopy to level of terminal ileum      PLAN:  1. Follow up pathology to rule out microscopic colitis  2. Will sign off. Clinic follow up in 6-8 weeks. Can consider capsule endoscopy at that time if the biopsies are unrevealing.       Perla Robles MD  [facility]

## 2019-05-21 NOTE — PROGRESS NOTES
Problem: Falls - Risk of  Goal: *Absence of Falls  Description  Document Leisa Healy Fall Risk and appropriate interventions in the flowsheet.   Outcome: Progressing Towards Goal     Problem: Patient Education: Go to Patient Education Activity  Goal: Patient/Family Education  Outcome: Progressing Towards Goal     Problem: Unstable angina/NSTEMI: Day of Admission/Day 1  Goal: Off Pathway (Use only if patient is Off Pathway)  Outcome: Resolved/Met  Goal: Activity/Safety  Outcome: Resolved/Met  Goal: Consults, if ordered  Outcome: Resolved/Met  Goal: Diagnostic Test/Procedures  Outcome: Resolved/Met  Goal: Nutrition/Diet  Outcome: Resolved/Met  Goal: Discharge Planning  Outcome: Resolved/Met  Goal: Medications  Outcome: Resolved/Met  Goal: Respiratory  Outcome: Resolved/Met  Goal: Treatments/Interventions/Procedures  Outcome: Resolved/Met  Goal: Psychosocial  Outcome: Resolved/Met  Goal: *Hemodynamically stable  Outcome: Resolved/Met  Goal: *Optimal pain control at patient's stated goal  Outcome: Resolved/Met  Goal: *Lungs clear or at baseline  Outcome: Resolved/Met

## 2019-05-21 NOTE — PROGRESS NOTES
Problem: Falls - Risk of  Goal: *Absence of Falls  Description  Document [facility] Fall Risk and appropriate interventions in the flowsheet.   Outcome: Progressing Towards Goal  Note:   Fall Risk Interventions:       Medication Interventions: Evaluate medications/consider consulting pharmacy, Patient to call before getting OOB                   Problem: Patient Education: Go to Patient Education Activity  Goal: Patient/Family Education  Outcome: Progressing Towards Goal

## 2019-05-21 NOTE — ANESTHESIA PREPROCEDURE EVALUATION
Relevant Problems   No relevant active problems       Anesthetic History               Review of Systems / Medical History      Pulmonary                   Neuro/Psych              Cardiovascular              CAD and cardiac stents    Exercise tolerance: >4 METS  Comments: Cath from yesterday showed LAD stent patent, normal LVEF   GI/Hepatic/Renal     GERD: poorly controlled           Endo/Other             Other Findings            Physical Exam    Airway  Mallampati: II  TM Distance: 4 - 6 cm  Neck ROM: normal range of motion   Mouth opening: Normal     Cardiovascular    Rhythm: regular           Dental    Dentition: Upper partial plate and Full lower dentures     Pulmonary  Breath sounds clear to auscultation               Abdominal         Other Findings            Anesthetic Plan    ASA: 3  Anesthesia type: total IV anesthesia            Anesthetic plan and risks discussed with: Patient

## 2019-05-21 NOTE — PROGRESS NOTES
TRANSFER - IN REPORT:    Verbal report received from Hitesh(name) on Doreen Manriquez  being received from [facility](unit) for routine progression of care      Report consisted of patients Situation, Background, Assessment and   Recommendations(SBAR). Information from the following report(s) SBAR and Kardex was reviewed with the receiving nurse. Opportunity for questions and clarification was provided. Assessment completed upon patients arrival to unit and care assumed.

## 2019-05-21 NOTE — ROUTINE PROCESS
VSS, Report called to [facility], RN. pt transported to Room 316 via stretcher.

## 2019-05-21 NOTE — DISCHARGE INSTRUCTIONS
The patient is being discharged to home on a low saturated fat, low cholesterol diet. Pt is instructed to abstain from any heavy lifting, straining, stooping or driving for 5 days. Pt is instructed to watch groin site ( if groin access was performed) for bleeding/oozing. If so,pt is instructed to apply firm pressure with clean cloth and call office at [phone]. Pt is instructed to watch for signs of infection which include increasing area of redness around site, fever/hot to touch or purulent drainage. Pt is instructed not to soak in a tub bath for 1 week, but it is okay to shower. Left Heart Catheterization: About This Test  What is it? Cardiac catheterization is a test to check the left side of your heart. Your doctor might look at the shape of your heart, the motion of your heart, or the blood pressure inside the chambers. Why is this test done? This test gives information about how your heart is working. It can:  · Check blood flow and blood pressure in the chambers of the heart. · Check the pumping action of the heart. · Find out if a heart defect is present and how severe it is. · Find out how well the heart valves work. What happens during the test?  · You will get medicine to help you relax. · A thin tube called a catheter is put into a blood vessel in the groin or the arm. The doctor moves the catheter through the blood vessel into your heart. · You will get a shot to numb the skin where the catheter goes in. You may feel pressure when the doctor moves the catheter through your blood vessel into your heart. · Dye may be injected into your heart. Your doctor can watch on special monitors as the dye moves in your heart. The dye helps your doctor see blood flow in your heart. · You may feel hot or flushed for several seconds when the dye is put in.  · If a heart defect is found, cardiac catheterization sometimes is used to correct it during the test.  How long does it take?   · The test will take about 30 minutes. If a problem is found and the doctor treats it, it can take a few hours longer. What happens after the test?  · You will stay in a room for at least a few hours to make sure the catheter site starts to heal. You may have a bandage or a compression device on your groin or arm to prevent bleeding. · If the catheter was placed in your groin, you may lie in bed for a few hours. If the catheter was put in your arm, you will need to keep your arm still for at least 1 hour. · You may or may not need to stay in the hospital overnight. You will get more instructions for what to do at home. · Drink plenty of fluids for several hours after the test.  Follow-up care is a key part of your treatment and safety. Be sure to make and go to all appointments, and call your doctor if you are having problems. It's also a good idea to know your test results and keep a list of the medicines you take. Where can you learn more? Go to http://doreen-haroldo.info/. Enter W306 in the search box to learn more about \"Left Heart Catheterization: About This Test.\"  Current as of: July 22, 2018  Content Version: 11.9  © 9327-7793 [employer]. Care instructions adapted under license by [employer] (which disclaims liability or warranty for this information). If you have questions about a medical condition or this instruction, always ask your healthcare professional. [place] [address] any warranty or liability for your use of this information. Isosorbide Mononitrate (Imdur, Imdur ER, Ismo) - (By mouth)   Why this medicine is used:   Prevents angina.   Contact a nurse or doctor right away if you have:  · Severe or ongoing dizziness, lightheadedness, fainting  · Slow heartbeat, new or increased chest pain     Common side effects:  · Mild dizziness, lightheadedness  · Headache  · Feeling of warmth, redness of the face, neck, arms, and upper chest  © 2017 [facility] is for End User's use only and may not be sold, redistributed or otherwise used for commercial purposes. Lisinopril (By mouth)   Lisinopril (lye-SIN-oh-pril)  Treats high blood pressure and heart failure. Also given to reduce the risk of death after a heart attack. This medicine is an ACE inhibitor. Brand Name(s): Prinivil, Qbrelis, Zestril   There may be other brand names for this medicine. When This Medicine Should Not Be Used: This medicine is not right for everyone. Do not use it if you had an allergic reaction to lisinopril or another ACE inhibitor, or if you are pregnant. How to Use This Medicine:   Liquid, Tablet  · Take your medicine as directed. Your dose may need to be changed several times to find what works best for you. · Oral liquid: Measure the oral liquid medicine with a marked measuring spoon, oral syringe, or medicine cup. · Missed dose: Take a dose as soon as you remember. If it is almost time for your next dose, wait until then and take a regular dose. Do not take extra medicine to make up for a missed dose. · Store the medicine in a closed container at room temperature, away from heat, moisture, and direct light. Drugs and Foods to Avoid:   Ask your doctor or pharmacist before using any other medicine, including over-the-counter medicines, vitamins, and herbal products. · Do not use this medicine together with aliskiren if you have diabetes. · Some foods and medicines may affect how lisinopril works.  Tell your doctor if you are using any of the following:   ¨ Aliskiren, everolimus, lithium, sirolimus, temsirolimus  ¨ Another blood pressure medicine, including an angiotensin receptor blocker (ARB)  ¨ Diuretic (water pill, including amiloride, spironolactone, triamterene)  ¨ Insulin or diabetes medicine  ¨ NSAID pain or arthritis medicine (including aspirin, celecoxib, diclofenac, ibuprofen, naproxen)  · Ask your doctor before you use any medicine, supplement, or salt substitute that contains potassium. Warnings While Using This Medicine:   · It is not safe to take this medicine during pregnancy. It could harm an unborn baby. Tell your doctor right away if you become pregnant. · Tell your doctor if you are breastfeeding, or if you have kidney disease, liver disease, diabetes, or heart or blood vessel disease. · This medicine may cause the following problems:  ¨ Angioedema (severe swelling)  ¨ Kidney problems  ¨ Serious liver problems  · This medicine could lower your blood pressure too much, especially when you first use it or if you are dehydrated. Stand or sit up slowly if you feel lightheaded or dizzy. · Do not stop using this medicine without asking your doctor, even if you feel well. This medicine will not cure your high blood pressure, but it will help keep it in a normal range. You may have to take blood pressure medicine for the rest of your life. · Tell any doctor or dentist who treats you that you are using this medicine. · Your doctor will do lab tests at regular visits to check on the effects of this medicine. Keep all appointments. · Keep all medicine out of the reach of children. Never share your medicine with anyone.   Possible Side Effects While Using This Medicine:   Call your doctor right away if you notice any of these side effects:  · Allergic reaction: Itching or hives, swelling in your face or hands, swelling or tingling in your mouth or throat, chest tightness, trouble breathing  · Blistering, peeling, or red skin rash  · Change in how much or how often you urinate  · Confusion, weakness, uneven heartbeat, trouble breathing, numbness or tingling in your hands, feet, or lips  · Dark urine or pale stools, nausea, vomiting, loss of appetite, stomach pain, yellow skin or eyes  · Fever, chills, sore throat, body aches  · Lightheadedness, dizziness, fainting  · Severe stomach pain (with or without nausea or vomiting)  If you notice these less serious side effects, talk with your doctor:   · Dry cough  If you notice other side effects that you think are caused by this medicine, tell your doctor. Call your doctor for medical advice about side effects. You may report side effects to FDA at 0-768-FDA-4084  © 2017 [address] Information is for End User's use only and may not be sold, redistributed or otherwise used for commercial purposes. The above information is an  only. It is not intended as medical advice for individual conditions or treatments. Talk to your doctor, nurse or pharmacist before following any medical regimen to see if it is safe and effective for you. Pantoprazole (By mouth)   Pantoprazole (pan-TOE-pra-zole)  Treats gastroesophageal reflux disease (GERD), a damaged esophagus, and high levels of stomach acid. This medicine is a proton pump inhibitor (PPI). Brand Name(s): Protonix   There may be other brand names for this medicine. When This Medicine Should Not Be Used: This medicine is not right for everyone. Do not use it if you had an allergic reaction to pantoprazole or similar medicines. How to Use This Medicine:   Packet, Tablet, Delayed Release Tablet, Long Acting Tablet  · Your doctor will tell you how much medicine to use. Do not use more than directed. Take the medicine at least 30 minutes before a meal.  · Delayed-release tablet: Swallow the tablet whole. Do not crush, break, or chew it. · Delayed-release packet:   ¨ To prepare with applesauce:   § Mix the packet contents with 1 teaspoon of applesauce. Do not mix with water, or other liquids or food. Do not divide the packet contents to make smaller doses. § Swallow the mixture within 10 minutes after you mix it. Do not chew or crush the granules. § Sip some water after you take the mixture to make sure you swallow all of the medicine.   ¨ To prepare with apple juice:   § Mix the packet contents with 1 teaspoon of apple juice in a small cup. Do not divide the packet contents to make smaller doses. § Stir for 5 seconds and drink the mixture immediately. Do not chew or crush the granules. § To make sure you get all of the medicine, add more apple juice to the cup. Drink it immediately. ¨ To prepare for a feeding tube:   § Pour the packet contents in a 2-ounce (60 milliliter [mL]) catheter-tip syringe. § Add 10 mL of apple juice to the syringe. Add the mixture to the tube. Gently tap or shake the barrel of the syringe to help empty it. § Add 10 mL of apple juice to the syringe and put it in the tube. Do this at least 2 times. There should be no granules left in the syringe. · This medicine should come with a Medication Guide. Ask your pharmacist for a copy if you do not have one. · Missed dose: Take a dose as soon as you remember. If it is almost time for your next dose, wait until then and take a regular dose. Do not take extra medicine to make up for a missed dose. · Store the medicine in a closed container at room temperature, away from heat, moisture, and direct light. Drugs and Foods to Avoid:   Ask your doctor or pharmacist before using any other medicine, including over-the-counter medicines, vitamins, and herbal products. · Some foods and medicines can affect how pantoprazole works. Tell your doctor if you are using any of the following:   ¨ Ampicillin, atazanavir, digoxin, erlotinib, ketoconazole, methotrexate, mycophenolate mofetil, nelfinavir  ¨ Blood thinner (including warfarin)  ¨ Diuretic (water pill)  ¨ Iron supplements  Warnings While Using This Medicine:   · Tell your doctor if you are pregnant or breastfeeding, or if you have liver disease, lupus, or osteoporosis. · This medicine may cause the following problems:  ¨ Kidney problems  ¨ Low vitamin B12 or magnesium levels  ¨ Increased risk of broken bones in the hip, wrist, or spine  · This medicine can cause diarrhea.  Call your doctor if the diarrhea becomes severe, does not stop, or is bloody. Do not take any medicine to stop diarrhea until you have talked to your doctor. Diarrhea can occur 2 months or more after you stop taking this medicine. · Tell any doctor or dentist who treats you that you are using this medicine. This medicine may affect certain medical test results. · Your doctor will check your progress and the effects of this medicine at regular visits. Keep all appointments. · Keep all medicine out of the reach of children. Never share your medicine with anyone. Possible Side Effects While Using This Medicine:   Call your doctor right away if you notice any of these side effects:  · Allergic reaction: Itching or hives, swelling in your face or hands, swelling or tingling in your mouth or throat, chest tightness, trouble breathing  · Blistering, peeling, red skin rash  · Fever, joint pain, swelling in your body, unusual weight gain, change in how much or how often you urinate  · Joint pain, rash on your cheeks or arms that gets worse in the sun  · Seizures, dizziness, uneven heartbeat, muscle cramps or twitching  · Severe diarrhea, stomach cramps, fever  · Stomach pain, nausea, vomiting, weight loss  If you notice other side effects that you think are caused by this medicine, tell your doctor. Call your doctor for medical advice about side effects. You may report side effects to FDA at 0-712-FDA-8225  © 2017 [facility] Information is for End User's use only and may not be sold, redistributed or otherwise used for commercial purposes. The above information is an  only. It is not intended as medical advice for individual conditions or treatments. Talk to your doctor, nurse or pharmacist before following any medical regimen to see if it is safe and effective for you.        DISCHARGE SUMMARY from Nurse    PATIENT INSTRUCTIONS:    After general anesthesia or intravenous sedation, for 24 hours or while taking prescription Narcotics:  · Limit your activities  · Do not drive and operate hazardous machinery  · Do not make important personal or business decisions  · Do  not drink alcoholic beverages  · If you have not urinated within 8 hours after discharge, please contact your surgeon on call. Report the following to your surgeon:  · Excessive pain, swelling, redness or odor of or around the surgical area  · Temperature over 100.5  · Nausea and vomiting lasting longer than 4 hours or if unable to take medications  · Any signs of decreased circulation or nerve impairment to extremity: change in color, persistent  numbness, tingling, coldness or increase pain  · Any questions    What to do at Home:  Recommended activity: Activity as tolerated and no driving for today. If you experience any of the following symptoms chest pain, shortness of breath please follow up with PCP. *  Please give a list of your current medications to your Primary Care Provider. *  Please update this list whenever your medications are discontinued, doses are      changed, or new medications (including over-the-counter products) are added. *  Please carry medication information at all times in case of emergency situations. These are general instructions for a healthy lifestyle:    No smoking/ No tobacco products/ Avoid exposure to second hand smoke  Surgeon General's Warning:  Quitting smoking now greatly reduces serious risk to your health. Obesity, smoking, and sedentary lifestyle greatly increases your risk for illness    A healthy diet, regular physical exercise & weight monitoring are important for maintaining a healthy lifestyle    You may be retaining fluid if you have a history of heart failure or if you experience any of the following symptoms:  Weight gain of 3 pounds or more overnight or 5 pounds in a week, increased swelling in our hands or feet or shortness of breath while lying flat in bed.   Please call your doctor as soon as you notice any of these symptoms; do not wait until your next office visit. Recognize signs and symptoms of STROKE:    F-face looks uneven    A-arms unable to move or move unevenly    S-speech slurred or non-existent    T-time-call 911 as soon as signs and symptoms begin-DO NOT go       Back to bed or wait to see if you get better-TIME IS BRAIN. Warning Signs of HEART ATTACK     Call 911 if you have these symptoms:   Chest discomfort. Most heart attacks involve discomfort in the center of the chest that lasts more than a few minutes, or that goes away and comes back. It can feel like uncomfortable pressure, squeezing, fullness, or pain.  Discomfort in other areas of the upper body. Symptoms can include pain or discomfort in one or both arms, the back, neck, jaw, or stomach.  Shortness of breath with or without chest discomfort.  Other signs may include breaking out in a cold sweat, nausea, or lightheadedness. Don't wait more than five minutes to call 911 - MINUTES MATTER! Fast action can save your life. Calling 911 is almost always the fastest way to get lifesaving treatment. Emergency Medical Services staff can begin treatment when they arrive -- up to an hour sooner than if someone gets to the hospital by car. The discharge information has been reviewed with the patient. The patient verbalized understanding. Discharge medications reviewed with the patient and appropriate educational materials and side effects teaching were provided.   ___________________________________________________________________________________________________________________________________

## 2019-05-21 NOTE — DISCHARGE SUMMARY
Physician Discharge Summary     Patient ID:  Jr Vitale  [id number]  89 y.o.  1960    Admit date: 5/19/2019    Discharge date and time:5/21/19    Admitting Physician: Kim Tucker DO     Primary Cardiologist:Dr. Heath Lam     Primary Care MD:April Quijano MD    Discharge Physician: Braxton Doshi NP    Admission Diagnoses: Unstable angina [facility]) [I20.0]    Discharge Diagnoses:   Patient Active Problem List    Diagnosis Date Noted    Unstable angina ([address] 05/19/2019    Leg pain 07/28/2017    SOB (shortness of breath) 07/24/2017    Dyslipidemia 07/24/2017    Localized edema 07/24/2017    Encounter to establish care 07/03/2017    S/P PTCA (percutaneous transluminal coronary angioplasty) 07/03/2017    Coronary artery disease due to lipid rich plaque 07/03/2017    Mixed hyperlipidemia 07/03/2017    Chest pain 07/03/2017           Hospital Course:HPI:  Jr Vitale is a 63 y.o. female with PMH of CAD ([facility] 2017 -- patent stent LAD, ostial LAD--FFR 0.88), DSL, and HTN, who presented to the ED at [facility] with complaint of chest pain. The pain started Saturday and has been intermittent. The pain is left sided with radiation to the back and associated shortness of breath and nausea. The pain is described as a sharp pressure. The episodes lasted from 10 minutes to 30 minutes. Labs showed WBC 6.8, H7h 11.4/34.1, plt 251, Na 143, K 3.3, BUN 14, creatinine 0.84, trop 0.01 and BNP 16. CT of chest negative for acute findings. She was transferred to [facility] for further treatment. On arrival she complains of mild chest pressure. Heparin drip continued. ------------------------------------------------------------------------------------------------    Pt was admitted for further evaluation of her chest pain. Pt underwent cardiac cath noting stable CAD. GI evaluation with EGD noting mild gastritis and colonoscopy that was normal . Recommended PPI therapy.  She was started on imdur and low dose lisinopril and aspirin was decreased to 81 mg daily. Pt not currently on statin therapy. Will defer to Dr. Jamia Antonio: The patient is being discharged to home on a low saturated fat, low cholesterol diet. Pt is instructed to abstain from any heavy lifting, straining, stooping or driving for 5 days. Pt is instructed to watch groin site ( if groin access was performed) for bleeding/oozing. If so,pt is instructed to apply firm pressure with clean cloth and call office at [phone]. Pt is instructed to watch for signs of infection which include increasing area of redness around site, fever/hot to touch or purulent drainage. Pt is instructed not to soak in a tub bath for 1 week, but it is okay to shower. Discharge Exam:     Visit Vitals  /62   Pulse 81   Temp 98.2 °F (36.8 °C)   Resp 16   Wt 76.3 kg (168 lb 4.8 oz)   SpO2 98%   BMI 28.89 kg/m²     General Appearance:  Well developed, well nourished,alert and oriented x 3, and individual in no acute distress. Ears/Nose/Mouth/Throat:   Hearing grossly normal.         Neck: Supple. Chest:   Lungs clear to auscultation bilaterally. Cardiovascular:  Regular rate and rhythm, S1, S2 normal, no murmur. Abdomen:   Soft, non-tender, bowel sounds are active. Extremities: No edema bilaterally. Skin: Warm and dry.                Final Laboratory Data:  Recent Results (from the past 24 hour(s))   OCCULT BLOOD, STOOL    Collection Time: 05/20/19  7:34 PM   Result Value Ref Range    Occult blood, stool NEGATIVE  NEG     METABOLIC PANEL, BASIC    Collection Time: 05/21/19  4:44 AM   Result Value Ref Range    Sodium 142 136 - 145 mmol/L    Potassium 3.5 3.5 - 5.1 mmol/L    Chloride 109 (H) 98 - 107 mmol/L    CO2 25 21 - 32 mmol/L    Anion gap 8 7 - 16 mmol/L    Glucose 127 (H) 65 - 100 mg/dL    BUN 8 6 - 23 MG/DL    Creatinine 0.75 0.6 - 1.0 MG/DL    GFR est AA >60 >60 ml/min/1.73m2    GFR est non-AA >60 >60 ml/min/1.73m2 Calcium 9.1 8.3 - 10.4 MG/DL   CBC W/O DIFF    Collection Time: 05/21/19  4:44 AM   Result Value Ref Range    WBC 8.3 4.3 - 11.1 K/uL    RBC 3.97 (L) 4.05 - 5.2 M/uL    HGB 11.1 (L) 11.7 - 15.4 g/dL    HCT 33.5 (L) 35.8 - 46.3 %    MCV 84.4 79.6 - 97.8 FL    MCH 28.0 26.1 - 32.9 PG    MCHC 33.1 31.4 - 35.0 g/dL    RDW 14.7 (H) 11.9 - 14.6 %    PLATELET 029 509 - 324 K/uL    MPV 9.1 (L) 9.4 - 12.3 FL    ABSOLUTE NRBC 0.00 0.0 - 0.2 K/uL   MAGNESIUM    Collection Time: 05/21/19  4:44 AM   Result Value Ref Range    Magnesium 2.1 1.8 - 2.4 mg/dL       Disposition: home    Patient Instructions:   Current Discharge Medication List      START taking these medications    Details   aspirin 81 mg chewable tablet Take 1 Tab by mouth daily. Qty: 30 Tab, Refills: 11      isosorbide mononitrate ER (IMDUR) 30 mg tablet Take 1 Tab by mouth daily. Qty: 30 Tab, Refills: 6      lisinopril (PRINIVIL, ZESTRIL) 2.5 mg tablet Take 1 Tab by mouth daily. Qty: 30 Tab, Refills: 6         CONTINUE these medications which have CHANGED    Details   pantoprazole (PROTONIX) 40 mg tablet Take 1 Tab by mouth daily. Qty: 30 Tab, Refills: 6         CONTINUE these medications which have NOT CHANGED    Details   DULoxetine (CYMBALTA) 60 mg capsule Take 60 mg by mouth daily. Indications: Anxiousness associated with Depression      trazodone HCl (DESYREL PO) Take 250 mg by mouth nightly. Indications: Pain      nitroglycerin (NITROSTAT) 0.4 mg SL tablet 1 Tab by SubLINGual route every five (5) minutes as needed for Chest Pain. Qty: 30 Tab, Refills: 5      ondansetron (ZOFRAN ODT) 8 mg disintegrating tablet Take 1 Tab by mouth every eight (8) hours as needed for Nausea.   Qty: 12 Tab, Refills: 0         STOP taking these medications       aspirin (ASPIRIN) 325 mg tablet Comments:   Reason for Stopping:         cyclobenzaprine (FLEXERIL) 10 mg tablet Comments:   Reason for Stopping:               Referenced discharge instructions provided by nursing for diet and activity. Follow-up:  Primary Cardiologist:Dr. Santana Casiano  in 2 weeks  PCP: (Patti Fields MD) in about 4 weeks.     Signed:  Justa Guzman NP  5/21/2019  4:48 PM    Tristan Rodríguez MD

## 2019-05-21 NOTE — PROGRESS NOTES
TRANSFER - OUT REPORT:    Verbal report given to MERLIN Rouse on Felisa Latif being transferred to GI lab for ordered procedure       Report consisted of patients Situation, Background, Assessment and Recommendations (SBAR). Information from the following report(s) SBAR and MAR was reviewed with the receiving nurse. Opportunity for questions and clarification was provided. Patient awaiting transport to GI lab.

## 2019-05-21 NOTE — PROGRESS NOTES
Care Management Interventions  PCP Verified by CM: Yes(last seen May 2019)  Palliative Care Criteria Met (RRAT>21 & CHF Dx)?: No(RRAT 3 Dx Unstable angina )  Mode of Transport at Discharge: Other (see comment)(friend)  Transition of Care Consult (CM Consult): Discharge Planning  Discharge Durable Medical Equipment: No(none)  Physical Therapy Consult: No  Occupational Therapy Consult: No  Speech Therapy Consult: No  Current Support Network: Lives Alone  Confirm Follow Up Transport: Self  Plan discussed with Pt/Family/Caregiver: Yes  Freedom of Choice Offered: Yes  Discharge Location  Discharge Placement: Home  Patient ready for d/c. Patient d/c home no needs voiced.

## 2019-05-21 NOTE — PROGRESS NOTES
[address] May 21, 2019       RE: Sandra Caruso      To Whom It May Concern,    This is to certify that Sandra Caruso may return to work on Friday 5/24/2019 with no restrictions. Please feel free to contact my office if you have any questions or concerns. Thank you for your assistance in this matter.       Sincerely,  Nicole Carlos RN

## 2019-05-21 NOTE — PROGRESS NOTES
Discharge instructions reviewed with patient. Prescriptions given for imdur, protonix, lisinopril and med info sheets provided for all new medications. Opportunity for questions provided. Patient voiced understanding of all discharge instructions. Patient's IVs removed, telemetry monitor returned to monitor room.

## 2019-05-21 NOTE — INTERVAL H&P NOTE
H&P Update: 
Doris Sandra was seen and examined. History and physical has been reviewed. The patient has been examined. There have been no significant clinical changes since the completion of the originally dated History and Physical. 
 
Endoscopy and risks explained to the patient. Risks including reaction to sedation, cardiopulmonary events, infection, bleeding, perforation, requirement for surgery if complications should occur, death were explained to patient who expressed understanding and agreed to proceed with endoscopy. Michael Rodriguez MD  
[facility]

## 2019-05-21 NOTE — PROCEDURES
[facility]  CARDIAC CATH    Name:  Timothy Valverde  MR#:  [id number]  :  1960  ACCOUNT #:  [de-identified]  DATE OF SERVICE:  2019      PRIMARY CARE PHYSICIAN:  Elvira Black MD    PRIMARY CARDIOLOGIST:  Jorge Forbes MD    PROCEDURES PERFORMED:  Left heart catheterization with coronary angiography and left ventriculogram.    PREOPERATIVE DIAGNOSES:  Recurrent substernal chest pain, worrisome for accelerating angina. POSTOPERATIVE DIAGNOSES:  Noncardiac chest pain, potentially gastric mediated. SURGEON:  Barry Coon MD    ASSISTANT:  None. ESTIMATED BLOOD LOSS:  Less than 3 mL. SPECIMENS REMOVED:  None. COMPLICATIONS:  None. IMPLANTS:  None. ANESTHESIA:  The patient was sedated by Marvel Burton with a frailty score of 3 using 2 mg Versed and 50 mcg fentanyl, monitored from 11:10 a.m. to 11:36 a.m. without complication. PROCEDURE TECHNIQUE:  After informed consent was obtained, the patient was brought to the cath lab, prepped and draped in the usual fashion. A 6-[place] Islander sheath was placed in the right radial artery via the micropuncture modified Seldinger technique and left heart catheterization performed using standard 5-[place] Islander angled pigtail and Tiger catheters. Manual pressure will be applied to the patient's access site via TR band protocol. There were no complications. Pressure results: Aorta 120/73. Left ventricle 120/15-20. Left ventriculogram reveals normal left ventricular regional wall motion with ejection fraction greater than 60%. There is no mitral regurgitation and no aortic valve gradient on catheter pullback. Left ventricular end-diastolic pressure is elevated. Coronary anatomy:  Left main has mild irregularities dividing into an LAD and circumflex in the usual fashion. The LAD has a stent in the proximal vessel.   The stent is patent and there is 30-40% disease straddling the proximal and distal margins of the stent, but this appears to be non-flow limiting and actually improved angiographically, compared to 2015 imaging at the time of the negative pressure wire interrogation. The LAD essentially trifurcates early into two diagonal branches and the continuing LAD. There is calcium at the trifurcation with ostial 30-40% narrowing, but nothing flow-limiting or critical in any of the branches noted. The mid to distal LAD has minimal irregularities. Both diagonal branches have minimal irregularities beyond the ostium. The circumflex is a moderate caliber nondominant system which supplies a single obtuse marginal branch. The entire circumflex has mild luminal irregularities. The right coronary is a dominant vessel which has mild calcification in the mid vessel. There is an irregular 30-40% lesion which does not appear to be flow limiting in multiple views. The remainder of the right coronary proper has mild luminal irregularity. A large bifurcating posterolateral system has minimal disease. The ostium of a small-caliber posterior descending branch has a 40-50% napkin ring narrowing, but this appears to be non-flow limiting and unchanged compared to imaging from 2015 and will be left to medical therapy given the 2-mm size of the posterior descending branch. CONCLUSIONS:  1.  Stable coronary disease as described above with patent LAD stent. 2.  Multiple moderate lesions which do not appear to be flow limiting and appear angiographically somewhat improved compared to prior imaging, consider coronary vasospasm. 3.  Normal left ventricular regional wall motion and ejection fraction. 4.  The patient had negative cardiac enzymes on admission, but had persistent chest tightness all night. She has chronic pain issues as well and admits to taking 800 mg of ibuprofen two and three times daily for the past several weeks.   It is likely her persistent discomfort even this morning is due to gastric mediated symptoms. Gastroenterology was consulted.       Nette Garland MD      [id number]  D:  05/20/2019 11:45  T:  05/20/2019 11:49  JOB #:  [id number]  CC:  [facility]       Senia Woodruff MD       [facility]

## 2019-05-21 NOTE — PROGRESS NOTES
Bedside and Verbal shift change report given to [facility], [facility] (oncoming nurse) by self (offgoing nurse). Report included the following information SBAR, Kardex, MAR and Recent Results.

## 2019-07-23 PROBLEM — I10 ESSENTIAL HYPERTENSION WITH GOAL BLOOD PRESSURE LESS THAN 130/85: Status: ACTIVE | Noted: 2019-07-23

## 2019-12-19 ENCOUNTER — HOSPITAL ENCOUNTER (OUTPATIENT)
Dept: SURGERY | Age: 59
Discharge: HOME | End: 2019-12-19
Payer: COMMERCIAL

## 2019-12-19 VITALS
SYSTOLIC BLOOD PRESSURE: 134 MMHG | DIASTOLIC BLOOD PRESSURE: 82 MMHG | OXYGEN SATURATION: 94 % | RESPIRATION RATE: 16 BRPM | HEART RATE: 80 BPM | TEMPERATURE: 98.1 F | WEIGHT: 181.13 LBS | HEIGHT: 64 IN | BODY MASS INDEX: 30.92 KG/M2

## 2019-12-19 LAB — HGB BLD-MCNC: 12.3 G/DL (ref 11.7–15.4)

## 2019-12-19 PROCEDURE — 85018 HEMOGLOBIN: CPT

## 2019-12-19 RX ORDER — ACETAMINOPHEN 500 MG
1000 TABLET ORAL
COMMUNITY
End: 2021-06-21

## 2019-12-19 RX ORDER — DULOXETIN HYDROCHLORIDE 30 MG/1
30 CAPSULE, DELAYED RELEASE ORAL
COMMUNITY

## 2019-12-19 RX ORDER — IBUPROFEN 200 MG
800 TABLET ORAL
COMMUNITY
End: 2021-07-14

## 2019-12-19 NOTE — PERIOP NOTES
Patient verified name and     Order for consent :Order for consent NOT found in EHR at time of PAT visit. Unable to verify case posting against order; surgery verified by patient. found in EHR and matches case posting; patient verified. Type lb surgery,  assessment complete. Labs per surgeon: None, no orders in connect care;   Labs per anesthesia protocol: HGB;   EK2019 SR, Has 1 stent placed in , takes ASA 81 mg. Last heart cath in 2019 no stents, EF >60%. Awaiting cardiac clearance to have surgery from Dr. Octavio Crawford. [facility] approved surgical skin cleanser and instructions given per hospital policy. Patient provided with and instructed on educational handouts including Guide to Surgery, Pain Management, Hand Hygiene, Blood Transfusion Education, and [place] [facility]esthesia Brochure. Patient answered medical/surgical history questions at their best of ability. All prior to admission medications documented in [facility]. Original medication prescription bottle were visualized during patient appointment. Patient instructed to hold all vitamins 7 days prior to surgery and NSAIDS 5 days prior to surgery, patient verbalized understanding. Patient teach back successful and patient demonstrates knowledge of instructions.

## 2019-12-23 ENCOUNTER — ANESTHESIA EVENT (OUTPATIENT)
Dept: SURGERY | Age: 59
End: 2019-12-23
Payer: COMMERCIAL

## 2019-12-26 ENCOUNTER — HOSPITAL ENCOUNTER (OUTPATIENT)
Age: 59
Setting detail: OUTPATIENT SURGERY
Discharge: HOME | End: 2019-12-26
Attending: ORTHOPAEDIC SURGERY | Admitting: ORTHOPAEDIC SURGERY
Payer: COMMERCIAL

## 2019-12-26 ENCOUNTER — ANESTHESIA (OUTPATIENT)
Dept: SURGERY | Age: 59
End: 2019-12-26
Payer: COMMERCIAL

## 2019-12-26 ENCOUNTER — APPOINTMENT (OUTPATIENT)
Dept: GENERAL RADIOLOGY | Age: 59
End: 2019-12-26
Attending: ORTHOPAEDIC SURGERY
Payer: COMMERCIAL

## 2019-12-26 VITALS
DIASTOLIC BLOOD PRESSURE: 78 MMHG | OXYGEN SATURATION: 99 % | HEART RATE: 98 BPM | WEIGHT: 181 LBS | SYSTOLIC BLOOD PRESSURE: 133 MMHG | RESPIRATION RATE: 15 BRPM | TEMPERATURE: 97.8 F | BODY MASS INDEX: 31.07 KG/M2

## 2019-12-26 PROCEDURE — 77030035075 HC SPLNT HAMRTOE SYNT -C: Performed by: ORTHOPAEDIC SURGERY

## 2019-12-26 PROCEDURE — 77030035082: Performed by: ORTHOPAEDIC SURGERY

## 2019-12-26 PROCEDURE — 77030003602 HC NDL NRV BLK BBMI -B: Performed by: ANESTHESIOLOGY

## 2019-12-26 PROCEDURE — 76060000032 HC ANESTHESIA 0.5 TO 1 HR: Performed by: ORTHOPAEDIC SURGERY

## 2019-12-26 PROCEDURE — 77030002916 HC SUT ETHLN J&J -A: Performed by: ORTHOPAEDIC SURGERY

## 2019-12-26 PROCEDURE — 76942 ECHO GUIDE FOR BIOPSY: CPT | Performed by: ORTHOPAEDIC SURGERY

## 2019-12-26 PROCEDURE — 77030010509 HC AIRWY LMA MSK TELE -A: Performed by: ANESTHESIOLOGY

## 2019-12-26 PROCEDURE — C1713 ANCHOR/SCREW BN/BN,TIS/BN: HCPCS | Performed by: ORTHOPAEDIC SURGERY

## 2019-12-26 PROCEDURE — 76210000020 HC REC RM PH II FIRST 0.5 HR: Performed by: ORTHOPAEDIC SURGERY

## 2019-12-26 PROCEDURE — 74011250637 HC RX REV CODE- 250/637: Performed by: ANESTHESIOLOGY

## 2019-12-26 PROCEDURE — 77030002933 HC SUT MCRYL J&J -A: Performed by: ORTHOPAEDIC SURGERY

## 2019-12-26 PROCEDURE — 74011000250 HC RX REV CODE- 250: Performed by: NURSE ANESTHETIST, CERTIFIED REGISTERED

## 2019-12-26 PROCEDURE — 76210000063 HC OR PH I REC FIRST 0.5 HR: Performed by: ORTHOPAEDIC SURGERY

## 2019-12-26 PROCEDURE — 76010010054 HC POST OP PAIN BLOCK: Performed by: ORTHOPAEDIC SURGERY

## 2019-12-26 PROCEDURE — 74011250636 HC RX REV CODE- 250/636: Performed by: ANESTHESIOLOGY

## 2019-12-26 PROCEDURE — 74011250636 HC RX REV CODE- 250/636: Performed by: ORTHOPAEDIC SURGERY

## 2019-12-26 PROCEDURE — 77030000032 HC CUF TRNQT ZIMM -B: Performed by: ORTHOPAEDIC SURGERY

## 2019-12-26 PROCEDURE — 74011250636 HC RX REV CODE- 250/636: Performed by: NURSE ANESTHETIST, CERTIFIED REGISTERED

## 2019-12-26 PROCEDURE — 77030006788 HC BLD SAW OSC STRY -B: Performed by: ORTHOPAEDIC SURGERY

## 2019-12-26 PROCEDURE — 77030018836 HC SOL IRR NACL ICUM -A: Performed by: ORTHOPAEDIC SURGERY

## 2019-12-26 PROCEDURE — 76010000160 HC OR TIME 0.5 TO 1 HR INTENSV-TIER 1: Performed by: ORTHOPAEDIC SURGERY

## 2019-12-26 DEVICE — KIT STPL BNE FIX BRDG L20MM SPEEDTITAN BME: Type: IMPLANTABLE DEVICE | Site: FOOT | Status: FUNCTIONAL

## 2019-12-26 RX ORDER — ALBUTEROL SULFATE 0.83 MG/ML
2.5 SOLUTION RESPIRATORY (INHALATION) AS NEEDED
Status: DISCONTINUED | OUTPATIENT
Start: 2019-12-26 | End: 2019-12-26 | Stop reason: HOSPADM

## 2019-12-26 RX ORDER — LIDOCAINE HYDROCHLORIDE 10 MG/ML
0.1 INJECTION INFILTRATION; PERINEURAL AS NEEDED
Status: DISCONTINUED | OUTPATIENT
Start: 2019-12-26 | End: 2019-12-26 | Stop reason: HOSPADM

## 2019-12-26 RX ORDER — SODIUM CHLORIDE 0.9 % (FLUSH) 0.9 %
5-40 SYRINGE (ML) INJECTION EVERY 8 HOURS
Status: DISCONTINUED | OUTPATIENT
Start: 2019-12-26 | End: 2019-12-26 | Stop reason: HOSPADM

## 2019-12-26 RX ORDER — MIDAZOLAM HYDROCHLORIDE 1 MG/ML
2 INJECTION, SOLUTION INTRAMUSCULAR; INTRAVENOUS
Status: DISCONTINUED | OUTPATIENT
Start: 2019-12-26 | End: 2019-12-26 | Stop reason: HOSPADM

## 2019-12-26 RX ORDER — MIDAZOLAM HYDROCHLORIDE 1 MG/ML
2 INJECTION, SOLUTION INTRAMUSCULAR; INTRAVENOUS ONCE
Status: COMPLETED | OUTPATIENT
Start: 2019-12-26 | End: 2019-12-26

## 2019-12-26 RX ORDER — HYDROMORPHONE HYDROCHLORIDE 2 MG/ML
0.5 INJECTION, SOLUTION INTRAMUSCULAR; INTRAVENOUS; SUBCUTANEOUS
Status: DISCONTINUED | OUTPATIENT
Start: 2019-12-26 | End: 2019-12-26 | Stop reason: HOSPADM

## 2019-12-26 RX ORDER — GUAIFENESIN 100 MG/5ML
81 LIQUID (ML) ORAL DAILY
Status: DISCONTINUED | OUTPATIENT
Start: 2019-12-26 | End: 2019-12-26

## 2019-12-26 RX ORDER — SODIUM CHLORIDE, SODIUM LACTATE, POTASSIUM CHLORIDE, CALCIUM CHLORIDE 600; 310; 30; 20 MG/100ML; MG/100ML; MG/100ML; MG/100ML
75 INJECTION, SOLUTION INTRAVENOUS CONTINUOUS
Status: DISCONTINUED | OUTPATIENT
Start: 2019-12-26 | End: 2019-12-26 | Stop reason: HOSPADM

## 2019-12-26 RX ORDER — CEFAZOLIN SODIUM/WATER 2 G/20 ML
2 SYRINGE (ML) INTRAVENOUS ONCE
Status: COMPLETED | OUTPATIENT
Start: 2019-12-26 | End: 2019-12-26

## 2019-12-26 RX ORDER — OXYCODONE HYDROCHLORIDE 5 MG/1
5 TABLET ORAL
Status: DISCONTINUED | OUTPATIENT
Start: 2019-12-26 | End: 2019-12-26 | Stop reason: HOSPADM

## 2019-12-26 RX ORDER — PROPOFOL 10 MG/ML
INJECTION, EMULSION INTRAVENOUS AS NEEDED
Status: DISCONTINUED | OUTPATIENT
Start: 2019-12-26 | End: 2019-12-26 | Stop reason: HOSPADM

## 2019-12-26 RX ORDER — SODIUM CHLORIDE 0.9 % (FLUSH) 0.9 %
5-40 SYRINGE (ML) INJECTION AS NEEDED
Status: DISCONTINUED | OUTPATIENT
Start: 2019-12-26 | End: 2019-12-26 | Stop reason: HOSPADM

## 2019-12-26 RX ORDER — FENTANYL CITRATE 50 UG/ML
100 INJECTION, SOLUTION INTRAMUSCULAR; INTRAVENOUS ONCE
Status: COMPLETED | OUTPATIENT
Start: 2019-12-26 | End: 2019-12-26

## 2019-12-26 RX ORDER — LIDOCAINE HYDROCHLORIDE 20 MG/ML
INJECTION, SOLUTION EPIDURAL; INFILTRATION; INTRACAUDAL; PERINEURAL AS NEEDED
Status: DISCONTINUED | OUTPATIENT
Start: 2019-12-26 | End: 2019-12-26 | Stop reason: HOSPADM

## 2019-12-26 RX ORDER — SODIUM CHLORIDE, SODIUM LACTATE, POTASSIUM CHLORIDE, CALCIUM CHLORIDE 600; 310; 30; 20 MG/100ML; MG/100ML; MG/100ML; MG/100ML
50 INJECTION, SOLUTION INTRAVENOUS CONTINUOUS
Status: DISCONTINUED | OUTPATIENT
Start: 2019-12-26 | End: 2019-12-26 | Stop reason: HOSPADM

## 2019-12-26 RX ORDER — DEXAMETHASONE SODIUM PHOSPHATE 4 MG/ML
INJECTION, SOLUTION INTRA-ARTICULAR; INTRALESIONAL; INTRAMUSCULAR; INTRAVENOUS; SOFT TISSUE AS NEEDED
Status: DISCONTINUED | OUTPATIENT
Start: 2019-12-26 | End: 2019-12-26 | Stop reason: HOSPADM

## 2019-12-26 RX ORDER — ONDANSETRON 2 MG/ML
INJECTION INTRAMUSCULAR; INTRAVENOUS AS NEEDED
Status: DISCONTINUED | OUTPATIENT
Start: 2019-12-26 | End: 2019-12-26 | Stop reason: HOSPADM

## 2019-12-26 RX ORDER — PROPOFOL 10 MG/ML
INJECTION, EMULSION INTRAVENOUS
Status: DISCONTINUED | OUTPATIENT
Start: 2019-12-26 | End: 2019-12-26 | Stop reason: HOSPADM

## 2019-12-26 RX ORDER — CELECOXIB 200 MG/1
200 CAPSULE ORAL
Status: DISCONTINUED | OUTPATIENT
Start: 2019-12-26 | End: 2019-12-26 | Stop reason: HOSPADM

## 2019-12-26 RX ORDER — GUAIFENESIN 100 MG/5ML
81 LIQUID (ML) ORAL
Status: COMPLETED | OUTPATIENT
Start: 2019-12-26 | End: 2019-12-26

## 2019-12-26 RX ADMIN — MEPIVACAINE HYDROCHLORIDE 8 ML: 20 INJECTION, SOLUTION EPIDURAL; INFILTRATION at 06:30

## 2019-12-26 RX ADMIN — SODIUM CHLORIDE, SODIUM LACTATE, POTASSIUM CHLORIDE, AND CALCIUM CHLORIDE 75 ML/HR: 600; 310; 30; 20 INJECTION, SOLUTION INTRAVENOUS at 06:00

## 2019-12-26 RX ADMIN — PROPOFOL 50 MG: 10 INJECTION, EMULSION INTRAVENOUS at 07:21

## 2019-12-26 RX ADMIN — FENTANYL CITRATE 50 MCG: 50 INJECTION, SOLUTION INTRAMUSCULAR; INTRAVENOUS at 06:22

## 2019-12-26 RX ADMIN — ASPIRIN 81 MG 81 MG: 81 TABLET ORAL at 06:20

## 2019-12-26 RX ADMIN — ONDANSETRON 4 MG: 2 INJECTION INTRAMUSCULAR; INTRAVENOUS at 07:26

## 2019-12-26 RX ADMIN — MIDAZOLAM 2 MG: 1 INJECTION INTRAMUSCULAR; INTRAVENOUS at 06:22

## 2019-12-26 RX ADMIN — PROPOFOL 30 MG: 10 INJECTION, EMULSION INTRAVENOUS at 07:16

## 2019-12-26 RX ADMIN — ROPIVACAINE HYDROCHLORIDE 8 ML: 5 INJECTION, SOLUTION EPIDURAL; INFILTRATION; PERINEURAL at 06:30

## 2019-12-26 RX ADMIN — LIDOCAINE HYDROCHLORIDE 40 MG: 20 INJECTION, SOLUTION EPIDURAL; INFILTRATION; INTRACAUDAL; PERINEURAL at 07:15

## 2019-12-26 RX ADMIN — ROPIVACAINE HYDROCHLORIDE 22 ML: 5 INJECTION, SOLUTION EPIDURAL; INFILTRATION; PERINEURAL at 06:30

## 2019-12-26 RX ADMIN — MEPIVACAINE HYDROCHLORIDE 7 ML: 20 INJECTION, SOLUTION EPIDURAL; INFILTRATION at 06:30

## 2019-12-26 RX ADMIN — PROPOFOL 140 MCG/KG/MIN: 10 INJECTION, EMULSION INTRAVENOUS at 07:15

## 2019-12-26 RX ADMIN — Medication 2 G: at 07:18

## 2019-12-26 RX ADMIN — DEXAMETHASONE SODIUM PHOSPHATE 4 MG: 4 INJECTION, SOLUTION INTRAMUSCULAR; INTRAVENOUS at 07:26

## 2019-12-26 NOTE — ANESTHESIA PROCEDURE NOTES
Peripheral Block    Start time: 12/26/2019 6:27 AM  End time: 12/26/2019 6:29 AM  Performed by: Haylee Mckeon MD  Authorized by: Haylee Mckeon MD       Pre-procedure: Indications: at surgeon's request and post-op pain management    Preanesthetic Checklist: patient identified, risks and benefits discussed, site marked, timeout performed, anesthesia consent given and patient being monitored    Timeout Time: 06:27          Block Type:   Block Type:   Adductor canal  Laterality:  Left  Monitoring:  Responsive to questions, continuous pulse ox, oxygen, frequent vital sign checks and heart rate  Injection Technique:  Single shot  Procedures: ultrasound guided    Patient Position: supine  Prep: chlorhexidine    Location:  Upper thigh  Needle Type:  Stimuplex  Needle Gauge:  21 G  Needle Localization:  Ultrasound guidance    Assessment:  Number of attempts:  1  Injection Assessment:  Incremental injection every 5 mL, negative aspiration for CSF, no paresthesia, ultrasound image on chart, no intravascular symptoms, negative aspiration for blood and local visualized surrounding nerve on ultrasound  Patient tolerance:  Patient tolerated the procedure well with no immediate complications

## 2019-12-26 NOTE — ANESTHESIA PREPROCEDURE EVALUATION
Relevant Problems   No relevant active problems       Anesthetic History     PONV          Review of Systems / Medical History  Patient summary reviewed, nursing notes reviewed and pertinent labs reviewed    Pulmonary  Within defined limits                 Neuro/Psych         Psychiatric history     Cardiovascular    Hypertension: well controlled          CAD and cardiac stents (ELVIA to LAD 2016)    Exercise tolerance: >4 METS  Comments: TTE 2018- EF 60%,   GI/Hepatic/Renal     GERD: well controlled           Endo/Other        Arthritis     Other Findings              Physical Exam    Airway  Mallampati: II    Neck ROM: normal range of motion   Mouth opening: Normal     Cardiovascular  Regular rate and rhythm,  S1 and S2 normal,  no murmur, click, rub, or gallop             Dental    Dentition: Full lower dentures and Upper partial plate     Pulmonary  Breath sounds clear to auscultation               Abdominal         Other Findings            Anesthetic Plan    ASA: 3  Anesthesia type: total IV anesthesia - femoral single shot and popliteal fossa block      Post-op pain plan if not by surgeon: peripheral nerve block single    Induction: Intravenous  Anesthetic plan and risks discussed with: Patient and Son / Daughter      Discussed TIVA with its benefits (lower risk of nausea and sore throat, etc.) and risks including possible awareness, patient understands and elects to proceed

## 2019-12-26 NOTE — ANESTHESIA PROCEDURE NOTES
Peripheral Block    Start time: 12/26/2019 6:22 AM  End time: 12/26/2019 6:26 AM  Performed by: Gisella Anguiano MD  Authorized by: Gisella Anguiano MD       Pre-procedure:    Indications: at surgeon's request and post-op pain management    Preanesthetic Checklist: patient identified, risks and benefits discussed, site marked, timeout performed, anesthesia consent given and patient being monitored    Timeout Time: 06:22          Block Type:   Block Type:  Popliteal  Laterality:  Left  Monitoring:  Responsive to questions, continuous pulse ox, oxygen, frequent vital sign checks and heart rate  Injection Technique:  Single shot  Procedures: ultrasound guided and nerve stimulator    Patient Position: right lateral decubitus  Prep: chlorhexidine    Location:  Lower thigh  Needle Type:  Stimuplex  Needle Gauge:  21 G  Needle Localization:  Ultrasound guidance and nerve stimulator  Motor Response: minimal motor response >0.4 mA      Assessment:  Number of attempts:  1  Injection Assessment:  Incremental injection every 5 mL, negative aspiration for CSF, no paresthesia, ultrasound image on chart, no intravascular symptoms, negative aspiration for blood and local visualized surrounding nerve on ultrasound  Patient tolerance:  Patient tolerated the procedure well with no immediate complications

## 2019-12-26 NOTE — ANESTHESIA POSTPROCEDURE EVALUATION
Procedure(s):  LEFT SECOND AND THIRD TARSOMETATARSAL FUSION WITH CALCANEAL GRAFT . general    Anesthesia Post Evaluation      Multimodal analgesia: multimodal analgesia used between 6 hours prior to anesthesia start to PACU discharge  Patient location during evaluation: PACU  Patient participation: complete - patient participated  Level of consciousness: awake  Pain score: 0  Pain management: adequate  Airway patency: patent  Anesthetic complications: no  Cardiovascular status: acceptable  Respiratory status: acceptable, spontaneous ventilation and nonlabored ventilation  Hydration status: acceptable  Post anesthesia nausea and vomiting:  none      Vitals Value Taken Time   /59 12/26/2019  8:24 AM   Temp 36.6 °C (97.8 °F) 12/26/2019  8:03 AM   Pulse 85 12/26/2019  8:22 AM   Resp 16 12/26/2019  8:03 AM   SpO2 93 % 12/26/2019  8:22 AM   Vitals shown include unvalidated device data.

## 2019-12-26 NOTE — DISCHARGE INSTRUCTIONS
INSTRUCTIONS FOLLOWING FOOT SURGERY    ACTIVITY  Elevate foot. No Ice    No weight bearing. Use crutches or knee walker until seen in follow up appointment  Don't put anything into the splint to relieve itching etc. Take one 325mg aspirin daily if okay with your medical doctor and you have no GI ulcer. Get up and out of bed frequently. While in bed move the legs as much as possible)    DRESSING CARE Keep dry and in place until follow up appointment    DIET  Day of Surgery: Clear liquids until no nausea or vomiting; then light, bland diet (Baked chicken, plain rice, grits, scrambled egg, toast). Nothing Greasy, fried or spicy today  Advance to regular diet on second day, unless your doctor orders otherwise. PAIN  Take pain medications as directed by your doctor. Call your doctor if pain is NOT relieved by medication. PAIN MED SIDE EFFECTS  Constipation: Lots of fluids, try prune juice, then OTC stool softeners if necessary  Nausea: Take medication with food. CALL YOUR DOCTOR IF YOU HAVE  Excessive bleeding that does not stop after holding mild pressure over the area. Temperature of 101 degrees or above. Redness, excessive swelling or bruising, and/or green or yellow, smelly discharge from incision. Loss of sensation - cold, white or blue toes. AFTER ANESTHESIA  For the first 24 hours and while taking narcotics for pain: DO NOT Drive, Drink Alcoholic beverages, or make important Decisions. Be aware of dizziness following anesthesia and while taking pain medication. Preventing Infection at Home  We care about preventing infection and avoiding the spread of germs - not only when you are in the hospital but also when you return home. When you return home from the hospital, its important to take the following steps to help prevent infection and avoid spreading germs that could infect you and others. Ask everyone in your home to follow these guidelines, too.     Clean Your Hands  · Clean your hands whenever your hands are visibly dirty, before you eat, before or after touching your mouth, nose or eyes, and before preparing food. Clean them after contact with body fluids, using the restroom, touching animals or changing diapers. · When washing hands, wet them with warm water and work up a lather. Rub hands for at least 15 seconds, then rinse them and pat them dry with a clean towel or paper towel. · When using hand sanitizers, it should take about 15 seconds to rub your hands dry. If not, you probably didnt apply enough . Cover Your Sneeze or Cough  Germs are released into the air whenever you sneeze or cough. To prevent the spread of infection:  · Turn away from other people before coughing or sneezing. · Cover your mouth or nose with a tissue when you cough or sneeze. Put the tissue in the trash. · If you dont have a tissue, cough or sneeze into your upper sleeve, not your hands. · Always clean your hands after coughing or sneezing. Care for Wounds  Your skin is your bodys first line of defense against germs, but an open wound leaves an easy way for germs to enter your body. To prevent infection:  · Clean your hands before and after changing wound dressings, and wear gloves to change dressings if recommended by your doctor. · Take special care with IV lines or other devices inserted into the body. If you must touch them, clean your hands first.  · Follow any specific instructions from your doctor to care for your wounds. Contact your doctor if you experience any signs of infection, such as fever or increased redness at the surgical or wound site. Keep a Clean Home  · Clean or wipe commonly touched hard surfaces like door handles, sinks, tabletops, phones and TV remotes. · Use products labeled disinfectant to kill harmful bacteria and viruses. · Use a clean cloth or paper towel to clean and dry surfaces.  Wiping surfaces with a dirty dishcloth, sponge or towel will only spread germs.  · Never share toothbrushes, mcclellan, drinking glasses, utensils, razor blades, face cloths or bath towels to avoid spreading germs. · Be sure that the linens that you sleep on are clean. · Keep pets away from wounds and wash your hands after touching pets, their toys or bedding. We care about you and your health. Remember, preventing infections is a team effort between you, your family, friends and health care providers. DISCHARGE SUMMARY from Nurse    PATIENT INSTRUCTIONS:    After general anesthesia or intravenous sedation, for 24 hours or while taking prescription Narcotics:  · Limit your activities  · Do not drive and operate hazardous machinery  · Do not make important personal or business decisions  · Do  not drink alcoholic beverages  · If you have not urinated within 8 hours after discharge, please contact your surgeon on call. *  Please give a list of your current medications to your Primary Care Provider. *  Please update this list whenever your medications are discontinued, doses are      changed, or new medications (including over-the-counter products) are added. *  Please carry medication information at all times in case of emergency situations. These are general instructions for a healthy lifestyle:    No smoking/ No tobacco products/ Avoid exposure to second hand smoke    Surgeon General's Warning:  Quitting smoking now greatly reduces serious risk to your health. Obesity, smoking, and sedentary lifestyle greatly increases your risk for illness    A healthy diet, regular physical exercise & weight monitoring are important for maintaining a healthy lifestyle    You may be retaining fluid if you have a history of heart failure or if you experience any of the following symptoms:  Weight gain of 3 pounds or more overnight or 5 pounds in a week, increased swelling in our hands or feet or shortness of breath while lying flat in bed.   Please call your doctor as soon as you notice any of these symptoms; do not wait until your next office visit. Recognize signs and symptoms of STROKE:    F-face looks uneven    A-arms unable to move or move unevenly    S-speech slurred or non-existent    T-time-call 911 as soon as signs and symptoms begin-DO NOT go       Back to bed or wait to see if you get better-TIME IS BRAIN.

## 2019-12-26 NOTE — PERIOP NOTES
PACU DISCHARGE NOTE  Vital signs stable, pain well controlled, alert and oriented times three or at baseline, no anesthetic complications. IV removed with catheter tip intact. Patient reports she has Crutches and knee scooter at home and has experience with their use. Written and verbal discharge instructions given, including pain control, dressing care and follow up appointment. Arsen Indiana Dela Cruz, verbalized understanding and signed discharge instructions electronically. All questions answered prior to discharge. Dr Paty Jacobson okay to discharge at this time. Pt and all belongings taken via wheelchair and safely put in vehicle.

## 2019-12-27 NOTE — OP NOTES
[facility]  OPERATIVE REPORT    Name:  Hunter Orozco  MR#:  [id number]  :  1960  ACCOUNT #:  [de-identified]  DATE OF SERVICE:  2019    PREOPERATIVE DIAGNOSIS:  Left second and third tarsometatarsal joint arthritis. POSTOPERATIVE DIAGNOSIS:  Left second and third tarsometatarsal joint arthritis. PROCEDURES PERFORMED:  1. Left second and third tarsometatarsal joint arthrodesis (25222). 2.  Left calcaneal autograft harvest (93897). SURGEON:  Christo Gray III, MD        ANESTHESIA:  Popliteal block with monitored anesthesia care. ESTIMATED BLOOD LOSS:  Minimal.    TOURNIQUET TIME:  25 minutes at 250 mmHg. ANTIBIOTIC PROPHYLAXIS:  Ancef given prior to procedure. INDICATIONS:  The patient is a 71-year-old white female with symptomatic left second and third tarsometatarsal joint arthritis who has failed conservative therapy and desires surgical treatment. Risks and benefits of the procedure including, but not limited to risks of anesthetic complications including myocardial infarction, stroke, and death; and surgical complications including damage to nerves and blood vessels, risk of infection, incomplete pain relief, risk of malunion, risk of nonunion, and need for additional surgery were discussed with the patient. She understands the risks and wishes to proceed with surgery at this time. DETAILS OF PROCEDURE:  The patient's operative site was marked with indelible ink in the preoperative holding area. A block was placed by the Department of Anesthesia. The patient was brought to the operating room and placed supine. After a preoperative surgical time-out, the left lower extremity was identified as the surgical site, prepped and draped in standard sterile fashion using ChloraPrep solution. The patient's previous dorsal approach to the midfoot was then reopened and then extended both distally and proximally.   Neurovascular bundle was carefully identified and protected. Both the second and third tarsometatarsal joint was then identified and prepared using a curette followed by an osteotome. A lateral approach to the heel was then performed at that time. An Acumed bone graft harvest was then introduced and cancellous graft was then obtained and was impacted to both the second and third TMT joint; and then two Synthes compression staples were placed across the second and third TMT joints and confirmed to be adequately placed under image intensification. The wounds were irrigated and closed using Monocryl and nylon sutures. A sterile dressing was then applied followed by a well-padded posterior splint. Anesthesia was discontinued. The patient was transferred back to the recovery bed and taken to the recovery room in satisfactory condition. She appeared to tolerate the procedure well. There were no apparent surgical or anesthetic complications. All needle and sponge counts were correct.       Michael Palomino MD      [id number]  D:  12/26/2019 18:58  T:  12/27/2019 4:31  JOB #:  [id number]

## 2021-01-12 NOTE — ANESTHESIA POSTPROCEDURE EVALUATION
Procedure(s):  ESOPHAGOGASTRODUODENOSCOPY (EGD)  COLONOSCOPY/ BMI 30 ROOM 316  ESOPHAGOGASTRODUODENAL (EGD) BIOPSY  COLONOSCOPY WITH BIOPSY.     total IV anesthesia    Anesthesia Post Evaluation      Multimodal analgesia: multimodal analgesia not used between 6 hours prior to anesthesia start to PACU discharge  Patient location during evaluation: PACU  Patient participation: complete - patient participated  Level of consciousness: awake and alert  Pain management: adequate  Airway patency: patent  Anesthetic complications: no  Cardiovascular status: acceptable  Respiratory status: acceptable  Hydration status: acceptable  Post anesthesia nausea and vomiting:  none      Vitals Value Taken Time   /67 5/21/2019  1:50 PM   Temp 36.7 °C (98 °F) 5/21/2019  1:50 PM   Pulse 63 5/21/2019  1:50 PM   Resp 16 5/21/2019  1:50 PM   SpO2 96 % 5/21/2019  1:50 PM
rolling walker

## 2021-05-21 ENCOUNTER — TRANSCRIBE ORDER (OUTPATIENT)
Dept: SCHEDULING | Age: 61
End: 2021-05-21

## 2021-05-21 DIAGNOSIS — Z12.31 ENCOUNTER FOR SCREENING MAMMOGRAM FOR MALIGNANT NEOPLASM OF BREAST: Primary | ICD-10-CM

## 2021-06-03 PROBLEM — R53.82 CHRONIC FATIGUE: Status: ACTIVE | Noted: 2021-06-03

## 2021-06-03 PROBLEM — I47.1 SVT (SUPRAVENTRICULAR TACHYCARDIA) (HCC): Status: ACTIVE | Noted: 2021-06-03

## 2021-06-07 ENCOUNTER — HOSPITAL ENCOUNTER (OUTPATIENT)
Dept: MAMMOGRAPHY | Age: 61
Discharge: HOME | End: 2021-06-07
Attending: INTERNAL MEDICINE
Payer: COMMERCIAL

## 2021-06-07 DIAGNOSIS — Z12.31 ENCOUNTER FOR SCREENING MAMMOGRAM FOR MALIGNANT NEOPLASM OF BREAST: ICD-10-CM

## 2021-06-07 PROCEDURE — 77067 SCR MAMMO BI INCL CAD: CPT

## 2021-06-21 ENCOUNTER — HOSPITAL ENCOUNTER (OUTPATIENT)
Dept: PHYSICAL THERAPY | Age: 61
Discharge: HOME | End: 2021-06-21
Payer: COMMERCIAL

## 2021-06-21 ENCOUNTER — HOSPITAL ENCOUNTER (OUTPATIENT)
Dept: SURGERY | Age: 61
Discharge: HOME | End: 2021-06-21
Payer: COMMERCIAL

## 2021-06-21 VITALS
BODY MASS INDEX: 30.95 KG/M2 | HEIGHT: 64 IN | OXYGEN SATURATION: 95 % | DIASTOLIC BLOOD PRESSURE: 78 MMHG | HEART RATE: 79 BPM | TEMPERATURE: 97.5 F | WEIGHT: 181.3 LBS | SYSTOLIC BLOOD PRESSURE: 114 MMHG | RESPIRATION RATE: 16 BRPM

## 2021-06-21 LAB
ANION GAP SERPL CALC-SCNC: 7 MMOL/L (ref 7–16)
BACTERIA SPEC CULT: ABNORMAL
BUN SERPL-MCNC: 18 MG/DL (ref 8–23)
CALCIUM SERPL-MCNC: 9.5 MG/DL (ref 8.3–10.4)
CHLORIDE SERPL-SCNC: 105 MMOL/L (ref 98–107)
CO2 SERPL-SCNC: 28 MMOL/L (ref 21–32)
CREAT SERPL-MCNC: 0.87 MG/DL (ref 0.6–1)
ERYTHROCYTE [DISTWIDTH] IN BLOOD BY AUTOMATED COUNT: 14 % (ref 11.9–14.6)
GLUCOSE SERPL-MCNC: 116 MG/DL (ref 65–100)
HCT VFR BLD AUTO: 36.4 % (ref 35.8–46.3)
HGB BLD-MCNC: 11.8 G/DL (ref 11.7–15.4)
MCH RBC QN AUTO: 27.3 PG (ref 26.1–32.9)
MCHC RBC AUTO-ENTMCNC: 32.4 G/DL (ref 31.4–35)
MCV RBC AUTO: 84.3 FL (ref 79.6–97.8)
NRBC # BLD: 0 K/UL (ref 0–0.2)
PLATELET # BLD AUTO: 241 K/UL (ref 150–450)
PMV BLD AUTO: 9.6 FL (ref 9.4–12.3)
POTASSIUM SERPL-SCNC: 4.2 MMOL/L (ref 3.5–5.1)
RBC # BLD AUTO: 4.32 M/UL (ref 4.05–5.2)
SERVICE CMNT-IMP: ABNORMAL
SODIUM SERPL-SCNC: 140 MMOL/L (ref 136–145)
WBC # BLD AUTO: 5.8 K/UL (ref 4.3–11.1)

## 2021-06-21 PROCEDURE — 87641 MR-STAPH DNA AMP PROBE: CPT

## 2021-06-21 PROCEDURE — 80048 BASIC METABOLIC PNL TOTAL CA: CPT

## 2021-06-21 PROCEDURE — 77030027138 HC INCENT SPIROMETER -A

## 2021-06-21 PROCEDURE — 85027 COMPLETE CBC AUTOMATED: CPT

## 2021-06-21 PROCEDURE — 94760 N-INVAS EAR/PLS OXIMETRY 1: CPT

## 2021-06-21 PROCEDURE — 97161 PT EVAL LOW COMPLEX 20 MIN: CPT

## 2021-06-21 PROCEDURE — 36415 COLL VENOUS BLD VENIPUNCTURE: CPT

## 2021-06-21 RX ORDER — CHOLECALCIFEROL (VITAMIN D3) 125 MCG
CAPSULE ORAL AS NEEDED
COMMUNITY
End: 2022-03-29

## 2021-06-21 NOTE — PERIOP NOTES
Recent Results (from the past 8 hour(s))   CBC W/O DIFF    Collection Time: 06/21/21  8:16 AM   Result Value Ref Range    WBC 5.8 4.3 - 11.1 K/uL    RBC 4.32 4.05 - 5.2 M/uL    HGB 11.8 11.7 - 15.4 g/dL    HCT 36.4 35.8 - 46.3 %    MCV 84.3 79.6 - 97.8 FL    MCH 27.3 26.1 - 32.9 PG    MCHC 32.4 31.4 - 35.0 g/dL    RDW 14.0 11.9 - 14.6 %    PLATELET 200 730 - 785 K/uL    MPV 9.6 9.4 - 12.3 FL    ABSOLUTE NRBC 0.00 0.0 - 0.2 K/uL   METABOLIC PANEL, BASIC    Collection Time: 06/21/21  8:16 AM   Result Value Ref Range    Sodium 140 136 - 145 mmol/L    Potassium 4.2 3.5 - 5.1 mmol/L    Chloride 105 98 - 107 mmol/L    CO2 28 21 - 32 mmol/L    Anion gap 7 7 - 16 mmol/L    Glucose 116 (H) 65 - 100 mg/dL    BUN 18 8 - 23 MG/DL    Creatinine 0.87 0.6 - 1.0 MG/DL    GFR est AA >60 >60 ml/min/1.73m2    GFR est non-AA >60 >60 ml/min/1.73m2    Calcium 9.5 8.3 - 10.4 MG/DL

## 2021-06-21 NOTE — PERIOP NOTES
Pt states that she did not receive the COVID vaccine as stated in 6/16/21 note per telephone encounter. Pt states \" I do not want the vaccine\". Call placed to Dr Adenike Diaz MA and she is made aware that pt will need COVID test/orders. Pt instructed that she will be contacted by Dr Adenike Diaz office with details on getting her covid test.  Pt voices understanding.

## 2021-06-21 NOTE — PERIOP NOTES
PLEASE CONTINUE TAKING ALL PRESCRIPTION MEDICATIONS UP TO THE DAY OF SURGERY UNLESS OTHERWISE DIRECTED BELOW. DISCONTINUE all vitamins and supplements 21 days prior to surgery. DISCONTINUE Non-Steriodal Anti-Inflammatory (NSAIDS) such as Advil, Motrin, Ibuprofen, Aleve 5 days prior to surgery. Home Medications to take  the day of surgery    81 mg Aspirin   Pantoprazole        Home Medications   to Hold   Hold Aleve and Ibuprofen for 5 days prior to procedure   Drop 325 mg Aspirin to 81 mg 5 days prior to procedure     Comments    Covid test  @ [facility], [address] [place] [address] ~will be contacted by Dr Saritha Flynn office   On the day before surgery please take Acetaminophen 1000mg in the morning and then again before bed. You may substitute for Tylenol 650 mg. Please do not bring home medications with you on the day of surgery unless otherwise directed by your nurse. If you are instructed to bring home medications, please give them to your nurse as they will be administered by the nursing staff. If you have any questions, please call [facility] [phone] or  [phone]. A copy of this note was provided to the patient for reference.

## 2021-06-21 NOTE — PERIOP NOTES
Patient verified name and . Order for consent not found in EHR and unable to match consent with case posting; patient verified. Type 3 surgery, joint camp assessment complete. Labs per surgeon: unknown, no orders ; Labs per anesthesia protocol: CBC and BMP~results within anesthesia guidelines; routed via fax to pcp, Dr Mariposa Pendleton and to surgeon, Dr Quincy Murdock for review. EKG:completed 10/21/20 and within anesthesia guidelines; stress test 21; cardiology office note per Dr Prosper Peguero 6/3/21~all available for anesthesia reference in Chart Review. Patient COVID test date to be scheduled by Dr Erum Martínez office; Patient aware. The testing center [facility] [address], [place]  and telephone number [phone] provided to the patient if not appointment found. MRSA/MSSA swab collected; pharmacy to review and dose antibiotic as appropriate. Hospital approved surgical skin cleanser and instructions to return bottle on DOS given per hospital policy. Patient provided with handouts including Guide to Surgery, Pain Management, Hand Hygiene, Blood Transfusion Education, and [facility] Brochure. Patient answered medical/surgical history questions at their best of ability. All prior to admission medications documented in [facility]. Original medication prescription bottle  visualized during patient appointment. Patient instructed to hold all vitamins 3 weeks prior to surgery and NSAIDS 5 days prior to surgery. Patient teach back successful and patient demonstrates knowledge of instruction.

## 2021-06-21 NOTE — PROGRESS NOTES
06/21/21 0730   Oxygen Therapy   O2 Sat (%) 96 %   Pulse via Oximetry 92 beats per minute   O2 Device None (Room air)   Pre-Treatment   Breath Sounds Bilateral Clear   Pre FEV1 (liters) 2.4 liters   % Predicted 106     Initial respiratory Assessment completed with pt. Pt was interviewed and evaluated in Joint camp prior to surgery. Patient ID:  Jamal Hager  [id number]  17 y.o.  1960  Surgeon: Dr. Maria Mcadams  Date of Surgery: 7/13/2021  Procedure:  Total Right Knee Arthroplasty  Primary Care Physician: Garima Mijares [phone]  Specialists:     Pt taught proper COUGH technique  DIAPHRAGMATIC BREATHING EXERCISE INSTRUCTIONS GIVEN    History of smoking:   DENIES                 Quit date:         Secondhand smoke:PARENTS    Past procedures with Oxygen desaturation or delayed awakening:DENIES    Past Medical History:   Diagnosis Date    Anemia     Anxiety     Arthritis     CAD (coronary artery disease)     1 stent placed in 2016, takes ASA 81 mg; cannot take statins    Chronic fatigue     sleep medicine referral    Chronic pain     feet and back, takes ibuprofen    DDD (degenerative disc disease), lumbar     Dyslipidemia     Essential hypertension     GERD (gastroesophageal reflux disease)     Insomnia     Leg pain     Localized edema     ankles, feet, knees    Mixed hyperlipidemia     Nausea & vomiting     post op nausea and vomiting    Psychiatric disorder     anxiety    PUD (peptic ulcer disease)     S/P PTCA (percutaneous transluminal coronary angioplasty) 2016    LAD     Scoliosis     SOB (shortness of breath)     Spinal stenosis     SVT (supraventricular tachycardia) ([address]     Unstable angina ([address]         Respiratory history:DENIES SOB                                                                  Respiratory meds:  DENIES    FAMILY PRESENT:             NO     PAST SLEEP STUDY:       PT IS SCHEDULED TO DO STUDY  HX OF ROD: DENIES  ROD assessment:                                               SLEEPS ON SIDE        PHYSICAL EXAM   Body mass index is 31.12 kg/m².    Visit Vitals  /78 (BP 1 Location: Right upper arm, BP Patient Position: Sitting)   Pulse 79   Temp 97.5 °F (36.4 °C)   Resp 16   Ht 5' 4\" (1.626 m)   Wt 82.2 kg (181 lb 4.8 oz)   SpO2 95%   BMI 31.12 kg/m²     Neck circumference: 38.5     cm    Loud snoring:                                                 YES            Witnessed apnea or wakening gasping or choking:       GASPING  Awakens with headaches:                                               DENIES  Morning or daytime tiredness/ sleepiness:                         TIRED  Dry mouth or sore throat in morning:            YES                                              Chan stage:  4                                   SACS score:9  Stop Bang   STOP-BANG  Does the patient snore loudly (louder than talking or loud enough to be heard through closed doors)?: Yes  Does the patient often feel tired, fatigued, or sleepy during the daytime, even after a \"good\" night's sleep?: Yes  Has anyone ever observed the patient stop breathing during their sleep? : No  Does the patient have or are they being treated for high blood pressure?: Yes  Is the patient's BMI greater than 35?: No  Is your neck circumference greater than 17 inches (Male) or 16 inches (Female)?: Yes  Is the patient older than 48?: Yes  Is the patient male?: No  ROD Score: 5  Has the patient been referred to Sleep Medicine?: No  Has the patient previously been diagnosed with Obstructive Sleep Apnea?: No                            PT IS SCHEDULED TO DO SLEEP STUDY       [facility]                                   Referrals:    Pt. Phone Number:

## 2021-06-21 NOTE — ADVANCED PRACTICE NURSE
Total Joint Surgery Preoperative Chart Review      Patient ID:  Jamal Hager  [id number]  36 y.o.  1960  Surgeon: Dr. Maria Mcadams  Date of Surgery: 7/13/2021  Procedure: Total Right Knee Arthroplasty  Primary Care Physician: Garima Mijares [phone]  Specialty Physician(s):      Subjective:   Jamal Hager is a 62 y.o. WHITE/NON- female who presents for preoperative evaluation for Total Right Knee arthroplasty. This is a preoperative chart review note based on data collected by the nurse at the surgical Pre-Assessment visit.     Past Medical History:   Diagnosis Date    Anemia     Anxiety     Arthritis     CAD (coronary artery disease)     1 stent placed in 2016, takes ASA 81 mg; cannot take statins    Chronic fatigue     sleep medicine referral    Chronic pain     feet and back, takes ibuprofen    DDD (degenerative disc disease), lumbar     Dyslipidemia     Essential hypertension     GERD (gastroesophageal reflux disease)     Insomnia     Leg pain     Localized edema     ankles, feet, knees    Mixed hyperlipidemia     Nausea & vomiting     post op nausea and vomiting    Psychiatric disorder     anxiety    PUD (peptic ulcer disease)     S/P PTCA (percutaneous transluminal coronary angioplasty) 2016    LAD     Scoliosis     SOB (shortness of breath)     Spinal stenosis     SVT (supraventricular tachycardia) ([address]     Unstable angina ([address]       Past Surgical History:   Procedure Laterality Date    COLONOSCOPY N/A 5/21/2019    COLONOSCOPY/ BMI 30 ROOM 316 performed by Rj Penn MD at [address] [place] [address], 1999    HX GI      cholecystectomy    HX HEART CATHETERIZATION  05/2016    LAD stent 2016    HX HEART CATHETERIZATION  07/2017    no stents    HX HEART CATHETERIZATION  05/2019    no stents    HX HYSTERECTOMY  2002    hysterectomy    HX LAP CHOLECYSTECTOMY      HX ORTHOPAEDIC Left     left foot. scraped bone down for spurs    HX ORTHOPAEDIC Left 12/26/2019    Left second and third tarsometatarsal joint arthrodesis     HX TONSILLECTOMY      NEUROLOGICAL PROCEDURE UNLISTED      neck surgery, herniated disk C4-C5, x2 6 months apart     Family History   Problem Relation Age of Onset    Diabetes Mother     Heart Disease Mother     Kidney Disease Mother     Heart Disease Father     Breast Cancer Neg Hx       Social History     Tobacco Use    Smoking status: Never Smoker    Smokeless tobacco: Never Used   Substance Use Topics    Alcohol use: No       Prior to Admission medications    Medication Sig Start Date End Date Taking? Authorizing Provider   multivit-minerals/folic acid (MULTIVITAMIN GUMMIES PO) Take  by mouth daily. Yes Provider, Historical   OTHER,NON-FORMULARY, daily. Ashwagandha vitamins~2 gummies daily   Yes Provider, Historical   naproxen sodium (Aleve) 220 mg cap Take  by mouth as needed. Indications: joint damage causing pain and loss of function, pain   Yes Provider, Historical   aspirin (ASPIRIN) 325 mg tablet Take 325 mg by mouth daily. Drop to 81 mg Aspirin 5 days prior to surgery; Take / use AM day of surgery  per anesthesia protocols. Indications: treatment to prevent a heart attack   Yes Provider, Historical   nitroglycerin (NITROSTAT) 0.4 mg SL tablet 1 Tab by SubLINGual route every five (5) minutes as needed for Chest Pain. Up to 3 doses. 10/21/20  Yes Vangie Watt MD   DULoxetine (CYMBALTA) 30 mg capsule Take 30 mg by mouth nightly. Indications: repeated episodes of anxiety   Yes Provider, Historical   ibuprofen (MOTRIN) 200 mg tablet Take 800 mg by mouth every six (6) hours as needed for Pain. Yes Provider, Historical   pantoprazole (PROTONIX) 40 mg tablet Take 1 Tab by mouth daily. Patient taking differently: Take 40 mg by mouth daily. Take / use AM day of surgery  per anesthesia protocols.   Indications: gastroesophageal reflux disease 5/22/19  Yes Joen Duverney, MD DULoxetine (CYMBALTA) 60 mg capsule Take 60 mg by mouth nightly. Takes total of 90 mg  Indications: anxiousness associated with depression, repeated episodes of anxiety   Yes Provider, Historical   trazodone HCl (DESYREL PO) Take 200 mg by mouth nightly. For insomnia and anxiety  Indications: pain   Yes Provider, Historical     Allergies   Allergen Reactions    Acetaminophen Itching     Pt denies    Hydrocodone Bitartrate Itching    Lortab [Hydrocodone-Acetaminophen] Rash     Rash and itch    Pcn [Penicillins] Rash    Statins-Hmg-Coa Reductase Inhibitors Other (comments)     Muscle aches          Objective:     Physical Exam:   Patient Vitals for the past 24 hrs:   Temp Pulse Resp BP SpO2   06/21/21 0900 97.5 °F (36.4 °C) 79 16 114/78 95 %       ECG:    EKG Results     None          Data Review:   Labs:   Recent Results (from the past 24 hour(s))   CBC W/O DIFF    Collection Time: 06/21/21  8:16 AM   Result Value Ref Range    WBC 5.8 4.3 - 11.1 K/uL    RBC 4.32 4.05 - 5.2 M/uL    HGB 11.8 11.7 - 15.4 g/dL    HCT 36.4 35.8 - 46.3 %    MCV 84.3 79.6 - 97.8 FL    MCH 27.3 26.1 - 32.9 PG    MCHC 32.4 31.4 - 35.0 g/dL    RDW 14.0 11.9 - 14.6 %    PLATELET 656 887 - 134 K/uL    MPV 9.6 9.4 - 12.3 FL    ABSOLUTE NRBC 0.00 0.0 - 0.2 K/uL   METABOLIC PANEL, BASIC    Collection Time: 06/21/21  8:16 AM   Result Value Ref Range    Sodium 140 136 - 145 mmol/L    Potassium 4.2 3.5 - 5.1 mmol/L    Chloride 105 98 - 107 mmol/L    CO2 28 21 - 32 mmol/L    Anion gap 7 7 - 16 mmol/L    Glucose 116 (H) 65 - 100 mg/dL    BUN 18 8 - 23 MG/DL    Creatinine 0.87 0.6 - 1.0 MG/DL    GFR est AA >60 >60 ml/min/1.73m2    GFR est non-AA >60 >60 ml/min/1.73m2    Calcium 9.5 8.3 - 10.4 MG/DL   MSSA/MRSA SC BY PCR, NASAL SWAB    Collection Time: 06/21/21  8:35 AM    Specimen: Nasal swab   Result Value Ref Range    Special Requests: NASAL      Culture result: (A)       MRSA target DNA detected, SA target DNA detected.        A positive test result does not necessarily indicate the presence of viable organisms. It is however, presumptive for the presence of MRSA or SA. Problem List:  )  Patient Active Problem List   Diagnosis Code    Encounter to establish care Z76.89    S/P PTCA (percutaneous transluminal coronary angioplasty) Z98.61    Coronary artery disease due to lipid rich plaque I25.10, I25.83    Mixed hyperlipidemia E78.2    Chest pain R07.9    SOB (shortness of breath) R06.02    Dyslipidemia E78.5    Localized edema R60.0    Leg pain M79.606    Unstable angina (HCC) I20.0    Essential hypertension with goal blood pressure less than 130/85 I10    SVT (supraventricular tachycardia) (HCC) I47.1    Chronic fatigue R53.82       Total Joint Surgery Pre-Assessment Recommendations:           Recommend continuous saturation monitoring hours of sleep, during hospitalization. O2 prn per respiratory protocol.        Signed By: VI Carrion    June 21, 2021

## 2021-06-21 NOTE — PROGRESS NOTES
Adriel Gallardo  : (57 y.o.) [facility]  [address]  [phone]       Physical Therapy Prehab Plan of Treatment and Evaluation Summary:2021    ICD-10: Treatment Diagnosis:   · Pain in Right Knee (M25.561)  · Stiffness of Right Knee, Not elsewhere classified (M25.661)  · Difficulty in walking, Not elsewhere classified (R26.2)  Precautions/Allergies:   Acetaminophen, Hydrocodone bitartrate, Lortab [hydrocodone-acetaminophen], Pcn [penicillins], and Statins-hmg-coa reductase inhibitors  MEDICAL/REFERRING DIAGNOSIS:  Unilateral primary osteoarthritis, right knee [M17.11]  REFERRING PHYSICIAN: Judith Gamino MD  DATE OF SURGERY: 21    Assessment:   Comments:  Pt. Lives alone but plans to go home with support from her sons and daughter in laws. She reports chronic pain in knees, left foot and spine. She was going to confirm with the nurse today that her surgery is at the [facility].       PROBLEM LIST (Impacting functional limitations):  Ms. Renetta Sparrow presents with the following right lower extremity(s) problems:  1. Strength  2. Range of Motion  3. Home Exercise Program  4. Pain   INTERVENTIONS PLANNED:  1. Home Exercise Program  2. Educational Discussion      TREATMENT PLAN: Effective Dates: 2021 TO 2021. Frequency/Duration: Patient to continue to perform home exercise program at least twice per day up until her surgery. GOALS: (Goals have been discussed and agreed upon with patient.)  Discharge Goals: Time Frame: 1 Day  1. Patient will demonstrate independence with a home exercise program designed to increase strength, range of motion and pain control to minimize functional deficits and optimize patient for total joint replacement.   Rehabilitation Potential For Stated Goals: Good  Regarding Paulette Naranjo's therapy, I certify that the treatment plan above will be carried out by a therapist or under their direction. Thank you for this referral,  Kirsten Yarbrough, PT               HISTORY:   Present Symptoms:  Pain Intensity 1:  (8 at worst)  Pain Location 1: Knee   History of Present Injury/Illness (Reason for Referral):  Medical/Referring Diagnosis: Unilateral primary osteoarthritis, right knee [M17.11]   Past Medical History/Comorbidities:   Ms. aSra Zendejas  has a past medical history of Anemia, Anxiety, Arthritis, CAD (coronary artery disease), Chronic fatigue, Chronic pain, DDD (degenerative disc disease), lumbar, Dyslipidemia, Essential hypertension, GERD (gastroesophageal reflux disease), Insomnia, Leg pain, Localized edema, Mixed hyperlipidemia, Nausea & vomiting, Psychiatric disorder, PUD (peptic ulcer disease), S/P PTCA (percutaneous transluminal coronary angioplasty) (), Scoliosis, SOB (shortness of breath), Spinal stenosis, SVT (supraventricular tachycardia) ([address] and Unstable angina ([address] She also has no past medical history of Difficult intubation, Malignant hyperthermia due to anesthesia, or Pseudocholinesterase deficiency. Ms. Sara Zendejas  has a past surgical history that includes hx gi; colonoscopy (N/A, 2019); hx heart catheterization (2016); hx heart catheterization (2017); hx heart catheterization (2019); neurological procedure unlisted; hx lap cholecystectomy; hx hysterectomy (); hx  section (, ); hx tonsillectomy; hx orthopaedic (Left); and hx orthopaedic (Left, 2019). 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, on feet all day  Dominant Side:  RIGHT  Current Medications: See Pre-assessment nursing note   Number of Personal Factors/Comorbidities that affect the Plan of Care: 1-2: MODERATE COMPLEXITY   EXAMINATION:   ADLs (Current Functional Status):   Ambulation:  [x] Independent  [] Walk Indoors Only  [] Walk Outdoors  [] Use Assistive Device  [] Use Wheelchair Only Dressing:  [x] Independent  Requires Assistance from Someone for:  [] Sock/Shoes  [] Pants  [] Everything   Bathing/Showering:   [x] Independent  [] Requires Assistance from Someone  [] Sponge Bath Only Household Activities:  [x] Routine house and yard work  [] Light Housework Only  [] None   Observation/Orthostatic Postural Assessment:       ROM/Flexibility:   Gross Assessment: Yes  AROM: Generally decreased, functional (left LE)                       RLE Assessment  RLE Assessment (WDL): Exceptions to WDL  RLE AROM  R Knee Flexion: 127  R Knee Extension: 5   Strength:   Gross Assessment: Yes  Strength: Generally decreased, functional (left LE)              RLE Strength  R Knee Flexion: 4  R Knee Extension: 4   Functional Mobility:    Gross Assessment: Yes    Gait Description (WDL): Exceptions to WDL  Stand to Sit: Additional time, Independent  Sit to Stand: Independent, Additional time  Distance (ft): 200 Feet (ft)  Ambulation - Level of Assistance: Independent  Stance: Right decreased  Gait Abnormalities: Antalgic          Balance:    Sitting: Intact  Standing: Intact   Body Structures Involved:  1. Bones  2. Joints  3. Muscles  4. Ligaments Body Functions Affected:  1. Movement Related Activities and Participation Affected:  1. Mobility   Number of elements that affect the Plan of Care: 3: MODERATE COMPLEXITY   CLINICAL PRESENTATION:   Presentation: Stable and uncomplicated: LOW COMPLEXITY   CLINICAL DECISION MAKING:   Outcome Measure:    Tool Used: Lower Extremity Functional Scale (LEFS)  Score:  Initial: 25/80 Most Recent: X/80 (Date: -- )   Interpretation of Score: 20 questions each scored on a 5 point scale with 0 representing \"extreme difficulty or unable to perform\" and 4 representing \"no difficulty\". The lower the score, the greater the functional disability. 80/80 represents no disability. Minimal detectable change is 9 points. Medical Necessity:   · Ms. Roberto Carlos Aleman is expected to optimize her lower extremity strength and ROM in preparation for joint replacement surgery. Reason for Services/Other Comments:  · Achieve baseline assesment of musculoskeletal system, functional mobility and home environment. , educate in PT HEP in preparation for surgery, educate in hospital plan of care. Use of outcome tool(s) and clinical judgement create a POC that gives a: Clear prediction of patient's progress: LOW COMPLEXITY   TREATMENT:   Treatment/Session Assessment:  Patient was instructed in PT- HEP to increase strength and ROM in LEs. Answered all questions. · Post session pain:  Knee pain  · Compliance with Program/Exercises: compliant most of the time.   Total Treatment Duration:  PT Patient Time In/Time Out  Time In: 0715  Time Out: [address], PT

## 2021-06-23 NOTE — PERIOP NOTES
ECHO report from 06/22/2021 found in Chart Review for anesthesia reference with following findings:    Left Ventricle Normal cavity size, wall thickness and systolic function (ejection fraction normal). The calculated EF is 60%. There is inconclusive left ventricular diastolic function. Inconclusive left ventricular diastolic pressure. Left Atrium Normal cavity size. Left Atrium volume index is 21.2 mL/m2. Right Ventricle Normal cavity size and global systolic function. Right Atrium Normal cavity size. Aortic Valve Normal valve structure and no stenosis. Trace aortic valve regurgitation. Mitral Valve Normal valve structure, no stenosis and no regurgitation. Tricuspid Valve Normal valve structure and no stenosis. Mild regurgitation. Right Ventricular Arterial Pressure (RVSP) is 19 mmHg. Pulmonic Valve Normal valve structure, no stenosis and no regurgitation. Aorta Normal aortic root. Pericardium No evidence of pericardial effusion.

## 2021-07-07 ENCOUNTER — HOSPITAL ENCOUNTER (OUTPATIENT)
Dept: SLEEP MEDICINE | Age: 61
Discharge: HOME | End: 2021-07-07
Payer: COMMERCIAL

## 2021-07-07 PROCEDURE — 95810 POLYSOM 6/> YRS 4/> PARAM: CPT

## 2021-07-07 NOTE — H&P
HPI: Patient presents for preop evaluation for her planned right total knee arthroplasty. She denies any interval change. She has tricompartmental osteoarthritis that is worse in the medial compartment with near bone-on-bone change. She has exhausted conservative management. She is a non-smoker.     ROS-noncontributory except as noted above      Past Medical History:   Diagnosis Date    Anemia     Anxiety     Arthritis     CAD (coronary artery disease)     1 stent placed in 2016, takes ASA 81 mg; cannot take statins    Chronic fatigue     sleep medicine referral    Chronic pain     feet and back, takes ibuprofen    DDD (degenerative disc disease), lumbar     Dyslipidemia     Essential hypertension     GERD (gastroesophageal reflux disease)     Insomnia     Leg pain     Localized edema     ankles, feet, knees    Mixed hyperlipidemia     Nausea & vomiting     post op nausea and vomiting    Psychiatric disorder     anxiety    PUD (peptic ulcer disease)     S/P PTCA (percutaneous transluminal coronary angioplasty) 2016    LAD     Scoliosis     SOB (shortness of breath)     Spinal stenosis     SVT (supraventricular tachycardia) ([address]     Unstable angina ([address]        Past Surgical History:   Procedure Laterality Date    COLONOSCOPY N/A 5/21/2019    COLONOSCOPY/ BMI 30 ROOM 316 performed by Taco Villalobos MD at [address] [place] [address], 1999    HX GI      cholecystectomy    HX HEART CATHETERIZATION  05/2016    LAD stent 2016    HX HEART CATHETERIZATION  07/2017    no stents    HX HEART CATHETERIZATION  05/2019    no stents    HX HYSTERECTOMY  2002    hysterectomy    HX LAP CHOLECYSTECTOMY      HX ORTHOPAEDIC Left     left foot. scraped bone down for spurs    HX ORTHOPAEDIC Left 12/26/2019    Left second and third tarsometatarsal joint arthrodesis     HX TONSILLECTOMY      NEUROLOGICAL PROCEDURE UNLISTED      neck surgery, herniated disk C4-C5, x2 6 months apart Family History   Problem Relation Age of Onset    Diabetes Mother     Heart Disease Mother     Kidney Disease Mother     Heart Disease Father     Breast Cancer Neg Hx         Current Outpatient Medications   Medication Sig Dispense Refill    multivit-minerals/folic acid (MULTIVITAMIN GUMMIES PO) Take  by mouth daily.  OTHER,NON-FORMULARY, daily. Ashwagandha vitamins~2 gummies daily      naproxen sodium (Aleve) 220 mg cap Take  by mouth as needed. Indications: joint damage causing pain and loss of function, pain      aspirin (ASPIRIN) 325 mg tablet Take 325 mg by mouth daily. Drop to 81 mg Aspirin 5 days prior to surgery; Take / use AM day of surgery  per anesthesia protocols. Indications: treatment to prevent a heart attack      nitroglycerin (NITROSTAT) 0.4 mg SL tablet 1 Tab by SubLINGual route every five (5) minutes as needed for Chest Pain. Up to 3 doses. 1 Bottle 3    DULoxetine (CYMBALTA) 30 mg capsule Take 30 mg by mouth nightly. Indications: repeated episodes of anxiety      ibuprofen (MOTRIN) 200 mg tablet Take 800 mg by mouth every six (6) hours as needed for Pain.  pantoprazole (PROTONIX) 40 mg tablet Take 1 Tab by mouth daily. (Patient taking differently: Take 40 mg by mouth daily. Take / use AM day of surgery  per anesthesia protocols. Indications: gastroesophageal reflux disease) 30 Tab 6    DULoxetine (CYMBALTA) 60 mg capsule Take 60 mg by mouth nightly. Takes total of 90 mg  Indications: anxiousness associated with depression, repeated episodes of anxiety      trazodone HCl (DESYREL PO) Take 200 mg by mouth nightly.  For insomnia and anxiety  Indications: pain          Allergies   Allergen Reactions    Acetaminophen Itching     Pt denies    Hydrocodone Bitartrate Itching    Lortab [Hydrocodone-Acetaminophen] Rash     Rash and itch    Pcn [Penicillins] Rash    Statins-Hmg-Coa Reductase Inhibitors Other (comments)     Muscle aches        Vitals:    07/07/21 1257 Weight: 188 lb (85.3 kg)        PHYSICAL EXAM: Pleasant cooperative female no acute distress. No pain with hip range of motion or straight leg raise. Synovial thickening with joint line tenderness about the right knee. Compartments are soft and nontender. No focal motor or sensory nerve deficits. No adenopathy or skin changes. Palpable pulses distally. DIAGNOSTIC TESTS: Previous x-rays from April 22, 2021 are reviewed and demonstrate tricompartmental osteoarthritis right knee. ICD-10-CM ICD-9-CM    1. Arthritis of right knee  M17.11 716.96         PLAN: Right knee arthritis. We discussed risks benefits and alternatives of right total knee arthroplasty. She expressed understanding and strongly desires to proceed. We will proceed with surgery as scheduled.

## 2021-07-12 ENCOUNTER — ANESTHESIA EVENT (OUTPATIENT)
Dept: SURGERY | Age: 61
DRG: 470 | End: 2021-07-12
Payer: COMMERCIAL

## 2021-07-13 ENCOUNTER — ANESTHESIA (OUTPATIENT)
Dept: SURGERY | Age: 61
DRG: 470 | End: 2021-07-13
Payer: COMMERCIAL

## 2021-07-13 ENCOUNTER — HOSPITAL ENCOUNTER (INPATIENT)
Age: 61
LOS: 1 days | Discharge: HOME HEALTH | DRG: 470 | End: 2021-07-14
Attending: ORTHOPAEDIC SURGERY | Admitting: ORTHOPAEDIC SURGERY
Payer: COMMERCIAL

## 2021-07-13 DIAGNOSIS — M17.11 ARTHRITIS OF RIGHT KNEE: Primary | ICD-10-CM

## 2021-07-13 DIAGNOSIS — Z96.659 STATUS POST TOTAL KNEE REPLACEMENT, UNSPECIFIED LATERALITY: ICD-10-CM

## 2021-07-13 LAB
ABO + RH BLD: NORMAL
ANION GAP SERPL CALC-SCNC: 6 MMOL/L (ref 7–16)
APPEARANCE UR: CLEAR
APTT PPP: 26.7 SEC (ref 24.1–35.1)
BACTERIA URNS QL MICRO: 0 /HPF
BASOPHILS # BLD: 0.1 K/UL (ref 0–0.2)
BASOPHILS NFR BLD: 1 % (ref 0–2)
BILIRUB UR QL: NEGATIVE
BLOOD GROUP ANTIBODIES SERPL: NORMAL
BUN SERPL-MCNC: 21 MG/DL (ref 8–23)
CALCIUM SERPL-MCNC: 9.1 MG/DL (ref 8.3–10.4)
CASTS URNS QL MICRO: ABNORMAL /LPF
CHLORIDE SERPL-SCNC: 108 MMOL/L (ref 98–107)
CO2 SERPL-SCNC: 27 MMOL/L (ref 21–32)
COLOR UR: YELLOW
CREAT SERPL-MCNC: 0.97 MG/DL (ref 0.6–1)
DIFFERENTIAL METHOD BLD: NORMAL
EOSINOPHIL # BLD: 0.3 K/UL (ref 0–0.8)
EOSINOPHIL NFR BLD: 5 % (ref 0.5–7.8)
EPI CELLS #/AREA URNS HPF: ABNORMAL /HPF
ERYTHROCYTE [DISTWIDTH] IN BLOOD BY AUTOMATED COUNT: 13.7 % (ref 11.9–14.6)
EST. AVERAGE GLUCOSE BLD GHB EST-MCNC: 123 MG/DL
GLUCOSE SERPL-MCNC: 146 MG/DL (ref 65–100)
GLUCOSE UR STRIP.AUTO-MCNC: NEGATIVE MG/DL
HBA1C MFR BLD: 5.9 % (ref 4.2–6.3)
HCT VFR BLD AUTO: 36.8 % (ref 35.8–46.3)
HGB BLD-MCNC: 10.1 G/DL (ref 11.7–15.4)
HGB BLD-MCNC: 12 G/DL (ref 11.7–15.4)
HGB UR QL STRIP: NEGATIVE
IMM GRANULOCYTES # BLD AUTO: 0 K/UL (ref 0–0.5)
IMM GRANULOCYTES NFR BLD AUTO: 0 % (ref 0–5)
INR PPP: 0.9
KETONES UR QL STRIP.AUTO: NEGATIVE MG/DL
LEUKOCYTE ESTERASE UR QL STRIP.AUTO: ABNORMAL
LYMPHOCYTES # BLD: 1.6 K/UL (ref 0.5–4.6)
LYMPHOCYTES NFR BLD: 26 % (ref 13–44)
MCH RBC QN AUTO: 27.5 PG (ref 26.1–32.9)
MCHC RBC AUTO-ENTMCNC: 32.6 G/DL (ref 31.4–35)
MCV RBC AUTO: 84.2 FL (ref 79.6–97.8)
MONOCYTES # BLD: 0.4 K/UL (ref 0.1–1.3)
MONOCYTES NFR BLD: 7 % (ref 4–12)
NEUTS SEG # BLD: 3.7 K/UL (ref 1.7–8.2)
NEUTS SEG NFR BLD: 61 % (ref 43–78)
NITRITE UR QL STRIP.AUTO: NEGATIVE
NRBC # BLD: 0 K/UL (ref 0–0.2)
PH UR STRIP: 6 [PH] (ref 5–9)
PLATELET # BLD AUTO: 236 K/UL (ref 150–450)
PMV BLD AUTO: 9.6 FL (ref 9.4–12.3)
POTASSIUM SERPL-SCNC: 3.7 MMOL/L (ref 3.5–5.1)
PROT UR STRIP-MCNC: NEGATIVE MG/DL
PROTHROMBIN TIME: 12.7 SEC (ref 12.5–14.7)
RBC # BLD AUTO: 4.37 M/UL (ref 4.05–5.2)
RBC #/AREA URNS HPF: 0 /HPF
SODIUM SERPL-SCNC: 141 MMOL/L (ref 136–145)
SP GR UR REFRACTOMETRY: 1.02 (ref 1–1.02)
SPECIMEN EXP DATE BLD: NORMAL
UROBILINOGEN UR QL STRIP.AUTO: 0.2 EU/DL (ref 0.2–1)
WBC # BLD AUTO: 6 K/UL (ref 4.3–11.1)
WBC URNS QL MICRO: ABNORMAL /HPF

## 2021-07-13 PROCEDURE — C1776 JOINT DEVICE (IMPLANTABLE): HCPCS | Performed by: ORTHOPAEDIC SURGERY

## 2021-07-13 PROCEDURE — 77030002933 HC SUT MCRYL J&J -A: Performed by: ORTHOPAEDIC SURGERY

## 2021-07-13 PROCEDURE — 74011250637 HC RX REV CODE- 250/637: Performed by: ORTHOPAEDIC SURGERY

## 2021-07-13 PROCEDURE — 83036 HEMOGLOBIN GLYCOSYLATED A1C: CPT

## 2021-07-13 PROCEDURE — 77030003602 HC NDL NRV BLK BBMI -B: Performed by: ANESTHESIOLOGY

## 2021-07-13 PROCEDURE — 76010010054 HC POST OP PAIN BLOCK: Performed by: ORTHOPAEDIC SURGERY

## 2021-07-13 PROCEDURE — 74011250636 HC RX REV CODE- 250/636: Performed by: ANESTHESIOLOGY

## 2021-07-13 PROCEDURE — 77030018547 HC SUT ETHBND1 J&J -B: Performed by: ORTHOPAEDIC SURGERY

## 2021-07-13 PROCEDURE — 86901 BLOOD TYPING SEROLOGIC RH(D): CPT

## 2021-07-13 PROCEDURE — 74011250637 HC RX REV CODE- 250/637: Performed by: NURSE PRACTITIONER

## 2021-07-13 PROCEDURE — 77030003665 HC NDL SPN BBMI -A: Performed by: ANESTHESIOLOGY

## 2021-07-13 PROCEDURE — 65270000029 HC RM PRIVATE

## 2021-07-13 PROCEDURE — 2709999900 HC NON-CHARGEABLE SUPPLY: Performed by: ORTHOPAEDIC SURGERY

## 2021-07-13 PROCEDURE — 74011000250 HC RX REV CODE- 250: Performed by: ANESTHESIOLOGY

## 2021-07-13 PROCEDURE — 77030006790 HC BLD SAW OSC ZIMM -B: Performed by: ORTHOPAEDIC SURGERY

## 2021-07-13 PROCEDURE — 77030040830 HC CATH URETH FOL MDII -A: Performed by: ORTHOPAEDIC SURGERY

## 2021-07-13 PROCEDURE — 77030012935 HC DRSG AQUACEL BMS -B: Performed by: ORTHOPAEDIC SURGERY

## 2021-07-13 PROCEDURE — 77030040922 HC BLNKT HYPOTHRM STRY -A: Performed by: ANESTHESIOLOGY

## 2021-07-13 PROCEDURE — 80048 BASIC METABOLIC PNL TOTAL CA: CPT

## 2021-07-13 PROCEDURE — 85730 THROMBOPLASTIN TIME PARTIAL: CPT

## 2021-07-13 PROCEDURE — 77030008462 HC STPLR SKN PROX J&J -A: Performed by: ORTHOPAEDIC SURGERY

## 2021-07-13 PROCEDURE — 77030020365 HC SOL INJ SOD CL 0.9% 50ML: Performed by: ORTHOPAEDIC SURGERY

## 2021-07-13 PROCEDURE — 74011250636 HC RX REV CODE- 250/636: Performed by: ORTHOPAEDIC SURGERY

## 2021-07-13 PROCEDURE — 27447 TOTAL KNEE ARTHROPLASTY: CPT | Performed by: ORTHOPAEDIC SURGERY

## 2021-07-13 PROCEDURE — 0SRC0J9 REPLACEMENT OF RIGHT KNEE JOINT WITH SYNTHETIC SUBSTITUTE, CEMENTED, OPEN APPROACH: ICD-10-PCS | Performed by: ORTHOPAEDIC SURGERY

## 2021-07-13 PROCEDURE — 74011250637 HC RX REV CODE- 250/637: Performed by: ANESTHESIOLOGY

## 2021-07-13 PROCEDURE — 85025 COMPLETE CBC W/AUTO DIFF WBC: CPT

## 2021-07-13 PROCEDURE — 74011000250 HC RX REV CODE- 250: Performed by: REGISTERED NURSE

## 2021-07-13 PROCEDURE — 3E0T3BZ INTRODUCTION OF ANESTHETIC AGENT INTO PERIPHERAL NERVES AND PLEXI, PERCUTANEOUS APPROACH: ICD-10-PCS | Performed by: ANESTHESIOLOGY

## 2021-07-13 PROCEDURE — 77030020274 HC MISC IMPL ORTHOPEDIC: Performed by: ORTHOPAEDIC SURGERY

## 2021-07-13 PROCEDURE — 76210000006 HC OR PH I REC 0.5 TO 1 HR: Performed by: ORTHOPAEDIC SURGERY

## 2021-07-13 PROCEDURE — 77030019557 HC ELECTRD VES SEAL MEDT -F: Performed by: ORTHOPAEDIC SURGERY

## 2021-07-13 PROCEDURE — 77030007880 HC KT SPN EPDRL BBMI -B: Performed by: ANESTHESIOLOGY

## 2021-07-13 PROCEDURE — 77030011208: Performed by: ORTHOPAEDIC SURGERY

## 2021-07-13 PROCEDURE — 85610 PROTHROMBIN TIME: CPT

## 2021-07-13 PROCEDURE — 85018 HEMOGLOBIN: CPT

## 2021-07-13 PROCEDURE — 76060000035 HC ANESTHESIA 2 TO 2.5 HR: Performed by: ORTHOPAEDIC SURGERY

## 2021-07-13 PROCEDURE — 36415 COLL VENOUS BLD VENIPUNCTURE: CPT

## 2021-07-13 PROCEDURE — 76942 ECHO GUIDE FOR BIOPSY: CPT | Performed by: ORTHOPAEDIC SURGERY

## 2021-07-13 PROCEDURE — 74011250636 HC RX REV CODE- 250/636: Performed by: REGISTERED NURSE

## 2021-07-13 PROCEDURE — 81001 URINALYSIS AUTO W/SCOPE: CPT

## 2021-07-13 PROCEDURE — 76010000171 HC OR TIME 2 TO 2.5 HR INTENSV-TIER 1: Performed by: ORTHOPAEDIC SURGERY

## 2021-07-13 DEVICE — JOURNEY II BCS XLPE ARTICULAR                                    INSERT SIZE 3-4 RIGHT 11MM
Type: IMPLANTABLE DEVICE | Site: KNEE | Status: FUNCTIONAL
Brand: JOURNEY

## 2021-07-13 DEVICE — KNEE K1 TOT HEMI STD CEM IMPL CAPPED K1 SN: Type: IMPLANTABLE DEVICE | Status: FUNCTIONAL

## 2021-07-13 DEVICE — JOURNEY TIBIAL BASEPLATE NONPOROUS                                    RIGHT SIZE 3
Type: IMPLANTABLE DEVICE | Site: KNEE | Status: FUNCTIONAL
Brand: JOURNEY

## 2021-07-13 DEVICE — Z  INACTIVE USE 2750024 CEMENT BONE 40GM W/ GENTMYCN HI VISC PALACOS R+G: Type: IMPLANTABLE DEVICE | Site: KNEE | Status: FUNCTIONAL

## 2021-07-13 DEVICE — JOURNEY II BCS FEMORAL OXINIUM                                    RIGHT SIZE 4
Type: IMPLANTABLE DEVICE | Site: KNEE | Status: FUNCTIONAL
Brand: JOURNEY

## 2021-07-13 DEVICE — JOURNEY 7.5 ROUND RESURF PAT 29MM STANDARD
Type: IMPLANTABLE DEVICE | Site: KNEE | Status: FUNCTIONAL
Brand: JOURNEY

## 2021-07-13 RX ORDER — ACETAMINOPHEN 650 MG/1
650 SUPPOSITORY RECTAL ONCE
Status: COMPLETED | OUTPATIENT
Start: 2021-07-13 | End: 2021-07-13

## 2021-07-13 RX ORDER — VANCOMYCIN HYDROCHLORIDE
1250 ONCE
Status: COMPLETED | OUTPATIENT
Start: 2021-07-13 | End: 2021-07-13

## 2021-07-13 RX ORDER — ROPIVACAINE HYDROCHLORIDE 2 MG/ML
INJECTION, SOLUTION EPIDURAL; INFILTRATION; PERINEURAL AS NEEDED
Status: DISCONTINUED | OUTPATIENT
Start: 2021-07-13 | End: 2021-07-13 | Stop reason: HOSPADM

## 2021-07-13 RX ORDER — ASPIRIN 81 MG/1
81 TABLET ORAL EVERY 12 HOURS
Status: DISCONTINUED | OUTPATIENT
Start: 2021-07-13 | End: 2021-07-14 | Stop reason: HOSPADM

## 2021-07-13 RX ORDER — OXYCODONE HYDROCHLORIDE 5 MG/1
5 TABLET ORAL
Status: DISCONTINUED | OUTPATIENT
Start: 2021-07-13 | End: 2021-07-14 | Stop reason: HOSPADM

## 2021-07-13 RX ORDER — FENTANYL CITRATE 50 UG/ML
100 INJECTION, SOLUTION INTRAMUSCULAR; INTRAVENOUS ONCE
Status: COMPLETED | OUTPATIENT
Start: 2021-07-13 | End: 2021-07-13

## 2021-07-13 RX ORDER — ACETAMINOPHEN 325 MG/1
975 TABLET ORAL ONCE
Status: COMPLETED | OUTPATIENT
Start: 2021-07-13 | End: 2021-07-13

## 2021-07-13 RX ORDER — OXYCODONE HYDROCHLORIDE 5 MG/1
5 TABLET ORAL
Status: DISCONTINUED | OUTPATIENT
Start: 2021-07-13 | End: 2021-07-13 | Stop reason: HOSPADM

## 2021-07-13 RX ORDER — NALOXONE HYDROCHLORIDE 0.4 MG/ML
.2-.4 INJECTION, SOLUTION INTRAMUSCULAR; INTRAVENOUS; SUBCUTANEOUS
Status: DISCONTINUED | OUTPATIENT
Start: 2021-07-13 | End: 2021-07-14 | Stop reason: HOSPADM

## 2021-07-13 RX ORDER — MIDAZOLAM HYDROCHLORIDE 1 MG/ML
2 INJECTION, SOLUTION INTRAMUSCULAR; INTRAVENOUS ONCE
Status: COMPLETED | OUTPATIENT
Start: 2021-07-13 | End: 2021-07-13

## 2021-07-13 RX ORDER — OXYCODONE HYDROCHLORIDE 5 MG/1
10 TABLET ORAL
Status: DISCONTINUED | OUTPATIENT
Start: 2021-07-13 | End: 2021-07-14 | Stop reason: HOSPADM

## 2021-07-13 RX ORDER — SODIUM CHLORIDE 0.9 % (FLUSH) 0.9 %
5-40 SYRINGE (ML) INJECTION AS NEEDED
Status: DISCONTINUED | OUTPATIENT
Start: 2021-07-13 | End: 2021-07-14 | Stop reason: HOSPADM

## 2021-07-13 RX ORDER — SODIUM CHLORIDE 9 MG/ML
100 INJECTION, SOLUTION INTRAVENOUS CONTINUOUS
Status: DISCONTINUED | OUTPATIENT
Start: 2021-07-13 | End: 2021-07-14 | Stop reason: HOSPADM

## 2021-07-13 RX ORDER — PROPOFOL 10 MG/ML
INJECTION, EMULSION INTRAVENOUS AS NEEDED
Status: DISCONTINUED | OUTPATIENT
Start: 2021-07-13 | End: 2021-07-13 | Stop reason: HOSPADM

## 2021-07-13 RX ORDER — OXYCODONE AND ACETAMINOPHEN 10; 325 MG/1; MG/1
1 TABLET ORAL AS NEEDED
Status: DISCONTINUED | OUTPATIENT
Start: 2021-07-13 | End: 2021-07-13 | Stop reason: HOSPADM

## 2021-07-13 RX ORDER — HYDROMORPHONE HYDROCHLORIDE 2 MG/ML
0.5 INJECTION, SOLUTION INTRAMUSCULAR; INTRAVENOUS; SUBCUTANEOUS
Status: DISCONTINUED | OUTPATIENT
Start: 2021-07-13 | End: 2021-07-13 | Stop reason: HOSPADM

## 2021-07-13 RX ORDER — TRANEXAMIC ACID 100 MG/ML
INJECTION, SOLUTION INTRAVENOUS AS NEEDED
Status: DISCONTINUED | OUTPATIENT
Start: 2021-07-13 | End: 2021-07-13 | Stop reason: HOSPADM

## 2021-07-13 RX ORDER — CELECOXIB 100 MG/1
200 CAPSULE ORAL EVERY 12 HOURS
Status: DISCONTINUED | OUTPATIENT
Start: 2021-07-13 | End: 2021-07-13

## 2021-07-13 RX ORDER — CELECOXIB 100 MG/1
200 CAPSULE ORAL DAILY
Status: DISCONTINUED | OUTPATIENT
Start: 2021-07-13 | End: 2021-07-14 | Stop reason: HOSPADM

## 2021-07-13 RX ORDER — CEFAZOLIN SODIUM/WATER 2 G/20 ML
2 SYRINGE (ML) INTRAVENOUS
Status: COMPLETED | OUTPATIENT
Start: 2021-07-13 | End: 2021-07-13

## 2021-07-13 RX ORDER — ACETAMINOPHEN 500 MG
1000 TABLET ORAL EVERY 6 HOURS
Status: DISCONTINUED | OUTPATIENT
Start: 2021-07-14 | End: 2021-07-14 | Stop reason: HOSPADM

## 2021-07-13 RX ORDER — SODIUM CHLORIDE 0.9 % (FLUSH) 0.9 %
5-40 SYRINGE (ML) INJECTION AS NEEDED
Status: DISCONTINUED | OUTPATIENT
Start: 2021-07-13 | End: 2021-07-13 | Stop reason: HOSPADM

## 2021-07-13 RX ORDER — SODIUM CHLORIDE, SODIUM LACTATE, POTASSIUM CHLORIDE, CALCIUM CHLORIDE 600; 310; 30; 20 MG/100ML; MG/100ML; MG/100ML; MG/100ML
75 INJECTION, SOLUTION INTRAVENOUS CONTINUOUS
Status: DISCONTINUED | OUTPATIENT
Start: 2021-07-13 | End: 2021-07-13 | Stop reason: HOSPADM

## 2021-07-13 RX ORDER — BUPIVACAINE HYDROCHLORIDE 7.5 MG/ML
INJECTION, SOLUTION INTRASPINAL
Status: COMPLETED | OUTPATIENT
Start: 2021-07-13 | End: 2021-07-13

## 2021-07-13 RX ORDER — VANCOMYCIN HYDROCHLORIDE 1 G/20ML
INJECTION, POWDER, LYOPHILIZED, FOR SOLUTION INTRAVENOUS AS NEEDED
Status: DISCONTINUED | OUTPATIENT
Start: 2021-07-13 | End: 2021-07-13 | Stop reason: HOSPADM

## 2021-07-13 RX ORDER — AMOXICILLIN 250 MG
2 CAPSULE ORAL DAILY
Status: DISCONTINUED | OUTPATIENT
Start: 2021-07-14 | End: 2021-07-14 | Stop reason: HOSPADM

## 2021-07-13 RX ORDER — PROPOFOL 10 MG/ML
INJECTION, EMULSION INTRAVENOUS
Status: DISCONTINUED | OUTPATIENT
Start: 2021-07-13 | End: 2021-07-13 | Stop reason: HOSPADM

## 2021-07-13 RX ORDER — SODIUM CHLORIDE 0.9 % (FLUSH) 0.9 %
5-40 SYRINGE (ML) INJECTION EVERY 8 HOURS
Status: DISCONTINUED | OUTPATIENT
Start: 2021-07-13 | End: 2021-07-14 | Stop reason: HOSPADM

## 2021-07-13 RX ORDER — HYDROMORPHONE HYDROCHLORIDE 1 MG/ML
1 INJECTION, SOLUTION INTRAMUSCULAR; INTRAVENOUS; SUBCUTANEOUS
Status: DISCONTINUED | OUTPATIENT
Start: 2021-07-13 | End: 2021-07-14 | Stop reason: HOSPADM

## 2021-07-13 RX ORDER — SODIUM CHLORIDE 0.9 % (FLUSH) 0.9 %
5-40 SYRINGE (ML) INJECTION EVERY 8 HOURS
Status: DISCONTINUED | OUTPATIENT
Start: 2021-07-13 | End: 2021-07-13 | Stop reason: HOSPADM

## 2021-07-13 RX ORDER — KETOROLAC TROMETHAMINE 30 MG/ML
INJECTION, SOLUTION INTRAMUSCULAR; INTRAVENOUS AS NEEDED
Status: DISCONTINUED | OUTPATIENT
Start: 2021-07-13 | End: 2021-07-13 | Stop reason: HOSPADM

## 2021-07-13 RX ORDER — DEXAMETHASONE SODIUM PHOSPHATE 100 MG/10ML
10 INJECTION INTRAMUSCULAR; INTRAVENOUS ONCE
Status: COMPLETED | OUTPATIENT
Start: 2021-07-14 | End: 2021-07-14

## 2021-07-13 RX ORDER — DIPHENHYDRAMINE HCL 25 MG
25 CAPSULE ORAL
Status: DISCONTINUED | OUTPATIENT
Start: 2021-07-13 | End: 2021-07-14 | Stop reason: HOSPADM

## 2021-07-13 RX ORDER — ACETAMINOPHEN 500 MG
1000 TABLET ORAL ONCE
Status: COMPLETED | OUTPATIENT
Start: 2021-07-13 | End: 2021-07-13

## 2021-07-13 RX ADMIN — PHENYLEPHRINE HYDROCHLORIDE 120 MCG: 10 INJECTION INTRAVENOUS at 09:20

## 2021-07-13 RX ADMIN — Medication 3 AMPULE: at 06:46

## 2021-07-13 RX ADMIN — TRANEXAMIC ACID 1000 MG: 100 INJECTION, SOLUTION INTRAVENOUS at 09:53

## 2021-07-13 RX ADMIN — VANCOMYCIN HYDROCHLORIDE 1250 MG: 10 INJECTION, POWDER, LYOPHILIZED, FOR SOLUTION INTRAVENOUS at 22:21

## 2021-07-13 RX ADMIN — MIDAZOLAM 1 MG: 1 INJECTION INTRAMUSCULAR; INTRAVENOUS at 07:14

## 2021-07-13 RX ADMIN — SODIUM CHLORIDE, SODIUM LACTATE, POTASSIUM CHLORIDE, AND CALCIUM CHLORIDE 75 ML/HR: 600; 310; 30; 20 INJECTION, SOLUTION INTRAVENOUS at 07:29

## 2021-07-13 RX ADMIN — ACETAMINOPHEN 975 MG: 325 TABLET ORAL at 16:40

## 2021-07-13 RX ADMIN — TRANEXAMIC ACID 1000 MG: 100 INJECTION, SOLUTION INTRAVENOUS at 08:43

## 2021-07-13 RX ADMIN — CELECOXIB 200 MG: 100 CAPSULE ORAL at 22:23

## 2021-07-13 RX ADMIN — Medication 10 ML: at 22:32

## 2021-07-13 RX ADMIN — PHENYLEPHRINE HYDROCHLORIDE 120 MCG: 10 INJECTION INTRAVENOUS at 09:50

## 2021-07-13 RX ADMIN — ACETAMINOPHEN 1000 MG: 500 TABLET, FILM COATED ORAL at 23:53

## 2021-07-13 RX ADMIN — ASPIRIN 81 MG: 81 TABLET ORAL at 22:22

## 2021-07-13 RX ADMIN — HYDROMORPHONE HYDROCHLORIDE 1 MG: 1 INJECTION, SOLUTION INTRAMUSCULAR; INTRAVENOUS; SUBCUTANEOUS at 23:54

## 2021-07-13 RX ADMIN — PHENYLEPHRINE HYDROCHLORIDE 120 MCG: 10 INJECTION INTRAVENOUS at 09:30

## 2021-07-13 RX ADMIN — OXYCODONE 5 MG: 5 TABLET ORAL at 22:22

## 2021-07-13 RX ADMIN — Medication 1 AMPULE: at 22:22

## 2021-07-13 RX ADMIN — PROPOFOL 120 MCG/KG/MIN: 10 INJECTION, EMULSION INTRAVENOUS at 08:27

## 2021-07-13 RX ADMIN — VANCOMYCIN HYDROCHLORIDE 1250 MG: 10 INJECTION, POWDER, LYOPHILIZED, FOR SOLUTION INTRAVENOUS at 07:20

## 2021-07-13 RX ADMIN — FENTANYL CITRATE 100 MCG: 50 INJECTION, SOLUTION INTRAMUSCULAR; INTRAVENOUS at 07:14

## 2021-07-13 RX ADMIN — PROPOFOL 50 MG: 10 INJECTION, EMULSION INTRAVENOUS at 08:27

## 2021-07-13 RX ADMIN — SODIUM CHLORIDE, SODIUM LACTATE, POTASSIUM CHLORIDE, AND CALCIUM CHLORIDE: 600; 310; 30; 20 INJECTION, SOLUTION INTRAVENOUS at 08:52

## 2021-07-13 RX ADMIN — PHENYLEPHRINE HYDROCHLORIDE 120 MCG: 10 INJECTION INTRAVENOUS at 09:42

## 2021-07-13 RX ADMIN — PHENYLEPHRINE HYDROCHLORIDE 120 MCG: 10 INJECTION INTRAVENOUS at 08:58

## 2021-07-13 RX ADMIN — SODIUM CHLORIDE 100 ML/HR: 900 INJECTION, SOLUTION INTRAVENOUS at 22:28

## 2021-07-13 RX ADMIN — PHENYLEPHRINE HYDROCHLORIDE 120 MCG: 10 INJECTION INTRAVENOUS at 09:13

## 2021-07-13 RX ADMIN — CEFAZOLIN 2 G: 1 INJECTION, POWDER, FOR SOLUTION INTRAVENOUS at 08:35

## 2021-07-13 RX ADMIN — BUPIVACAINE HYDROCHLORIDE IN DEXTROSE 10 MG: 7.5 INJECTION, SOLUTION SUBARACHNOID at 08:24

## 2021-07-13 RX ADMIN — PHENYLEPHRINE HYDROCHLORIDE 120 MCG: 10 INJECTION INTRAVENOUS at 09:05

## 2021-07-13 RX ADMIN — PHENYLEPHRINE HYDROCHLORIDE 60 MCG: 10 INJECTION INTRAVENOUS at 08:50

## 2021-07-13 RX ADMIN — ACETAMINOPHEN 1000 MG: 500 TABLET ORAL at 06:45

## 2021-07-13 NOTE — ANESTHESIA POSTPROCEDURE EVALUATION
Procedure(s):  RIGHT KNEE ARTHROPLASTY TOTAL / Delia Hunter AND MAVIS.    spinal    Anesthesia Post Evaluation      Multimodal analgesia: multimodal analgesia used between 6 hours prior to anesthesia start to PACU discharge  Patient location during evaluation: PACU  Patient participation: complete - patient participated  Level of consciousness: awake  Pain management: adequate  Airway patency: patent  Anesthetic complications: no  Cardiovascular status: acceptable  Respiratory status: acceptable  Hydration status: acceptable  Post anesthesia nausea and vomiting:  none      INITIAL Post-op Vital signs:   Vitals Value Taken Time   /84 07/13/21 1251   Temp 36.5 °C (97.7 °F) 07/13/21 1106   Pulse 65 07/13/21 1309   Resp 16 07/13/21 1230   SpO2 96 % 07/13/21 1309   Vitals shown include unvalidated device data.

## 2021-07-13 NOTE — OP NOTES
[facility]  OPERATIVE REPORT    Name:  Deonna Cardona  MR#:  [id number]  :  1960  ACCOUNT #:  [de-identified]  DATE OF SERVICE:  2021    PREOPERATIVE DIAGNOSIS:  Right knee arthritis. POSTOPERATIVE DIAGNOSIS:  Right knee arthritis. PROCEDURE PERFORMED:  Right total knee arthroplasty. SURGEON:  Padma Leyva MD    ASSISTANT:  None    ANESTHESIA:  Spinal.    COMPLICATIONS:  None. SPECIMENS REMOVED:  Resected bone for routine disposal.    IMPLANTS:  Smith and Nephew size 4 Oxinium posterior stabilized Journey II femur, size 3 right tibial baseplate, 29 mm x 8.3-NS patella polyethylene, and 11-mm size 3.4 articular polyethylene insert. ESTIMATED BLOOD LOSS:  About 100 mL. MEDICATIONS:  Tranexamic acid IV protocol, vancomycin and Ancef preoperative antibiotics, vancomycin intraarticular powder, and Naropin periarticular cocktail. PROCEDURE:  After discussion of the risks, benefits and alternatives, the patient expressed understanding and strongly desired to proceed with surgical treatment. She was brought to the operating room, and after verification of the patient's site, side, diagnosis and procedure, spinal anesthesia was administered. The right lower extremity was prepped and draped in normal sterile fashion. Time-out was then performed. A mid vastus approach was performed. Severe tricompartmental osteoarthritis was demonstrated. Intramedullary alignment was used to perform the distal femoral resection. Extramedullary alignment was used to perform the tibial resection. Extension gap balancing demonstrated appropriate alignment and balance. Flexion gap balancing was used to set femoral rotation and size of the distal femur. Appropriate soft tissue releases and resections were performed. A size 4 femur was selected. Anterior, posterior and chamfer cuts were performed for the size 4.   Trialing demonstrated excellent stability, alignment, balance, tracking and range of motion. Tibial rotation was marked and the tibial fin punch was performed. Patellar resection was performed. Patellar tracking was noted to be excellent. Preparation was performed for a 29-mm patella. The lateral patellar facet was beveled. Tourniquet was inflated and the cut bony surfaces were copiously irrigated and meticulously dried. Cement was mixed on the back table and used to coat the bone-implant interface as a size 3 right tibia, size 4 posterior-stabilized Oxinium femur, and 29 mm x 7.5-mm thickness patella polyethylene were placed. Extruded cement was removed from the operative field at each stage of implantation. The knee was held in extension with a trial polyethylene as the cement hardened. Once the cement had hardened, examination demonstrated that optimum stability, balance, range of motion and tracking were obtained with an 11-mm poly. The trial polyethylene was removed. A permanent 11-mm polyethylene was placed. Prior to that, periarticular injection with Naropin cocktail was performed. The wound was copiously irrigated at length including a dilute Betadine solution. A layered wound closure was completed. The periarticular soft tissues were treated with a radiofrequency device prior to wound closure and intermittently during the procedure. After cosmetic skin closure, sterile dressings were applied. Blood loss was about 100 mL. There were no complications. No specimens were sent to Pathology. POSTOPERATIVE PLAN:  Weightbear as tolerated. Standard postoperative antibiotic and DVT prophylaxis.       Telly Billings MD      [id number]  D:  07/13/2021 10:10  T:  07/13/2021 12:38  JOB #:  [id number]

## 2021-07-13 NOTE — PROGRESS NOTES
Problem: Falls - Risk of  Goal: *Absence of Falls  Description: Document Michaela Hendricks Fall Risk and appropriate interventions in the flowsheet.   7/13/2021 1546 by Pratima Guadalupe RN  Outcome: Progressing Towards Goal  Note: Fall Risk Interventions:            Medication Interventions: Patient to call before getting OOB, Teach patient to arise slowly    Elimination Interventions: Bed/chair exit alarm, Call light in reach           7/13/2021 1542 by Anayeli Peterson RN  Outcome: Progressing Towards Goal  Note: Fall Risk Interventions:            Medication Interventions: Patient to call before getting OOB, Teach patient to arise slowly    Elimination Interventions: Bed/chair exit alarm, Call light in reach              Problem: Patient Education: Go to Patient Education Activity  Goal: Patient/Family Education  7/13/2021 1546 by Tania Braun RN  Outcome: Progressing Towards Goal  7/13/2021 1542 by Anayeli Peterson RN  Outcome: Progressing Towards Goal     Problem: Pain  Goal: *Control of Pain  Outcome: Progressing Towards Goal  Goal: *PALLIATIVE CARE:  Alleviation of Pain  Outcome: Progressing Towards Goal     Problem: Patient Education: Go to Patient Education Activity  Goal: Patient/Family Education  Outcome: Progressing Towards Goal     Problem: Constipation - Risk of  Goal: *Prevention of constipation  Outcome: Progressing Towards Goal     Problem: Patient Education: Go to Patient Education Activity  Goal: Patient/Family Education  Outcome: Progressing Towards Goal     Problem: Knee Replacement: Day of Surgery/Unit  Goal: Activity/Safety  Outcome: Progressing Towards Goal  Goal: Consults, if ordered  Outcome: Progressing Towards Goal  Goal: Diagnostic Test/Procedures  Outcome: Progressing Towards Goal  Goal: Nutrition/Diet  Outcome: Progressing Towards Goal  Goal: Medications  Outcome: Progressing Towards Goal  Goal: Respiratory  Outcome: Progressing Towards Goal  Goal: Treatments/Interventions/Procedures  Outcome: Progressing Towards Goal  Goal: Psychosocial  Outcome: Progressing Towards Goal  Goal: *Initiate mobility  Outcome: Progressing Towards Goal  Goal: *Optimal pain control at patient's stated goal  Outcome: Progressing Towards Goal  Goal: *Hemodynamically stable  Outcome: Progressing Towards Goal     Problem: Knee Replacement: Post-Op Day 1  Goal: Activity/Safety  Outcome: Progressing Towards Goal  Goal: Diagnostic Test/Procedures  Outcome: Progressing Towards Goal  Goal: Nutrition/Diet  Outcome: Progressing Towards Goal  Goal: Medications  Outcome: Progressing Towards Goal  Goal: Respiratory  Outcome: Progressing Towards Goal  Goal: Treatments/Interventions/Procedures  Outcome: Progressing Towards Goal  Goal: Psychosocial  Outcome: Progressing Towards Goal  Goal: Discharge Planning  Outcome: Progressing Towards Goal  Goal: *Demonstrates progressive activity  Outcome: Progressing Towards Goal  Goal: *Optimal pain control at patient's stated goal  Outcome: Progressing Towards Goal  Goal: *Hemodynamically stable  Outcome: Progressing Towards Goal  Goal: *Discharge plan identified  Outcome: Progressing Towards Goal     Problem: Knee Replacement: Post-Op Day 2  Goal: Activity/Safety  Outcome: Progressing Towards Goal  Goal: Diagnostic Test/Procedures  Outcome: Progressing Towards Goal  Goal: Medications  Outcome: Progressing Towards Goal  Goal: Respiratory  Outcome: Progressing Towards Goal  Goal: Treatments/Interventions/Procedures  Outcome: Progressing Towards Goal  Goal: Psychosocial  Outcome: Progressing Towards Goal  Goal: *Met physical therapy criteria for discharge to the next level of care  Outcome: Progressing Towards Goal  Goal: *Optimal pain control with oral analgesia  Outcome: Progressing Towards Goal  Goal: *Hemodynamically stable  Outcome: Progressing Towards Goal  Goal: *Tolerating diet  Outcome: Progressing Towards Goal  Goal: *Patient verbalizes understanding of discharge instructions  Outcome: Progressing Towards Goal

## 2021-07-13 NOTE — ANESTHESIA PROCEDURE NOTES
Peripheral Block    Start time: 7/13/2021 7:14 AM  End time: 7/13/2021 7:17 AM  Performed by: Katelin Elias MD  Authorized by: Katelin Elias MD       Pre-procedure: Indications: at surgeon's request and post-op pain management    Preanesthetic Checklist: patient identified, risks and benefits discussed, site marked, timeout performed, anesthesia consent given and patient being monitored    Timeout Time: 07:14 EDT          Block Type:   Block Type:   Adductor canal block  Laterality:  Right  Monitoring:  Standard ASA monitoring, continuous pulse ox, frequent vital sign checks, heart rate, oxygen and responsive to questions  Procedures: ultrasound guided    Patient Position: supine  Prep: chlorhexidine    Location:  Mid thigh  Needle Type:  Stimuplex  Needle Gauge:  20 G  Needle Localization:  Anatomical landmarks and ultrasound guidance  Med Admin Time: 7/13/2021 7:14 AM    Assessment:  Number of attempts:  1  Injection Assessment:  Incremental injection every 5 mL, local visualized surrounding nerve on ultrasound, negative aspiration for blood, no paresthesia, no intravascular symptoms and ultrasound image on chart  Patient tolerance:  Patient tolerated the procedure well with no immediate complications  20 cc's of 0.25% ropivacaine administered for nerve block

## 2021-07-13 NOTE — PROGRESS NOTES
Update- No interval change. No sign of active cardiac or respiratory disease. Site marked.  To OR today for Procedure(s) (LRB):  RIGHT KNEE ARTHROPLASTY TOTAL / Janet Derrick AND NEPHEW (Right)

## 2021-07-13 NOTE — ADDENDUM NOTE
Addendum  created 07/13/21 1459 by Chantal Griffin CRNA    Flowsheet accepted, Intraprocedure Flowsheets edited

## 2021-07-13 NOTE — ANESTHESIA PROCEDURE NOTES
Spinal Block    Start time: 7/13/2021 8:20 AM  End time: 7/13/2021 8:24 AM  Performed by: Madai Bean MD  Authorized by: Madai Bean MD     Pre-procedure: Indications: primary anesthetic  Preanesthetic Checklist: patient identified, risks and benefits discussed, anesthesia consent, site marked, patient being monitored and timeout performed    Timeout Time: 08:20 EDT          Spinal Block:   Patient Position:  Seated  Prep Region:  Lumbar  Prep: chlorhexidine and patient draped      Location:  L2-3  Technique:  Single shot        Needle:   Needle Type:  Pencan  Needle Gauge:  25 G  Attempts:  2      Events: CSF confirmed, no blood with aspiration and no paresthesia        Assessment:  Insertion:  Uncomplicated  Patient tolerance:  Patient tolerated the procedure well with no immediate complications  Failed attempt at apparent L3-4.  Success at apparent L2-3 (midline approach)

## 2021-07-13 NOTE — PROGRESS NOTES
07/13/21 1500   Musculoskeletal   RLE Surgery; Swelling;Weakness   Dual Skin Pressure Injury Assessment   Dual Skin Pressure Injury Assessment WDL   Second Care Provider (Based on [address] [place] [address]) Leigha Conklin RN   Skin Integumentary   Skin Integumentary (WDL) X    Pressure  Injury Documentation No Pressure Injury Noted-Pressure Ulcer Prevention Initiated   Skin Color Appropriate for ethnicity   Skin Condition/Temp Dry; Warm   Skin Integrity Incision (comment)  (RLE)   Wound Prevention and Protection Methods   Orientation of Wound Prevention Posterior   Location of Wound Prevention Sacrum/Coccyx   Dressing Present  No   Wound Offloading (Prevention Methods) Bed, pressure redistribution/air;Pillows;Repositioning

## 2021-07-13 NOTE — ANESTHESIA PREPROCEDURE EVALUATION
Relevant Problems   RESPIRATORY SYSTEM   (+) SOB (shortness of breath)      CARDIOVASCULAR   (+) Coronary artery disease due to lipid rich plaque   (+) Essential hypertension with goal blood pressure less than 130/85   (+) SVT (supraventricular tachycardia) (HCC)   (+) Unstable angina (HCC)       Anesthetic History     PONV          Review of Systems / Medical History  Patient summary reviewed and pertinent labs reviewed    Pulmonary                   Neuro/Psych              Cardiovascular    Hypertension        Dysrhythmias : SVT  CAD, cardiac stents and hyperlipidemia    Exercise tolerance: >4 METS     GI/Hepatic/Renal     GERD: well controlled           Endo/Other        Arthritis     Other Findings            Physical Exam    Airway  Mallampati: II  TM Distance: > 6 cm  Neck ROM: normal range of motion   Mouth opening: Normal     Cardiovascular    Rhythm: regular           Dental    Dentition: Full lower dentures and Lower partial plate     Pulmonary                 Abdominal  GI exam deferred       Other Findings            Anesthetic Plan    ASA: 3  Anesthesia type: spinal - femoral single shot      Post-op pain plan if not by surgeon: peripheral nerve block single    Induction: Intravenous  Anesthetic plan and risks discussed with: Patient

## 2021-07-14 VITALS
OXYGEN SATURATION: 93 % | BODY MASS INDEX: 31.89 KG/M2 | DIASTOLIC BLOOD PRESSURE: 76 MMHG | HEART RATE: 84 BPM | SYSTOLIC BLOOD PRESSURE: 138 MMHG | HEIGHT: 64 IN | RESPIRATION RATE: 17 BRPM | WEIGHT: 186.8 LBS | TEMPERATURE: 98.8 F

## 2021-07-14 LAB — HGB BLD-MCNC: 9.1 G/DL (ref 11.7–15.4)

## 2021-07-14 PROCEDURE — 97535 SELF CARE MNGMENT TRAINING: CPT

## 2021-07-14 PROCEDURE — 74011250637 HC RX REV CODE- 250/637: Performed by: ORTHOPAEDIC SURGERY

## 2021-07-14 PROCEDURE — 36415 COLL VENOUS BLD VENIPUNCTURE: CPT

## 2021-07-14 PROCEDURE — 74011250636 HC RX REV CODE- 250/636: Performed by: ORTHOPAEDIC SURGERY

## 2021-07-14 PROCEDURE — 85018 HEMOGLOBIN: CPT

## 2021-07-14 PROCEDURE — 2709999900 HC NON-CHARGEABLE SUPPLY

## 2021-07-14 PROCEDURE — 97530 THERAPEUTIC ACTIVITIES: CPT

## 2021-07-14 PROCEDURE — 97165 OT EVAL LOW COMPLEX 30 MIN: CPT

## 2021-07-14 PROCEDURE — 97161 PT EVAL LOW COMPLEX 20 MIN: CPT

## 2021-07-14 RX ORDER — OXYCODONE HYDROCHLORIDE 5 MG/1
5 TABLET ORAL ONCE
Status: COMPLETED | OUTPATIENT
Start: 2021-07-14 | End: 2021-07-14

## 2021-07-14 RX ORDER — ASPIRIN 81 MG/1
81 TABLET ORAL EVERY 12 HOURS
Qty: 60 TABLET | Refills: 0 | Status: SHIPPED | OUTPATIENT
Start: 2021-07-14 | End: 2022-03-29 | Stop reason: CLARIF

## 2021-07-14 RX ORDER — OXYCODONE HYDROCHLORIDE 5 MG/1
5 TABLET ORAL
Qty: 40 TABLET | Refills: 0 | Status: SHIPPED | OUTPATIENT
Start: 2021-07-14 | End: 2021-07-21

## 2021-07-14 RX ORDER — TRAZODONE HYDROCHLORIDE 50 MG/1
100 TABLET ORAL
Status: DISCONTINUED | OUTPATIENT
Start: 2021-07-14 | End: 2021-07-14 | Stop reason: HOSPADM

## 2021-07-14 RX ADMIN — ASPIRIN 81 MG: 81 TABLET ORAL at 08:22

## 2021-07-14 RX ADMIN — DEXAMETHASONE SODIUM PHOSPHATE 10 MG: 10 INJECTION INTRAMUSCULAR; INTRAVENOUS at 12:43

## 2021-07-14 RX ADMIN — OXYCODONE HYDROCHLORIDE 5 MG: 5 TABLET ORAL at 08:00

## 2021-07-14 RX ADMIN — SENNOSIDES AND DOCUSATE SODIUM 2 TABLET: 8.6; 5 TABLET ORAL at 08:22

## 2021-07-14 RX ADMIN — Medication 1 AMPULE: at 08:22

## 2021-07-14 RX ADMIN — HYDROMORPHONE HYDROCHLORIDE 1 MG: 1 INJECTION, SOLUTION INTRAMUSCULAR; INTRAVENOUS; SUBCUTANEOUS at 04:21

## 2021-07-14 RX ADMIN — Medication 10 ML: at 04:24

## 2021-07-14 RX ADMIN — ACETAMINOPHEN 1000 MG: 500 TABLET, FILM COATED ORAL at 11:07

## 2021-07-14 RX ADMIN — ACETAMINOPHEN 1000 MG: 500 TABLET, FILM COATED ORAL at 06:43

## 2021-07-14 RX ADMIN — Medication 10 ML: at 13:04

## 2021-07-14 RX ADMIN — OXYCODONE 10 MG: 5 TABLET ORAL at 11:09

## 2021-07-14 RX ADMIN — OXYCODONE 5 MG: 5 TABLET ORAL at 06:43

## 2021-07-14 NOTE — PROGRESS NOTES
CM consult received to assist with discharge planning. CM in to see patient who is alert and oriented. Demographics, PCP and insurance verified. Therapy recommendations for [facility] PT and rolling walker reviewed and patient is agreeable. List of in network home health reviewed and patient is agreeable to referral being sent to [facility]. Order placed for [facility] PT and Rolling walker and referrals sent. Rolling walker provided to patient and signed consent/acknowledgement faxed to [facility].  Patient's friend will transport her home. Care Management Interventions  PCP Verified by CM: Yes Bunny Bernard MD)  Mode of Transport at Discharge: Other (see comment) (Friend)  Transition of Care Consult (CM Consult): [address]: No  Reason Outside [place]:  (Patient Preference)  Discharge Durable Medical Equipment: Yes  Physical Therapy Consult: Yes  Occupational Therapy Consult: Yes  Speech Therapy Consult: No  Current Support Network: Own Home, Family Lives Nearby  Confirm Follow Up Transport: Friends  The Plan for Transition of Care is Related to the Following Treatment Goals : Patient will participate in [facility] therapies in order to return to baseline independence in ADLs.    The Patient and/or Patient Representative was Provided with a Choice of Provider and Agrees with the Discharge Plan?: Yes  Freedom of Choice List was Provided with Basic Dialogue that Supports the Patient's Individualized Plan of Care/Goals, Treatment Preferences and Shares the Quality Data Associated with the Providers?: Yes  Discharge Location  Discharge Placement: Home with home health

## 2021-07-14 NOTE — DISCHARGE INSTRUCTIONS
Patient Education        Learning About Total Knee Replacement Surgery  What is a total knee replacement? A total knee replacement replaces the worn ends of the thighbone (femur) and the lower leg bone (tibia) where they meet at the knee. Sometimes the surface of the patella (kneecap) is replaced too. You may want this surgery if you have knee pain, stiffness, swelling, or problems moving your knee that you cannot treat in other ways. For most people, these problems are caused by arthritis. They can also be caused by a knee injury. If you need to have both knees replaced, you may have both surgeries at the same time. Or your doctor may recommend doing one knee at a time. Your doctor would replace the second knee after you recover from the first knee surgery. Recovery after a double knee replacement takes longer than after a single replacement. How is a total knee replacement done? Before surgery, you will get medicine to make you sleep or feel relaxed. If you will be awake during surgery, you will also get a shot of medicine into your spine to make your legs numb. There are two types of replacement joints. They are:  · Cemented joints. The cement acts as glue, attaching the new joint to the bone. · Uncemented joints. These have a metal coating with many small openings. Over time, new bone grows and fills up the openings. This new bone attaches the joint to the bone. Your doctor may also use a combination of cemented and uncemented parts. Your doctor makes a 4- to 10-inch cut, called an incision, on the front of your knee. Your doctor then:  · Replaces the damaged part of your femur with a metal piece. · Replaces the damaged part of your tibia with a metal piece and plastic surface. · May replace part of your kneecap with plastic. The doctor finishes the surgery by closing your incision with stitches, staples, skin glue, or tape strips.   What can you expect as you recover from total knee replacement surgery? Your knee will be swollen and will hurt when you move it. You'll need to take pain medicine for a time after surgery. Most people will start to walk with a walker or crutches the day of surgery. You'll start rehabilitation (rehab) before you leave the hospital. Rehab will help you improve strength and movement in your knee. You may need some extra support or help at home for the first few weeks. After you recover, you should be able to do activities such as go for walks, dance, and ride a bike on flat ground. Talk to your doctor about whether you can do more strenuous activities. Follow-up care is a key part of your treatment and safety. Be sure to make and go to all appointments, and call your doctor if you are having problems. It's also a good idea to know your test results and keep a list of the medicines you take. Where can you learn more? Go to http://www.gray.com/  Enter A781 in the search box to learn more about \"Learning About Total Knee Replacement Surgery. \"  Current as of: November 16, 2020               Content Version: 12.8  © 4359-9793 [employer]. Care instructions adapted under license by [employer] (which disclaims liability or warranty for this information). If you have questions about a medical condition or this instruction, always ask your healthcare professional. [address] any warranty or liability for your use of this information.

## 2021-07-14 NOTE — PROGRESS NOTES
ACUTE OT GOALS:  (Developed with and agreed upon by patient and/or caregiver.)  1. Patient will complete lower body bathing and dressing with modified independence and adaptive equipment as needed. 2. Patient will complete functional transfers with modified independence and adaptive equipment as needed. 3. Patient will complete toileting with modified independence. 4. Patient will tolerate at least 20 minutes of BUE therapeutic exercises to strengthen BUE to aid in functional transfers. 5. Patient will complete functional mobility of household distances with modified independence and adaptive equipment as needed. 6. Patient will complete grooming at sink with modified independence.      Timeframe: 7 visits       OCCUPATIONAL THERAPY ASSESSMENT: Initial Assessment and Daily Note OT Treatment Day # 1    Libra Awan is a 62 y.o. female   PRIMARY DIAGNOSIS: <principal problem not specified>  Arthritis of right knee [M17.11]  Procedure(s) (LRB):  RIGHT KNEE ARTHROPLASTY TOTAL / [facility] AND NEPHEW (Right)  1 Day Post-Op  Reason for Referral:  Post op TKA   ICD-10: Treatment Diagnosis: Pain in Right Knee (M25.561)  Stiffness of Right Knee, Not elsewhere classified (M25.661)  INPATIENT: Payor: BLUE CROSS / [place] BLUE ESSENTIALS EVELYNE / Product Type: EVELYNE /   ASSESSMENT:     REHAB RECOMMENDATIONS:   Recommendation to date pending progress:  Setting:   No further skilled therapy   Equipment:    Rolling Walker     PRIOR LEVEL OF FUNCTION:  (Prior to Hospitalization)  INITIAL/CURRENT LEVEL OF FUNCTION:  (Based on today's evaluation)   Bathing:   Independent  Dressing:   Independent  Feeding/Grooming:   Independent  Toileting:   Independent  Functional Mobility:   Independent Bathing:   Supervision  Dressing:   Set Up  Feeding/Grooming:   Set Up  Toileting:   Supervision  Functional Mobility:   Standby Assistance     ASSESSMENT:  Ms. Get Harris is a 62year old female who is s/p R TKA and WBAT. At baseline she lives alone and is independent with all ADLs/ IADLs and working. Today, reports pain 7/10. Demonstrates functional mobility with SBA and RW, then seated ADLs with setup. She is functioning below her baseline and would benefit from continued OT while hospitalized, but I expect she will progress well enough to not needing HHOT at home. SUBJECTIVE:   Ms. Atrhur Greenfield states, \"I'm used to doing everything. \"    SOCIAL HISTORY/LIVING ENVIRONMENT: Lives alone, independent with ADLs and IADLs. Works a job taht requires a lot of standing and walking. Her children live next door and can help as needed.         OBJECTIVE:     PAIN: VITAL SIGNS: LINES/DRAINS:   Pre Treatment: Pain Screen  Pain Scale 1: Numeric (0 - 10)  Pain Intensity 1: 7  Pain Location 1: Knee  Pain Intervention(s) 1: Ice  Post Treatment:7/10   IV and Purewick  O2 Device: None (Room air)     GROSS EVALUATION:  BUE Within Functional Limits Abnormal/ Functional Abnormal/ Non-Functional (see comments) Not Tested Comments:   AROM [x] [] [] []    PROM [] [] [] [x]    Strength [x] [] [] []    Balance [] [x] [] []    Posture [x] [] [] []    Sensation [] [] [] [x]    Coordination [] [] [] [x]    Tone [] [] [] [x]    Edema [] [] [] [x]    Activity Tolerance [x] [] [] []     [] [] [] []      COGNITION/  PERCEPTION: Intact Impaired   (see comments) Comments:   Orientation [x] []    Vision [x] []    Hearing [x] []    Judgment/ Insight [x] []    Attention [x] []    Memory [x] []    Command Following [x] []    Emotional Regulation [x] []     [] []      ACTIVITIES OF DAILY LIVING: I Mod I S SBA CGA Min Mod Max Total NT Comments   BASIC ADLs:              Bathing/ Showering [] [] [x] [x] [] [] [] [] [] [] Setup- upper & lower body in sitting  SBA- lower body in standing    Toileting [] [] [x] [] [] [] [] [] [] []    Dressing [] [] [x] [x] [] [] [] [] [] [] Setup- upper body in sitting  SBA- lower body in stasnding; adaptive technique training    Feeding [] [] [] [] [] [] [] [] [] [x]    Grooming [] [] [x] [] [] [] [] [] [] [] Combing hair, brushing teeth in sitting in chair    Personal Device Care [x] [] [] [] [] [] [] [] [] []    Functional Mobility [] [] [] [x] [] [] [] [] [] [] RW   I=Independent, Mod I=Modified Independent, S=Supervision, SBA=Standby Assistance, CGA=Contact Guard Assistance,   Min=Minimal Assistance, Mod=Moderate Assistance, Max=Maximal Assistance, Total=Total Assistance, NT=Not Tested    MOBILITY: I Mod I S SBA CGA Min Mod Max Total  NT x2 Comments:   Supine to sit [] [] [] [x] [] [] [] [] [] [] []    Sit to supine [] [] [] [] [] [] [] [] [] [x] []    Sit to stand [] [] [] [x] [] [] [] [] [] [] []    Bed to chair [] [] [] [] [x] [] [] [] [] [] [] RW   I=Independent, Mod I=Modified Independent, S=Supervision, SBA=Standby Assistance, CGA=Contact Guard Assistance,   Min=Minimal Assistance, Mod=Moderate Assistance, Max=Maximal Assistance, Total=Total Assistance, NT=Not [facility] 6 Clicks   Daily Activity Inpatient Short Form        How much help from another person does the patient currently need. .. Total A Lot A Little None   1. Putting on and taking off regular lower body clothing? [] 1   [] 2   [x] 3   [] 4   2. Bathing (including washing, rinsing, drying)? [] 1   [] 2   [x] 3   [] 4   3. Toileting, which includes using toilet, bedpan or urinal?   [] 1   [] 2   [x] 3   [] 4   4. Putting on and taking off regular upper body clothing? [] 1   [] 2   [x] 3   [] 4   5. Taking care of personal grooming such as brushing teeth? [] 1   [] 2   [] 3   [x] 4   6. Eating meals? [] 1   [] 2   [] 3   [x] 4   © 2007, Trustees of [address] [place] [address] [address] [address], under license to [employer]. All rights reserved     Score:  Initial: 20 Most Recent: X (Date: -- )   Interpretation of Tool:  Represents activities that are increasingly more difficult (i.e. Bed mobility, Transfers, Gait).     PLAN:   FREQUENCY/DURATION: OT Plan of Care: 4 times/week for duration of hospital stay or until stated goals are met, whichever comes first.    PROBLEM LIST:   (Skilled intervention is medically necessary to address:)  1. Decreased ADL/Functional Activities  2. Decreased Activity Tolerance  3. Decreased AROM/PROM  4. Decreased Balance  5. Increased Pain   INTERVENTIONS PLANNED:   (Benefits and precautions of occupational therapy have been discussed with the patient.)  1. Self Care Training  2. Therapeutic Activity  3. Therapeutic Exercise/HEP  4. Neuromuscular Re-education  5. Education     TREATMENT:     EVALUATION: Low Complexity : (Untimed Charge)    TREATMENT:   ($$ Self Care/Home Management: 23-37 mins    )  Self Care (30 Minutes): Self care including Upper Body Bathing, Lower Body Bathing, Toileting, Upper Body Dressing, Lower Body Dressing, Grooming, ADL Adaptive Equipment Training and toilet transfer/ bathroom mobility to increase independence and safety.     TREATMENT GRID:  N/A    AFTER TREATMENT POSITION/PRECAUTIONS:  Chair, Needs within reach and RN notified    INTERDISCIPLINARY COLLABORATION:  RN/PCT and OT/BAILEY    TOTAL TREATMENT DURATION:  OT Patient Time In/Time Out  Time In: 0912  Time Out: TRISTIAN Whtie/MARIO

## 2021-07-14 NOTE — ADDENDUM NOTE
Addendum  created 07/14/21 9740 by Evonne Garg CRNA    Flowsheet accepted, Intraprocedure Flowsheets edited

## 2021-07-14 NOTE — PROGRESS NOTES
ACUTE PHYSICAL THERAPY GOALS:  (Developed with and agreed upon by patient and/or caregiver.)    1. Patient will perform bed mobility with MODIFIED INDEPENDENCE within 7 days. 2. Patient will transfer bed to chair with MODIFIED INDEPENDENCE within 7 days. 3. Patient will demonstrate FAIR+ DYNAMIC STANDING balance within 7 day(s). 4. Patient will ambulate 150ft+ using least restrictive assistive device and MODIFIED INDEPENDENCE within 7 days. 5. Patient will tolerate 25+ minutes of therapeutic activity/exercise and/or neuromuscular re-education while maintaining stable vitals to improve functional strength and activity tolerance within 7 days. PHYSICAL THERAPY JOINT CAMP: TOTAL KNEE ARTHROPLASTY  Initial Assessment, Daily Note and AM PT Treatment Day # 1      Libra Awan is a 62 y.o. female   PRIMARY DIAGNOSIS:   <principal problem not specified>  Arthritis of right knee [M17.11]  Procedure(s) and Anesthesia Type:     * RIGHT KNEE ARTHROPLASTY TOTAL / [facility] AND NEPHEW - Spinal (Right)  Procedure(s) (LRB):  RIGHT KNEE ARTHROPLASTY TOTAL / [facility] NEPHEW (Right)  1 Day Post-Op    Reason for Referral:    ICD-10: Treatment Diagnosis: Pain in Right Knee (M25.561)  Difficulty in walking, Not elsewhere classified (R26.2)  INPATIENT: Payor: BLUE CROSS / [place] BLUE ESSENTIALS EVELYNE / Product Type: EVELYNE /     ASSESSMENT:     RANGE OF MOTION:     Not formally measured secondary to patient status; will re-assess this PM        GAIT:    Distance Distance (ft): 75 Feet (ft)   Level of Assistance Stand-By Assistance   DME Rolling Walker   Weightbearing Status WBAT   Gait Quality Antalgic  and Decreased stance right   Comments Dizziness and nausea with ambulation; vitals assessed and WNL; RN notified     ASSESSMENT:  Ms. Get Harris is a pleasant 62year old female 1 day s/p right TKA. Patient is WBAT and seen this AM for initial evaluation.  At baseline, patient is an independent, community-level ambulator. Today, presents with decreased AROM R LE, decreased functional activity tolerance, and impaired balance/gait status. Ambulated 75ft with SBA. Limited by pain, nausea, and dizziness this AM. Will follow with BID plan of care; recommend HHPT at discharge. Plan to assess knee ROM, KOOS, and therapeutic exercises next treatment session. SUBJECTIVE:     SOCIAL HISTORY/LIVING ENVIRONMENT:  PAIN:   Social History/Living Environment:   Home Environment: Private residence  # Steps to Enter: 4  Rails to Enter: Yes  Hand Rails : Right  One/Two Story Residence: One story  Living Alone: Yes  Support Systems: Child(christiano); Family member(s)  Patient Expects to be Discharged to[de-identified] House  Current DME Used/Available at Home: Walker;Commode, bedside;Crutches  Tub or Shower Type: Shower  Sons live close by and can assist as needed per patient report; discussed having HHPT and family assist with stairs to enter the home. Pre Treatment: Pain Intensity 1: 7  Pain Location 1: Knee  Pain Orientation 1: Right  Pain Intervention(s) 1: Emotional support, Position, Ambulation/Increased Activity, Repositioned      PAST MEDICAL HISTORY:   Ms. Get Harris  has a past medical history of Anemia, Anxiety, Arthritis, CAD (coronary artery disease), Chronic fatigue, Chronic pain, DDD (degenerative disc disease), lumbar, Dyslipidemia, Essential hypertension, GERD (gastroesophageal reflux disease), Insomnia, Leg pain, Localized edema, Mixed hyperlipidemia, Nausea & vomiting, Psychiatric disorder, PUD (peptic ulcer disease), S/P PTCA (percutaneous transluminal coronary angioplasty) (2016), Scoliosis, SOB (shortness of breath), Spinal stenosis, SVT (supraventricular tachycardia) ([facility] [address] and Unstable angina ([facility] [address] She also has no past medical history of Difficult intubation, Malignant hyperthermia due to anesthesia, or Pseudocholinesterase deficiency.     PAST SURGICAL HISTORY:   Ms. Get Harris  has a past surgical history that includes hx gi; colonoscopy (N/A, 2019); hx heart catheterization (2016); hx heart catheterization (2017); hx heart catheterization (2019); neurological procedure unlisted; hx lap cholecystectomy; hx hysterectomy (); hx  section (, ); hx tonsillectomy; hx orthopaedic (Left); and hx orthopaedic (Left, 2019). OBJECTIVE:     GROSS EVALUATION:  RIGHT  OPERATIVE LE   Within Functional Limits   Abnormal   Comments   Strength      Hip [x] []    Knee [] [x] Limited functionally post op; of note, decreased sensation anterior R LE   Ankle [x] [] 5/5 distal power   Range of Motion [] []    Hip [x] []    Knee [] [x]      Not formally measured; will measure this PM   Ankle [x] []    LEFT  NON-OPERATIVE LE Within Functional Limits   Abnormal   Comments   Strength [x] []    Range of Motion [x] []        MOBILITY: I Mod I S SBA CGA Min Mod Max Total  NT x2 Comments:   Bed Mobility    Rolling [] [x] [] [] [] [] [] [] [] [] []    Supine to Sit [] [] [] [] [] [] [] [] [] [x] []    Scooting [] [x] [] [] [] [] [] [] [] [] []    Sit to Supine [] [x] [] [] [] [] [] [] [] [] []    Transfers    Sit to Stand [] [] [] [x] [] [] [] [] [] [] []    Bed to Chair [] [] [] [x] [] [] [] [] [] [] []    Stand to Sit [] [] [] [x] [] [] [] [] [] [] []    Toilet [] [] [] [] [] [] [] [] [] [x] []    I=Independent, Mod I=Modified Independent, S=Supervision, SBA=Standby Assistance, CGA=Contact Guard Assistance,   Min=Minimal Assistance, Mod=Moderate Assistance, Max=Maximal Assistance, Total=Total Assistance, NT=Not Tested  BALANCE: Good Fair+ Fair Fair- Poor NT Comments   Sitting Static [x] [] [] [] [] []    Sitting Dynamic [x] [] [] [] [] []              Standing Static [] [x] [] [] [] []    Standing Dynamic [] [x] [] [] [] []      Tool Used: Knee Injury and Osteoarthritis Outcome Short Form (KOOS-12)-   NOT COMPLETED  SECONDARY TO PATIENT STATUS-- Will complete this PM. See [facility] FOR BELOW   for outcome measure. SCORE: None (0) Mild (1) Moderate (2) Severe (3) Extreme (4)   Stiffness:         1. How severe is your knee stiffness after first waking in the morning? [] [] [] [] []   Pain:         What amount of knee pain have you experienced in the last week   doing the following activies? 2. Twisting/pivoting on your knee: [] [] [] [] []   3. Straightening knee fully: [] [] [] [] []   4. Going up or down stairs [] [] [] [] []   5. Staning upright [] [] [] [] []   Function, daily living        6. Rising from sitting [] [] [] [] []   7. Bending to floor/ an object [] [] [] [] []     Score:  Initial: /28 Most Recent: X/28 (Date: -- )   Interpretation of Score: Questions each scored on a 4 point scale with 4 representing the worst possible score and 0 representing the best possible score. The higher the point total, the worse the knee pain translating to a lower percentage of patient functioning. [facility] Mobility Inpatient Short Form  How much difficulty does the patient currently have. .. Unable A Lot A Little None   1. Turning over in bed (including adjusting bedclothes, sheets and blankets)? [] 1   [] 2   [] 3   [x] 4   2. Sitting down on and standing up from a chair with arms ( e.g., wheelchair, bedside commode, etc.)   [] 1   [] 2   [x] 3   [] 4   3. Moving from lying on back to sitting on the side of the bed? [] 1   [] 2   [] 3   [x] 4          How much help from another person does the patient currently need. .. Total A Lot A Little None   4. Moving to and from a bed to a chair (including a wheelchair)? [] 1   [] 2   [x] 3   [] 4   5. Need to walk in hospital room? [] 1   [] 2   [x] 3   [] 4   6. Climbing 3-5 steps with a railing? [] 1   [] 2   [x] 3   [] 4   © 2007, Trustees of [address] [place] [address] [address] [address], under license to "[employer].  All rights reserved     Score:  Initial: 20 Most Recent: X (Date: -- )   Interpretation of Tool:  Represents activities that are increasingly more difficult (i.e. Bed mobility, Transfers, Gait). PLAN:   FREQUENCY/DURATION: PT Plan of Care: BID for duration of hospital stay or until stated goals are met, whichever comes first.    REHAB POTENTIAL: Good    PROBLEM LIST:   (Skilled intervention is medically necessary to address:)  1. Decreased AROM/PROM  2. Decreased Bed Mobility Status  3. Decreased Gait Ability  4. Decreased Strength  5. Decreased Transfer Abilities  6. Increased Pain   INTERVENTIONS PLANNED:   (Benefits and precautions of physical therapy have been discussed with the patient.)  1. Self Care Training  2. Therapeutic Activity  3. Therapeutic Exercise/HEP  4. Neuromuscular Re-education  5. Gait Training  6. Modalities  7. Education     TREATMENT:     EVALUATION: Low Complexity : (Untimed Charge)    TREATMENT:   ($$ Therapeutic Activity: 8-22 mins    )    Therapeutic Activity (15 Minutes): Therapeutic activity included Rolling, Sit to Supine, Scooting, Transfer Training, Ambulation on level ground, Sitting balance  and Standing balance to improve functional Mobility. Therapeutic exercises deferred secondary to patient status. Will complete on PM treatment.    THERAPEUTIC  EXERCISES: DATE:   DATE:   DATE:      AM PM AM PM AM PM    [] [] [] [] [] []   Ankle Pumps --        Quad Sets --        Gluteal Sets --        Hip Abd/ADduction --        Straight Leg Raises --        Knee Slides --        Short Arc Quads --        Long Arc Quads --        Chair Slides --                 B = bilateral; AA = active assistive; A = active; P = passive      EDUCATION:  [] Home Exercises  [x] Fall Precautions  [x] Use of Cold Therapy Unit  [] D/C Instruction Review [] Knee Prosthesis Review  [x] Walker Management/Safety  [x] Adaptive Equipment as Needed     AFTER TREATMENT POSITION/PRECAUTIONS:  Bed, Needs within reach, RN notified and RN notified of patient status/progress    INTERDISCIPLINARY COLLABORATION:  RN/PCT, PT/PTA and OT/BAILEY    TOTAL TREATMENT DURATION:  PT Patient Time In/Time Out  Time In: 1025  Time Out: Lionel rTeviño DPT

## 2021-07-14 NOTE — PROGRESS NOTES
Problem: Falls - Risk of  Goal: *Absence of Falls  Description: Document [place] Fall Risk and appropriate interventions in the flowsheet.   Outcome: Progressing Towards Goal  Note: Fall Risk Interventions:            Medication Interventions: Teach patient to arise slowly, Patient to call before getting OOB    Elimination Interventions: Call light in reach              Problem: Patient Education: Go to Patient Education Activity  Goal: Patient/Family Education  Outcome: Progressing Towards Goal     Problem: Pain  Goal: *Control of Pain  Outcome: Progressing Towards Goal  Goal: *PALLIATIVE CARE:  Alleviation of Pain  Outcome: Progressing Towards Goal     Problem: Patient Education: Go to Patient Education Activity  Goal: Patient/Family Education  Outcome: Progressing Towards Goal     Problem: Constipation - Risk of  Goal: *Prevention of constipation  Outcome: Progressing Towards Goal     Problem: Patient Education: Go to Patient Education Activity  Goal: Patient/Family Education  Outcome: Progressing Towards Goal     Problem: Knee Replacement: Day of Surgery/Unit  Goal: Activity/Safety  Outcome: Progressing Towards Goal  Goal: Consults, if ordered  Outcome: Progressing Towards Goal  Goal: Diagnostic Test/Procedures  Outcome: Progressing Towards Goal  Goal: Nutrition/Diet  Outcome: Progressing Towards Goal  Goal: Medications  Outcome: Progressing Towards Goal  Goal: Respiratory  Outcome: Progressing Towards Goal  Goal: Treatments/Interventions/Procedures  Outcome: Progressing Towards Goal  Goal: Psychosocial  Outcome: Progressing Towards Goal  Goal: *Initiate mobility  Outcome: Progressing Towards Goal  Goal: *Optimal pain control at patient's stated goal  Outcome: Progressing Towards Goal  Goal: *Hemodynamically stable  Outcome: Progressing Towards Goal     Problem: Knee Replacement: Post-Op Day 1  Goal: Activity/Safety  Outcome: Progressing Towards Goal  Goal: Diagnostic Test/Procedures  Outcome: Progressing Towards Goal  Goal: Nutrition/Diet  Outcome: Progressing Towards Goal  Goal: Medications  Outcome: Progressing Towards Goal  Goal: Respiratory  Outcome: Progressing Towards Goal  Goal: Treatments/Interventions/Procedures  Outcome: Progressing Towards Goal  Goal: Psychosocial  Outcome: Progressing Towards Goal  Goal: Discharge Planning  Outcome: Progressing Towards Goal  Goal: *Demonstrates progressive activity  Outcome: Progressing Towards Goal  Goal: *Optimal pain control at patient's stated goal  Outcome: Progressing Towards Goal  Goal: *Hemodynamically stable  Outcome: Progressing Towards Goal  Goal: *Discharge plan identified  Outcome: Progressing Towards Goal     Problem: Knee Replacement: Post-Op Day 2  Goal: Activity/Safety  Outcome: Progressing Towards Goal  Goal: Diagnostic Test/Procedures  Outcome: Progressing Towards Goal  Goal: Medications  Outcome: Progressing Towards Goal  Goal: Respiratory  Outcome: Progressing Towards Goal  Goal: Treatments/Interventions/Procedures  Outcome: Progressing Towards Goal  Goal: Psychosocial  Outcome: Progressing Towards Goal  Goal: *Met physical therapy criteria for discharge to the next level of care  Outcome: Progressing Towards Goal  Goal: *Optimal pain control with oral analgesia  Outcome: Progressing Towards Goal  Goal: *Hemodynamically stable  Outcome: Progressing Towards Goal  Goal: *Tolerating diet  Outcome: Progressing Towards Goal  Goal: *Patient verbalizes understanding of discharge instructions  Outcome: Progressing Towards Goal     Problem: Patient Education: Go to Patient Education Activity  Goal: Patient/Family Education  Outcome: Progressing Towards Goal     Problem: Patient Education: Go to Patient Education Activity  Goal: Patient/Family Education  Outcome: Progressing Towards Goal

## 2021-07-14 NOTE — PROGRESS NOTES
Orthopedic Progress Note    2021  Admit Date: 2021  Admit Diagnosis: Arthritis of right knee [M17.11]    Post Op day: 1 Day Post-Op    Subjective:     Elfredia Friend     Poor pain control this AM      Objective:     Vital Signs:    Temp (24hrs), Av.8 °F (36.6 °C), Min:97.4 °F (36.3 °C), Max:98.6 °F (37 °C)      LAB:    [unfilled]  Lab Results   Component Value Date/Time    INR 0.9 2021 07:17 AM    INR 0.9 2017 09:15 AM     Lab Results   Component Value Date/Time    HGB 9.1 (L) 2021 04:40 AM    HGB 10.1 (L) 2021 07:47 PM       Physical Exam:    No apparent distress   No neurovascular issues reported  No gross problems noted   Dressing CDI, calf soft    Plan:     Continue PT/OT rehab as indicated    Nursing and SS for disposition plans    Increase po pain meds  Patient noted to have postop blood loss anemia       Signed By: Jonathan Garber MD

## 2021-07-15 NOTE — PROGRESS NOTES
Physician Progress Note      PATIENT:               Vero Hines  CSN #:                  [id number]  :                       1960  ADMIT DATE:       2021 6:15 AM  DISCH DATE:        2021 3:14 PM  RESPONDING  PROVIDER #:        Deepthi López MD          QUERY TEXT:    Pt admitted for right total knee arthroplasty . Pt noted to have drop in hgb. If possible, please document in the progress notes and discharge summary if you are evaluating and/or treating any of the following: The medical record reflects the following:  Risk Factors: s/p knee surgery  Clinical Indicators:  hgb12.0   hgb 9.1  Treatment: serial labs  Options provided:  -- Acute blood loss anemia  -- Postoperative acute blood loss anemia  -- Other - I will add my own diagnosis  -- Disagree - Not applicable / Not valid  -- Disagree - Clinically unable to determine / Unknown  -- Refer to Clinical Documentation Reviewer    PROVIDER RESPONSE TEXT:    This patient has postoperative acute blood loss anemia.     Query created by: Ronald Fischer on 2021 10:11 AM      Electronically signed by:  Deepthi López MD 7/15/2021 12:31 PM

## 2021-08-12 PROBLEM — F41.9 ANXIETY: Status: ACTIVE | Noted: 2021-08-12

## 2021-08-12 PROBLEM — G47.33 OSA (OBSTRUCTIVE SLEEP APNEA): Status: ACTIVE | Noted: 2021-08-12

## 2021-08-12 PROBLEM — G47.34 NOCTURNAL HYPOXEMIA: Status: ACTIVE | Noted: 2021-08-12

## 2021-08-12 PROBLEM — G47.00 PERSISTENT DISORDER OF INITIATING OR MAINTAINING SLEEP: Status: ACTIVE | Noted: 2021-08-12

## 2021-12-17 NOTE — PERIOP NOTES
PLEASE CONTINUE TAKING ALL PRESCRIPTION MEDICATIONS UP TO THE DAY OF SURGERY UNLESS OTHERWISE DIRECTED BELOW. DISCONTINUE all vitamins and supplements 7 days prior to surgery. DISCONTINUE Non-Steriodal Anti-Inflammatory (NSAIDS) such as Advil and Aleve 5 days prior to surgery. Home Medications to take  the day of surgery    Aspirin 81 mg, Duloxetine, Protonix           Home Medications   to Hold   Ibuprofen         Comments   May have Tylenol,             Please do not bring home medications with you on the day of surgery unless otherwise directed by your nurse. If you are instructed to bring home medications, please give them to your nurse as they will be administered by the nursing staff. If you have any questions, please call [facility] [phone] or [facility] [phone]. injury, finger

## 2022-02-10 ENCOUNTER — HOSPITAL ENCOUNTER (OUTPATIENT)
Dept: LAB | Age: 62
Discharge: HOME | End: 2022-02-10
Payer: COMMERCIAL

## 2022-02-10 DIAGNOSIS — T84.022A INSTABILITY OF INTERNAL RIGHT KNEE PROSTHESIS, INITIAL ENCOUNTER (HCC): ICD-10-CM

## 2022-02-10 LAB
BASOPHILS # BLD: 0 K/UL (ref 0–0.2)
BASOPHILS NFR BLD: 1 % (ref 0–2)
CRP SERPL-MCNC: 1.7 MG/DL (ref 0–0.9)
DIFFERENTIAL METHOD BLD: ABNORMAL
EOSINOPHIL # BLD: 0.3 K/UL (ref 0–0.8)
EOSINOPHIL NFR BLD: 5 % (ref 0.5–7.8)
ERYTHROCYTE [DISTWIDTH] IN BLOOD BY AUTOMATED COUNT: 15.5 % (ref 11.9–14.6)
ERYTHROCYTE [SEDIMENTATION RATE] IN BLOOD: 19 MM/HR (ref 0–30)
HCT VFR BLD AUTO: 38.6 % (ref 35.8–46.3)
HGB BLD-MCNC: 12.2 G/DL (ref 11.7–15.4)
IMM GRANULOCYTES # BLD AUTO: 0 K/UL (ref 0–0.5)
IMM GRANULOCYTES NFR BLD AUTO: 1 % (ref 0–5)
LYMPHOCYTES # BLD: 1 K/UL (ref 0.5–4.6)
LYMPHOCYTES NFR BLD: 19 % (ref 13–44)
MCH RBC QN AUTO: 25.9 PG (ref 26.1–32.9)
MCHC RBC AUTO-ENTMCNC: 31.6 G/DL (ref 31.4–35)
MCV RBC AUTO: 82 FL (ref 79.6–97.8)
MONOCYTES # BLD: 0.3 K/UL (ref 0.1–1.3)
MONOCYTES NFR BLD: 6 % (ref 4–12)
NEUTS SEG # BLD: 3.9 K/UL (ref 1.7–8.2)
NEUTS SEG NFR BLD: 70 % (ref 43–78)
NRBC # BLD: 0 K/UL (ref 0–0.2)
PLATELET # BLD AUTO: 239 K/UL (ref 150–450)
PMV BLD AUTO: 9.5 FL (ref 9.4–12.3)
RBC # BLD AUTO: 4.71 M/UL (ref 4.05–5.2)
WBC # BLD AUTO: 5.6 K/UL (ref 4.3–11.1)

## 2022-02-10 PROCEDURE — 85025 COMPLETE CBC W/AUTO DIFF WBC: CPT

## 2022-02-10 PROCEDURE — 85652 RBC SED RATE AUTOMATED: CPT

## 2022-02-10 PROCEDURE — 86140 C-REACTIVE PROTEIN: CPT

## 2022-02-10 PROCEDURE — 36415 COLL VENOUS BLD VENIPUNCTURE: CPT

## 2022-03-18 PROBLEM — I10 ESSENTIAL HYPERTENSION WITH GOAL BLOOD PRESSURE LESS THAN 130/85: Status: ACTIVE | Noted: 2019-07-23

## 2022-03-18 PROBLEM — E78.5 DYSLIPIDEMIA: Status: ACTIVE | Noted: 2017-07-24

## 2022-03-18 PROBLEM — R06.02 SOB (SHORTNESS OF BREATH): Status: ACTIVE | Noted: 2017-07-24

## 2022-03-18 PROBLEM — R60.0 LOCALIZED EDEMA: Status: ACTIVE | Noted: 2017-07-24

## 2022-03-19 PROBLEM — G47.33 OSA (OBSTRUCTIVE SLEEP APNEA): Status: ACTIVE | Noted: 2021-08-12

## 2022-03-19 PROBLEM — I25.10 CORONARY ARTERY DISEASE DUE TO LIPID RICH PLAQUE: Status: ACTIVE | Noted: 2017-07-03

## 2022-03-19 PROBLEM — Z76.89 ENCOUNTER TO ESTABLISH CARE: Status: ACTIVE | Noted: 2017-07-03

## 2022-03-19 PROBLEM — I47.10 SVT (SUPRAVENTRICULAR TACHYCARDIA): Status: ACTIVE | Noted: 2021-06-03

## 2022-03-19 PROBLEM — R53.82 CHRONIC FATIGUE: Status: ACTIVE | Noted: 2021-06-03

## 2022-03-19 PROBLEM — I47.1 SVT (SUPRAVENTRICULAR TACHYCARDIA) (HCC): Status: ACTIVE | Noted: 2021-06-03

## 2022-03-19 PROBLEM — E78.2 MIXED HYPERLIPIDEMIA: Status: ACTIVE | Noted: 2017-07-03

## 2022-03-19 PROBLEM — R07.9 CHEST PAIN: Status: ACTIVE | Noted: 2017-07-03

## 2022-03-19 PROBLEM — M79.606 LEG PAIN: Status: ACTIVE | Noted: 2017-07-28

## 2022-03-19 PROBLEM — I25.83 CORONARY ARTERY DISEASE DUE TO LIPID RICH PLAQUE: Status: ACTIVE | Noted: 2017-07-03

## 2022-03-19 PROBLEM — F41.9 ANXIETY: Status: ACTIVE | Noted: 2021-08-12

## 2022-03-19 PROBLEM — G47.00 PERSISTENT DISORDER OF INITIATING OR MAINTAINING SLEEP: Status: ACTIVE | Noted: 2021-08-12

## 2022-03-19 PROBLEM — I20.0 UNSTABLE ANGINA (HCC): Status: ACTIVE | Noted: 2019-05-19

## 2022-03-20 PROBLEM — Z98.61 S/P PTCA (PERCUTANEOUS TRANSLUMINAL CORONARY ANGIOPLASTY): Status: ACTIVE | Noted: 2017-07-03

## 2022-03-20 PROBLEM — M17.11 ARTHRITIS OF RIGHT KNEE: Status: ACTIVE | Noted: 2021-07-13

## 2022-03-20 PROBLEM — G47.34 NOCTURNAL HYPOXEMIA: Status: ACTIVE | Noted: 2021-08-12

## 2022-03-29 ENCOUNTER — HOME HEALTH ADMISSION (OUTPATIENT)
Dept: HOME HEALTH SERVICES | Facility: HOME HEALTH | Age: 62
End: 2022-03-29
Payer: COMMERCIAL

## 2022-03-29 ENCOUNTER — HOSPITAL ENCOUNTER (OUTPATIENT)
Dept: SURGERY | Age: 62
Discharge: HOME | End: 2022-03-29
Attending: ORTHOPAEDIC SURGERY
Payer: COMMERCIAL

## 2022-03-29 ENCOUNTER — HOSPITAL ENCOUNTER (OUTPATIENT)
Dept: REHABILITATION | Age: 62
Discharge: HOME | End: 2022-03-29
Payer: COMMERCIAL

## 2022-03-29 VITALS
DIASTOLIC BLOOD PRESSURE: 82 MMHG | WEIGHT: 169 LBS | OXYGEN SATURATION: 99 % | SYSTOLIC BLOOD PRESSURE: 115 MMHG | TEMPERATURE: 98.2 F | HEIGHT: 64 IN | BODY MASS INDEX: 28.85 KG/M2 | HEART RATE: 84 BPM

## 2022-03-29 DIAGNOSIS — G47.33 OSA (OBSTRUCTIVE SLEEP APNEA): Primary | ICD-10-CM

## 2022-03-29 LAB
ANION GAP SERPL CALC-SCNC: 1 MMOL/L (ref 7–16)
APTT PPP: 28.3 SEC (ref 24.1–35.1)
ATRIAL RATE: 73 BPM
BACTERIA SPEC CULT: ABNORMAL
BASOPHILS # BLD: 0 K/UL (ref 0–0.2)
BASOPHILS NFR BLD: 1 % (ref 0–2)
BUN SERPL-MCNC: 13 MG/DL (ref 8–23)
CALCIUM SERPL-MCNC: 10 MG/DL (ref 8.3–10.4)
CALCULATED P AXIS, ECG09: 44 DEGREES
CALCULATED R AXIS, ECG10: -5 DEGREES
CALCULATED T AXIS, ECG11: 11 DEGREES
CHLORIDE SERPL-SCNC: 107 MMOL/L (ref 98–107)
CO2 SERPL-SCNC: 30 MMOL/L (ref 21–32)
CREAT SERPL-MCNC: 0.79 MG/DL (ref 0.6–1)
CRP SERPL-MCNC: 0.9 MG/DL (ref 0–0.9)
DIAGNOSIS, 93000: NORMAL
DIFFERENTIAL METHOD BLD: ABNORMAL
EOSINOPHIL # BLD: 0.1 K/UL (ref 0–0.8)
EOSINOPHIL NFR BLD: 3 % (ref 0.5–7.8)
ERYTHROCYTE [DISTWIDTH] IN BLOOD BY AUTOMATED COUNT: 14.8 % (ref 11.9–14.6)
ERYTHROCYTE [SEDIMENTATION RATE] IN BLOOD: 3 MM/HR (ref 0–30)
EST. AVERAGE GLUCOSE BLD GHB EST-MCNC: 114 MG/DL
GLUCOSE SERPL-MCNC: 89 MG/DL (ref 65–100)
HBA1C MFR BLD: 5.6 % (ref 4.2–6.3)
HCT VFR BLD AUTO: 35 % (ref 35.8–46.3)
HGB BLD-MCNC: 11.3 G/DL (ref 11.7–15.4)
IMM GRANULOCYTES # BLD AUTO: 0 K/UL (ref 0–0.5)
IMM GRANULOCYTES NFR BLD AUTO: 0 % (ref 0–5)
INR PPP: 1
LYMPHOCYTES # BLD: 1 K/UL (ref 0.5–4.6)
LYMPHOCYTES NFR BLD: 21 % (ref 13–44)
MCH RBC QN AUTO: 26.9 PG (ref 26.1–32.9)
MCHC RBC AUTO-ENTMCNC: 32.3 G/DL (ref 31.4–35)
MCV RBC AUTO: 83.3 FL (ref 79.6–97.8)
MONOCYTES # BLD: 0.3 K/UL (ref 0.1–1.3)
MONOCYTES NFR BLD: 6 % (ref 4–12)
NEUTS SEG # BLD: 3.4 K/UL (ref 1.7–8.2)
NEUTS SEG NFR BLD: 69 % (ref 43–78)
NRBC # BLD: 0 K/UL (ref 0–0.2)
P-R INTERVAL, ECG05: 170 MS
PLATELET # BLD AUTO: 238 K/UL (ref 150–450)
PMV BLD AUTO: 9.4 FL (ref 9.4–12.3)
POTASSIUM SERPL-SCNC: 4.2 MMOL/L (ref 3.5–5.1)
PROTHROMBIN TIME: 13.5 SEC (ref 12.6–14.5)
Q-T INTERVAL, ECG07: 422 MS
QRS DURATION, ECG06: 88 MS
QTC CALCULATION (BEZET), ECG08: 464 MS
RBC # BLD AUTO: 4.2 M/UL (ref 4.05–5.2)
SERVICE CMNT-IMP: ABNORMAL
SODIUM SERPL-SCNC: 138 MMOL/L (ref 136–145)
VENTRICULAR RATE, ECG03: 73 BPM
WBC # BLD AUTO: 4.9 K/UL (ref 4.3–11.1)

## 2022-03-29 PROCEDURE — 94760 N-INVAS EAR/PLS OXIMETRY 1: CPT

## 2022-03-29 PROCEDURE — 77030027138 HC INCENT SPIROMETER -A

## 2022-03-29 PROCEDURE — 85730 THROMBOPLASTIN TIME PARTIAL: CPT

## 2022-03-29 PROCEDURE — 87641 MR-STAPH DNA AMP PROBE: CPT

## 2022-03-29 PROCEDURE — 85610 PROTHROMBIN TIME: CPT

## 2022-03-29 PROCEDURE — 86140 C-REACTIVE PROTEIN: CPT

## 2022-03-29 PROCEDURE — 85025 COMPLETE CBC W/AUTO DIFF WBC: CPT

## 2022-03-29 PROCEDURE — 85652 RBC SED RATE AUTOMATED: CPT

## 2022-03-29 PROCEDURE — 93005 ELECTROCARDIOGRAM TRACING: CPT | Performed by: ANESTHESIOLOGY

## 2022-03-29 PROCEDURE — 83036 HEMOGLOBIN GLYCOSYLATED A1C: CPT

## 2022-03-29 PROCEDURE — 97161 PT EVAL LOW COMPLEX 20 MIN: CPT

## 2022-03-29 PROCEDURE — 80048 BASIC METABOLIC PNL TOTAL CA: CPT

## 2022-03-29 RX ORDER — MELATONIN
2000 DAILY
COMMUNITY

## 2022-03-29 RX ORDER — METFORMIN HYDROCHLORIDE 500 MG/1
500 TABLET ORAL
COMMUNITY

## 2022-03-29 RX ORDER — LANOLIN ALCOHOL/MO/W.PET/CERES
5000 CREAM (GRAM) TOPICAL DAILY
COMMUNITY

## 2022-03-29 RX ORDER — ASPIRIN 81 MG/1
81 TABLET ORAL DAILY
COMMUNITY
End: 2022-04-22

## 2022-03-29 NOTE — PERIOP NOTES
Patient verified name and . Order for consent found in EHR and matches case posting; patient verified. Type 3 surgery, joint camp assessment complete. Labs per surgeon: CBC, BMP, PT/PTT, HGB-A1C, CRP, ESR  ; results pending. Labs per anesthesia protocol: no additional labs needed. EKG: completed today; within anesthesia protocols. Patient going to [address] Cardiology on 22 @ 3:30 pm for cardiac clearance. Charge nurse to follow up. Echo (21), Stress test (20), EKG (10/21/20), Heart cath (19), Sleep Center note (21), and [address] cardiology note (21) available in EHR for reference if needed. MRSA/MSSA swab collected; pharmacy to review and dose antibiotic as appropriate. Hospital approved surgical skin cleanser and instructions to return bottle on DOS given per hospital policy. Patient provided with handouts including Guide to Surgery, Pain Management, Hand Hygiene, Blood Transfusion Education, and [facility] Brochure. Patient answered medical/surgical history questions at their best of ability. All prior to admission medications documented in [facility]. Original medication prescription bottles NOT visualized during patient appointment. Patient instructed to hold all vitamins 3 weeks prior to surgery and NSAIDS 5 days prior to surgery. Patient teach back successful and patient demonstrates knowledge of instruction.

## 2022-03-29 NOTE — PROGRESS NOTES
Libra Awan  : [birth year](39 y.o.) Joint Janina Mckeon at [address], [facility].  [phone]       Physical Therapy Prehab Plan of Treatment and Evaluation Summary:3/29/2022    ICD-10: Treatment Diagnosis:   · Pain in Right Knee (M25.561)  · Stiffness of Right Knee, Not elsewhere classified (M25.661)  · Difficulty in walking, Not elsewhere classified (R26.2)  Precautions/Allergies:   Acetaminophen, Hydrocodone bitartrate, Lortab [hydrocodone-acetaminophen], Pcn [penicillins], and Statins-hmg-coa reductase inhibitors  MEDICAL/REFERRING DIAGNOSIS:  Instability of internal right knee prosthesis, initial encounter [P98.760C]  REFERRING PHYSICIAN: Alexsander Rojas MD  DATE OF SURGERY: 22    Assessment:   Comments:  Patient presents prior to R TKA revision. She had her R TKA in July of last year and has had a very audible clunking with knee movement since. She reports it is very painful. Patient stands all day at work and is pretty miserable. She will have assist from her sons and brother at d/c. PROBLEM LIST (Impacting functional limitations):  Ms. Get Harris presents with the following right lower extremity(s) problems:  1. Gait  2. Strength  3. Home Exercise Program  4. Pain   INTERVENTIONS PLANNED:  1. Home Exercise Program  2. Educational Discussion      TREATMENT PLAN: Effective Dates: 3/29/2022 TO 3/29/2022. Frequency/Duration: Patient to continue to perform home exercise program at least twice per day up until her surgery. GOALS: (Goals have been discussed and agreed upon with patient.)  Discharge Goals: Time Frame: 1 Day  1. Patient will demonstrate independence with a home exercise program designed to increase strength, range of motion, balance, coordination, functional technique and pain control to minimize functional deficits and optimize patient for total joint replacement.   Rehabilitation Potential For Stated Goals: Good  Regarding Nicky Naranjo's therapy, I certify that the treatment plan above will be carried out by a therapist or under their direction. Thank you for this referral,  Ken Tillman, PT               HISTORY:   Present Symptoms:  Pain Intensity 1: 8   History of Present Injury/Illness (Reason for Referral):  Medical/Referring Diagnosis: Instability of internal right knee prosthesis, initial encounter [Q84.359Y]   Past Medical History/Comorbidities:   Ms. Blaire Bonilla  has a past medical history of Anemia, Anxiety, Arthritis, CAD (coronary artery disease), Chronic fatigue, Chronic pain, DDD (degenerative disc disease), lumbar, Dyslipidemia, Essential hypertension, GERD (gastroesophageal reflux disease), History of COVID-19 (2022), Insomnia, Leg pain, Localized edema, Mixed hyperlipidemia, Nausea & vomiting, ROD (obstructive sleep apnea), Pre-diabetes, Psychiatric disorder, PUD (peptic ulcer disease), S/P PTCA (percutaneous transluminal coronary angioplasty) (), Scoliosis, SOB (shortness of breath), Spinal stenosis, SVT (supraventricular tachycardia) ([facility] [address] and Unstable angina ([facility] [address] Ms. Blaire Bonilla  has a past surgical history that includes colonoscopy (N/A, 2019); hx heart catheterization (2016); hx heart catheterization (2017); hx heart catheterization (2019); neurological procedure unlisted (, ); hx lap cholecystectomy (); hx hysterectomy (); hx  section (, ); hx tonsillectomy; hx orthopaedic (Left); hx orthopaedic (Left, 2019); and hx knee replacement (Right, 2021). Social History/Living Environment:   Home Environment: Private residence  # Steps to Enter: 5  Rails to Enter: Yes  Hand Rails : Bilateral  One/Two Story Residence: One story  Living Alone: Yes  Support Systems: Child(christiano); Other Family Member(s)  Patient Expects to be Discharged to[de-identified] Home  Current DME Used/Available at Home: [employer] , rolling; Crutches;  Shower chair  Tub or Shower Type: Shower    Work/Activity:  Works at [employer] Side:  RIGHT  Current Medications:  See Pre-assessment nursing note   Number of Personal Factors/Comorbidities that affect the Plan of Care: 0: LOW COMPLEXITY   EXAMINATION:   ADLs (Current Functional Status):   Ambulation:  [x] Independent  [] Walk Indoors Only  [] Walk Outdoors  [] Use Assistive Device  [] Use Wheelchair Only Dressing:  [x] Independent  Requires Assistance from Someone for:  [] Sock/Shoes  [] Pants  [] Everything   Bathing/Showering:   [x] Independent  [] Requires Assistance from Someone  [] Sponge Bath Only Household Activities:  [x] Routine house and yard work  [] Light Housework Only  [] None   Observation/Orthostatic Postural Assessment:       ROM/Flexibility:   AROM: Generally decreased, functional                       RLE AROM  R Knee Flexion: 110  R Knee Extension: 2   Strength:   Strength: Generally decreased, functional              RLE Strength  R Hip Flexion: 4  R Knee Flexion: 4  R Knee Extension: 4   Functional Mobility:    Coordination: Within functional limits    Stand to Sit: Independent  Sit to Stand: Independent  Distance (ft): 200 Feet (ft)  Ambulation - Level of Assistance: Independent  Stance: Right decreased  Gait Abnormalities: Antalgic          Balance:    Sitting: Intact  Standing: Intact   Body Structures Involved:  1. Joints  2. Muscles Body Functions Affected:  1. Movement Related Activities and Participation Affected:  1. Mobility   Number of elements that affect the Plan of Care: 4+: HIGH COMPLEXITY   CLINICAL PRESENTATION:   Presentation: Stable and uncomplicated: LOW COMPLEXITY   CLINICAL DECISION MAKING:   Tool Used: Knee injury and Osteoarthritis Outcome Score for Joint Replacement (KOOS, JR)  Score:  Initial: 23 (Interval: 28.251) 3/29/2022 Most Recent: TBD   Interpretation of Score: The KOOS, JR contains 7 items from the original KOOS survey. Items are coded from 0 to 4, none to extreme respectively. Carmella Chandrakantbernard is scored by summing the raw response (range 0-28) and then converting it to an interval score using the table provided below. The interval score ranges from 0 to 100 where 0 represents total knee disability and 100 represents perfect knee health. Medical Necessity:   · Ms. Olaf Brown is expected to optimize her lower extremity strength and ROM in preparation for joint replacement surgery. Reason for Services/Other Comments:  · Achieve baseline assesment of musculoskeletal system, functional mobility and home environment. , educate in PT HEP in preparation for surgery, educate in hospital plan of care. Use of outcome tool(s) and clinical judgement create a POC that gives a: Clear prediction of patient's progress: LOW COMPLEXITY   TREATMENT:   Treatment/Session Assessment:  Patient was instructed in PT- HEP to increase strength and ROM in LEs. Answered all questions. · Post session pain:  8/10  · Compliance with Program/Exercises: Will assess as treatment progresses.   Total Treatment Duration:  PT Patient Time In/Time Out  Time In: 1300  Time Out: 200 Kamar Carolina Davila

## 2022-03-29 NOTE — PROGRESS NOTES
03/29/22 1300   Oxygen Therapy   O2 Sat (%) 99 %   Pulse via Oximetry 75 beats per minute   O2 Device None (Room air)   Pre-Treatment   Breath Sounds Bilateral Clear;Diminished   Pt's symptoms include:    Snoring  Tiredness- excessive daytime sleepiness  Observed apnea  PREVIOUS DIAGNOSIS OF ROD  GERD  Neck size     35.5         cm  Modified Chan stage 4  SACS Score 9  STOP BANG 5  Height    5  '  4  \"   Weight   169  lbs  BMI 29.01      Refer patient for sleep study based on above assessment. [facility]  Phone number:  [phone]         Initial respiratory Assessment completed with pt. Pt was interviewed and evaluated in Joint camp prior to surgery. Patient ID:  Celso Yu  [id number]  31 y.o.  1960  Surgeon: Dr. Richelle Valdez  Date of Surgery: 4/21/2022  Procedure:  Total Right Knee Arthroplasty  Primary Care Physician: Jonathan Clement [phone]  Specialists:     Pt taught proper COUGH technique  DIAPHRAGMATIC BREATHING EXERCISE INSTRUCTIONS GIVEN    History of smoking:   DENIES                 Quit date:         Secondhand smoke:PARENTS    Past procedures with Oxygen desaturation or delayed awakening:DENIES    Past Medical History:   Diagnosis Date    Anemia     Anxiety     Arthritis     CAD (coronary artery disease)     1 stent placed in 2016, takes ASA 81 mg; cannot take statins; [facility]ology     Chronic fatigue     sleep medicine referral    Chronic pain     feet and back, takes ibuprofen    DDD (degenerative disc disease), lumbar     Dyslipidemia     Essential hypertension     no meds     GERD (gastroesophageal reflux disease)     History of COVID-19 01/08/2022    not hospitalized     Insomnia     Leg pain     Localized edema     ankles, feet, knees    Mixed hyperlipidemia     Nausea & vomiting     post op nausea and vomiting    ROD (obstructive sleep apnea)     mild; has a c-pap but doesn't use     Pre-diabetes     not checking bs at home; taking metformin daily ; A1C=5.6 on 3/29/22    Psychiatric disorder     anxiety    PUD (peptic ulcer disease)     S/P PTCA (percutaneous transluminal coronary angioplasty) 2016    LAD     Scoliosis     SOB (shortness of breath)     Spinal stenosis     SVT (supraventricular tachycardia) (HCC)     Unstable angina (HCC)       ROD - NOT USING CPAP  Respiratory history:DENIES SOB                                                                  Respiratory meds:  DENIES    FAMILY PRESENT:             NO     PAST SLEEP STUDY:        YES       7/7/2021 AHI 10.2 SUPINE AHI 13.3 REM AHI 21.1 SAT 81%- PT IS SYMPTOMATIC            HX OF ROD:                        YES                   ROD assessment:     HX OF SVT- CHEST PAIN                                          SLEEPS ON SIDE              PHYSICAL EXAM   Body mass index is 29.01 kg/m².    Visit Vitals  /82 (BP 1 Location: Left upper arm, BP Patient Position: At rest;Sitting)   Pulse 84   Temp 98.2 °F (36.8 °C)   Ht 5' 4\" (1.626 m)   Wt 76.7 kg (169 lb)   SpO2 (P) 99%   BMI 29.01 kg/m²     Neck circumference:    35.5  cm    Loud snoring:                                                 YES         Witnessed apnea or wakening gasping or choking:              APNEA  Awakens with headaches:                                               DENIES  Morning or daytime tiredness/ sleepiness:                             TIRED  Dry mouth or sore throat in morning:            YES                                               Chan stage:  4                                   SACS score:9  Stop Bang   STOP-BANG  Does the patient snore loudly (louder than talking or loud enough to be heard through closed doors)?: Yes  Does the patient often feel tired, fatigued, or sleepy during the daytime, even after a \"good\" night's sleep?: Yes  Has anyone ever observed the patient stop breathing during their sleep? : Yes  Does the patient have or are they being treated for high blood pressure?: No  Is the patient's BMI greater than 35?: No  Is your neck circumference greater than 17 inches (Male) or 16 inches (Female)?: No  Is the patient older than 48?: Yes  Is the patient male?: No  ROD Score: 4  Has the patient been referred to Sleep Medicine?: Yes  Has the patient previously been diagnosed with Obstructive Sleep Apnea?: Yes  Treated or Untreated?: Untreated                                PT NON-COMPLIANT with CPAP. Dangers of non-compliance with treatment of ROD explained to pt. ROD handouts given to pt. ALBUTEROL Q 6 PRN  CONT. SAT MONITOR HS after surgery. O2 @ 3 lpm NC HS   RESPIRATORY ASSESSMENT Q SHIFT. Referrals:  [facility] IN July 2022  Pt.  Phone Number:

## 2022-03-29 NOTE — PROGRESS NOTES
Joint Camp Case Management note:  Patient screened in Prehab for discharge planning needs. Patient scheduled for a future total joint replacement. We discussed Home Health and equipment needed after surgery. List of Home Health providers offered. Patient w/o preference towards provider. Initial referral sent to [facility]. Denies any equipment needs as she has a walker.  She declined need for a 3-1 BSC (This is a revision)

## 2022-03-29 NOTE — PERIOP NOTES
Bailey Amezquita MD    Your patient recently had labs drawn during a hospital appointment due to an upcoming surgery. The results are attached. If you have any questions or concerns please reach out to your patient for a follow-up in your office. Please do not respond to this message as my mailbox is not monitored. You may call [phone] with questions or concerns. Recent Results (from the past 12 hour(s))   CBC WITH AUTOMATED DIFF    Collection Time: 03/29/22 12:37 PM   Result Value Ref Range    WBC 4.9 4.3 - 11.1 K/uL    RBC 4.20 4.05 - 5.2 M/uL    HGB 11.3 (L) 11.7 - 15.4 g/dL    HCT 35.0 (L) 35.8 - 46.3 %    MCV 83.3 79.6 - 97.8 FL    MCH 26.9 26.1 - 32.9 PG    MCHC 32.3 31.4 - 35.0 g/dL    RDW 14.8 (H) 11.9 - 14.6 %    PLATELET 238 037 - 797 K/uL    MPV 9.4 9.4 - 12.3 FL    ABSOLUTE NRBC 0.00 0.0 - 0.2 K/uL    DF AUTOMATED      NEUTROPHILS 69 43 - 78 %    LYMPHOCYTES 21 13 - 44 %    MONOCYTES 6 4.0 - 12.0 %    EOSINOPHILS 3 0.5 - 7.8 %    BASOPHILS 1 0.0 - 2.0 %    IMMATURE GRANULOCYTES 0 0.0 - 5.0 %    ABS. NEUTROPHILS 3.4 1.7 - 8.2 K/UL    ABS. LYMPHOCYTES 1.0 0.5 - 4.6 K/UL    ABS. MONOCYTES 0.3 0.1 - 1.3 K/UL    ABS. EOSINOPHILS 0.1 0.0 - 0.8 K/UL    ABS. BASOPHILS 0.0 0.0 - 0.2 K/UL    ABS. IMM.  GRANS. 0.0 0.0 - 0.5 K/UL   HEMOGLOBIN A1C WITH EAG    Collection Time: 03/29/22 12:37 PM   Result Value Ref Range    Hemoglobin A1c 5.6 4.20 - 6.30 %    Est. average glucose 114 mg/dL   PROTHROMBIN TIME + INR    Collection Time: 03/29/22 12:37 PM   Result Value Ref Range    Prothrombin time 13.5 12.6 - 14.5 sec    INR 1.0     PTT    Collection Time: 03/29/22 12:37 PM   Result Value Ref Range    aPTT 28.3 24.1 - 35.1 SEC   SED RATE, AUTOMATED    Collection Time: 03/29/22 12:37 PM   Result Value Ref Range    Sed rate, automated 3 0 - 30 mm/hr   C REACTIVE PROTEIN, QT    Collection Time: 03/29/22 12:37 PM   Result Value Ref Range    C-Reactive protein 0.9 0.0 - 0.9 mg/dL   METABOLIC PANEL, BASIC Collection Time: 03/29/22 12:37 PM   Result Value Ref Range    Sodium 138 136 - 145 mmol/L    Potassium 4.2 3.5 - 5.1 mmol/L    Chloride 107 98 - 107 mmol/L    CO2 30 21 - 32 mmol/L    Anion gap 1 (L) 7 - 16 mmol/L    Glucose 89 65 - 100 mg/dL    BUN 13 8 - 23 MG/DL    Creatinine 0.79 0.6 - 1.0 MG/DL    GFR est AA >60 >60 ml/min/1.73m2    GFR est non-AA >60 >60 ml/min/1.73m2    Calcium 10.0 8.3 - 10.4 MG/DL   EKG, 12 LEAD, INITIAL    Collection Time: 03/29/22 12:44 PM   Result Value Ref Range    Ventricular Rate 73 BPM    Atrial Rate 73 BPM    P-R Interval 170 ms    QRS Duration 88 ms    Q-T Interval 422 ms    QTC Calculation (Bezet) 464 ms    Calculated P Axis 44 degrees    Calculated R Axis -5 degrees    Calculated T Axis 11 degrees    Diagnosis       Normal sinus rhythm  Nonspecific T wave abnormality  Abnormal ECG  When compared with ECG of 19-MAY-2019 19:07,  T wave inversion no longer evident in Lateral leads

## 2022-03-29 NOTE — PERIOP NOTES
PLEASE CONTINUE TAKING ALL PRESCRIPTION MEDICATIONS UP TO THE DAY OF SURGERY UNLESS OTHERWISE DIRECTED BELOW. DISCONTINUE all vitamins, herbals, and supplements 21 days prior to surgery. DISCONTINUE Non-Steriodal Anti-Inflammatory (NSAIDS) such as Advil, Ibuprofen, Motrin, Naproxen, and Aleve 5-7 days prior to surgery. Home Medications to take  the day of surgery (4/21/22)      Pantoprazole      81 mg Aspirin            Home Medications   to Hold           Comments   Bring: Photo ID, Insurance card, Yessenia-hex soap, Incentive Spirometer    On the day before surgery (4/20/22)  please take Acetaminophen 1000mg in the morning and then again before bed. You may substitute for Tylenol 650 mg. Please do not bring home medications with you on the day of surgery unless otherwise directed by your nurse. If you are instructed to bring home medications, please give them to your nurse as they will be administered by the nursing staff. If you have any questions, please call [facility] [phone]. A copy of this note was provided to the patient for reference.

## 2022-03-30 NOTE — ADVANCED PRACTICE NURSE
Total Joint Surgery Preoperative Chart Review      Patient ID:  Gabby Smallwood  [id number]  40 y.o.  1960  Surgeon: Dr. Jon Nelson  Date of Surgery: 4/21/2022  Procedure: Total Right Knee Arthroplasty  Primary Care Physician: Ricke Bosworth, [phone]  Specialty Physician(s):      Subjective:   Gabby Smallwood is a 65 y.o. WHITE/NON- female who presents for preoperative evaluation for Total Right Knee arthroplasty. This is a preoperative chart review note based on data collected by the nurse at the surgical Pre-Assessment visit.     Past Medical History:   Diagnosis Date    Anemia     Anxiety     Arthritis     CAD (coronary artery disease)     1 stent placed in 2016, takes ASA 81 mg; cannot take statins; followed by [facility] cardiology     Chronic fatigue     sleep medicine referral    Chronic pain     feet and back, takes ibuprofen    DDD (degenerative disc disease), lumbar     Dyslipidemia     Essential hypertension     no meds     GERD (gastroesophageal reflux disease)     History of COVID-19 01/08/2022    not hospitalized     Insomnia     Leg pain     Localized edema     ankles, feet, knees    Mixed hyperlipidemia     Nausea & vomiting     post op nausea and vomiting    ROD (obstructive sleep apnea)     mild; has a c-pap but doesn't use     Pre-diabetes     not checking bs at home; taking metformin daily ; A1C=5.6 on 3/29/22    Psychiatric disorder     anxiety    PUD (peptic ulcer disease)     S/P PTCA (percutaneous transluminal coronary angioplasty) 2016    LAD     Scoliosis     SOB (shortness of breath)     Spinal stenosis     SVT (supraventricular tachycardia) ([address]     Unstable angina ([address]       Past Surgical History:   Procedure Laterality Date    COLONOSCOPY N/A 5/21/2019    COLONOSCOPY/ BMI 30 ROOM 316 performed by Shayan Rogel MD at [place] [address]  05/2016    LAD stent 2016    HX HEART CATHETERIZATION  07/2017    no stents    HX HEART CATHETERIZATION  05/2019    no stents    HX HYSTERECTOMY  2002    HX KNEE REPLACEMENT Right 07/13/2021    HX LAP CHOLECYSTECTOMY  1994    HX ORTHOPAEDIC Left     left foot. scraped bone down for spurs    HX ORTHOPAEDIC Left 12/26/2019    Left second and third tarsometatarsal joint arthrodesis     HX TONSILLECTOMY      NEUROLOGICAL PROCEDURE UNLISTED  2001, 2000    neck surgery, herniated disk C4-C5, x2 6 months apart     Family History   Problem Relation Age of Onset    Diabetes Mother     Heart Disease Mother     Kidney Disease Mother     Heart Disease Father     Breast Cancer Neg Hx       Social History     Tobacco Use    Smoking status: Never Smoker    Smokeless tobacco: Never Used   Substance Use Topics    Alcohol use: No       Prior to Admission medications    Medication Sig Start Date End Date Taking? Authorizing Provider   metFORMIN (GLUCOPHAGE) 500 mg tablet Take 500 mg by mouth nightly. Indications: prevention of type 2 diabetes mellitus   Yes Provider, Historical   cyanocobalamin (Vitamin B-12) 1,000 mcg tablet Take 5,000 mcg by mouth daily. Yes Provider, Historical   zinc 50 mg tab tablet Take 50 mg by mouth daily. Yes Provider, Historical   COLLAGEN Take 1,000 mg by mouth daily. Yes Provider, Historical   cholecalciferol (Vitamin D3) (1000 Units /25 mcg) tablet Take 2,000 Units by mouth daily. Yes Provider, Historical   aspirin delayed-release 81 mg tablet Take 81 mg by mouth daily. Yes Provider, Historical   nitroglycerin (NITROSTAT) 0.4 mg SL tablet 1 Tab by SubLINGual route every five (5) minutes as needed for Chest Pain. Up to 3 doses. 10/21/20  Yes Dl Diaz MD   DULoxetine (CYMBALTA) 30 mg capsule Take 30 mg by mouth nightly. Indications: repeated episodes of anxiety   Yes Provider, Historical   pantoprazole (PROTONIX) 40 mg tablet Take 1 Tab by mouth daily.   Patient taking differently: Take 40 mg by mouth daily. Take / use AM day of surgery  per anesthesia protocols. Indications: gastroesophageal reflux disease 5/22/19  Yes Sha Simeon MD   DULoxetine (CYMBALTA) 60 mg capsule Take 60 mg by mouth nightly. Takes total of 90 mg  Indications: anxiousness associated with depression, repeated episodes of anxiety   Yes Provider, Historical   trazodone HCl (DESYREL PO) Take 200 mg by mouth nightly. For insomnia and anxiety  Indications: pain   Yes Provider, Historical     Allergies   Allergen Reactions    Acetaminophen Itching     Pt denies    Hydrocodone Bitartrate Itching    Lortab [Hydrocodone-Acetaminophen] Rash     Rash and itch    Pcn [Penicillins] Rash    Statins-Hmg-Coa Reductase Inhibitors Other (comments)     Muscle aches          Objective:     Physical Exam:   Patient Vitals for the past 24 hrs:   Temp Pulse BP SpO2   03/29/22 1300 -- -- -- 99 %   03/29/22 1159 98.2 °F (36.8 °C) 84 115/82 96 %       ECG:    EKG Results     Procedure 720 Value Units Date/Time    EKG, 12 LEAD, INITIAL [[id number]] Collected: 03/29/22 1244    Order Status: Completed Updated: 03/29/22 1435     Ventricular Rate 73 BPM      Atrial Rate 73 BPM      P-R Interval 170 ms      QRS Duration 88 ms      Q-T Interval 422 ms      QTC Calculation (Bezet) 464 ms      Calculated P Axis 44 degrees      Calculated R Axis -5 degrees      Calculated T Axis 11 degrees      Diagnosis --     Normal sinus rhythm  Nonspecific T wave abnormality  Abnormal ECG  When compared with ECG of 19-MAY-2019 19:07,  No significant change was found  Confirmed by Genaro Godoy ([id number]) on 3/29/2022 2:35:20 PM            Data Review:   Labs:   Recent Results (from the past 24 hour(s))   MSSA/MRSA SC BY PCR, NASAL SWAB    Collection Time: 03/29/22 12:37 PM    Specimen: Nasal swab   Result Value Ref Range    Special Requests: NO SPECIAL REQUESTS      Culture result: (A)       MRSA target DNA not detected, SA target DNA detected.    A MRSA negative, SA positive test result does not preclude MRSA nasal colonization. CBC WITH AUTOMATED DIFF    Collection Time: 03/29/22 12:37 PM   Result Value Ref Range    WBC 4.9 4.3 - 11.1 K/uL    RBC 4.20 4.05 - 5.2 M/uL    HGB 11.3 (L) 11.7 - 15.4 g/dL    HCT 35.0 (L) 35.8 - 46.3 %    MCV 83.3 79.6 - 97.8 FL    MCH 26.9 26.1 - 32.9 PG    MCHC 32.3 31.4 - 35.0 g/dL    RDW 14.8 (H) 11.9 - 14.6 %    PLATELET 828 027 - 464 K/uL    MPV 9.4 9.4 - 12.3 FL    ABSOLUTE NRBC 0.00 0.0 - 0.2 K/uL    DF AUTOMATED      NEUTROPHILS 69 43 - 78 %    LYMPHOCYTES 21 13 - 44 %    MONOCYTES 6 4.0 - 12.0 %    EOSINOPHILS 3 0.5 - 7.8 %    BASOPHILS 1 0.0 - 2.0 %    IMMATURE GRANULOCYTES 0 0.0 - 5.0 %    ABS. NEUTROPHILS 3.4 1.7 - 8.2 K/UL    ABS. LYMPHOCYTES 1.0 0.5 - 4.6 K/UL    ABS. MONOCYTES 0.3 0.1 - 1.3 K/UL    ABS. EOSINOPHILS 0.1 0.0 - 0.8 K/UL    ABS. BASOPHILS 0.0 0.0 - 0.2 K/UL    ABS. IMM.  GRANS. 0.0 0.0 - 0.5 K/UL   HEMOGLOBIN A1C WITH EAG    Collection Time: 03/29/22 12:37 PM   Result Value Ref Range    Hemoglobin A1c 5.6 4.20 - 6.30 %    Est. average glucose 114 mg/dL   PROTHROMBIN TIME + INR    Collection Time: 03/29/22 12:37 PM   Result Value Ref Range    Prothrombin time 13.5 12.6 - 14.5 sec    INR 1.0     PTT    Collection Time: 03/29/22 12:37 PM   Result Value Ref Range    aPTT 28.3 24.1 - 35.1 SEC   SED RATE, AUTOMATED    Collection Time: 03/29/22 12:37 PM   Result Value Ref Range    Sed rate, automated 3 0 - 30 mm/hr   C REACTIVE PROTEIN, QT    Collection Time: 03/29/22 12:37 PM   Result Value Ref Range    C-Reactive protein 0.9 0.0 - 0.9 mg/dL   METABOLIC PANEL, BASIC    Collection Time: 03/29/22 12:37 PM   Result Value Ref Range    Sodium 138 136 - 145 mmol/L    Potassium 4.2 3.5 - 5.1 mmol/L    Chloride 107 98 - 107 mmol/L    CO2 30 21 - 32 mmol/L    Anion gap 1 (L) 7 - 16 mmol/L    Glucose 89 65 - 100 mg/dL    BUN 13 8 - 23 MG/DL    Creatinine 0.79 0.6 - 1.0 MG/DL    GFR est AA >60 >60 ml/min/1.73m2    GFR est non-AA >60 >60 ml/min/1.73m2    Calcium 10.0 8.3 - 10.4 MG/DL   EKG, 12 LEAD, INITIAL    Collection Time: 03/29/22 12:44 PM   Result Value Ref Range    Ventricular Rate 73 BPM    Atrial Rate 73 BPM    P-R Interval 170 ms    QRS Duration 88 ms    Q-T Interval 422 ms    QTC Calculation (Bezet) 464 ms    Calculated P Axis 44 degrees    Calculated R Axis -5 degrees    Calculated T Axis 11 degrees    Diagnosis       Normal sinus rhythm  Nonspecific T wave abnormality  Abnormal ECG  When compared with ECG of 19-MAY-2019 19:07,  No significant change was found  Confirmed by Soundra Lips ([id number]) on 3/29/2022 2:35:20 PM           Problem List:  )  Patient Active Problem List   Diagnosis Code    Encounter to establish care Z76.89    S/P PTCA (percutaneous transluminal coronary angioplasty) Z98.61    Coronary artery disease due to lipid rich plaque I25.10, I25.83    Mixed hyperlipidemia E78.2    Chest pain R07.9    SOB (shortness of breath) R06.02    Dyslipidemia E78.5    Localized edema R60.0    Leg pain M79.606    Unstable angina (HCC) I20.0    Essential hypertension with goal blood pressure less than 130/85 I10    SVT (supraventricular tachycardia) (HCC) I47.1    Chronic fatigue R53.82    Arthritis of right knee M17.11    ROD (obstructive sleep apnea) G47.33    Anxiety F41.9    Nocturnal hypoxemia G47.34    Persistent disorder of initiating or maintaining sleep G47.00       Total Joint Surgery Pre-Assessment Recommendations:           Patient reports the symptoms of snoring, fatigue, observed apnea and /or excessive daytime sleepiness. Will refer patient for HST based on above assessment. Recommend continuous saturation monitoring hours of sleep, during hospitalization.     Signed By: Dangelo Rouse, NP-C    March 30, 2022

## 2022-04-01 NOTE — PERIOP NOTES
Cardiology Clearance completed at appointment 04/01/22 - reads as follows : PLAN:      1. CAD:  On ASA, cannot take statins.       The patient has been instructed to call with any angina or equivalent as reviewed today. All questions were answered with the patient voicing complete understanding.       The patient is average risk for a edwar-operative cardiac event, the patient understands this risk and no cardiac test will lower the risk at this time. The patient has no unstable cardiac symptoms at this time. Fine to proceed with surgery, recommend optimal BP control in the edwar-op period. We will be available and can follow along with you if needed. Please call as needed, thank you. All questions were answered with the patient voicing understanding.     Needs to stay no ASA.      2. HTN: echo ok.       3 HPL:  LDL high. Cannot take statins, need to assess for repatha in the future. Seeing Dr. Rachel Silva.         Patient has been instructed and agrees to call our office with any issues or other concerns related to their cardiac condition(s) and/or complaint(s).       Follow-up and Dispositions    · Return in about 6 months (around 10/1/2022).                Kamaljit Velazquez,   4/1/2022

## 2022-04-15 NOTE — H&P
H&P    Patient ID:  France Parker  [id number]  41 y.o.  1960  Surgeon:  Dave Lorenz MD  Date of Surgery: * No surgery date entered *  Procedure: Right Total Knee Arthroplasty revision  Primary Care Physician: Aida Ashton MD        Subjective:  France Parker is a 65 y.o. WHITE/NON- female who presents with right knee pain. They had a right TKA several years. They have a history of right knee pain for several months. They also complain of instability. Symptoms worse with walking long distances and relieved with rest. Conservative treatment consisting of  activity modification and injections have not helped. The patient lives with their family. The patients goal after surgery is improved pain and function.         Past Medical History:   Diagnosis Date    Anemia     Anxiety     Arthritis     CAD (coronary artery disease)     1 stent placed in 2016, takes ASA 81 mg; cannot take statins; followed by [facility] cardiology     Chronic fatigue     sleep medicine referral    Chronic pain     feet and back, takes ibuprofen    DDD (degenerative disc disease), lumbar     Dyslipidemia     Essential hypertension     no meds     GERD (gastroesophageal reflux disease)     History of COVID-19 01/08/2022    not hospitalized     Insomnia     Leg pain     Localized edema     ankles, feet, knees    Mixed hyperlipidemia     Nausea & vomiting     post op nausea and vomiting    ROD (obstructive sleep apnea)     mild; has a c-pap but doesn't use     Pre-diabetes     not checking bs at home; taking metformin daily ; A1C=5.6 on 3/29/22    Psychiatric disorder     anxiety    PUD (peptic ulcer disease)     S/P PTCA (percutaneous transluminal coronary angioplasty) 2016    LAD     Scoliosis     SOB (shortness of breath)     Spinal stenosis     SVT (supraventricular tachycardia) ([address]     Unstable angina ([address]       Past Surgical History:   Procedure Laterality Date    COLONOSCOPY N/A 5/21/2019    COLONOSCOPY/ BMI 30 ROOM 316 [facility] [address]  05/2016    LAD stent 2016    HX HEART CATHETERIZATION  07/2017    no stents    HX HEART CATHETERIZATION  05/2019    no stents    HX HYSTERECTOMY  2002    HX KNEE REPLACEMENT Right 07/13/2021    HX LAP CHOLECYSTECTOMY  1994    HX ORTHOPAEDIC Left     left foot. scraped bone down for spurs    HX ORTHOPAEDIC Left 12/26/2019    Left second and third tarsometatarsal joint arthrodesis     HX TONSILLECTOMY      NEUROLOGICAL PROCEDURE UNLISTED  2001, 2000    neck surgery, herniated disk C4-C5, x2 6 months apart     Family History   Problem Relation Age of Onset    Diabetes Mother     Heart Disease Mother     Kidney Disease Mother     Heart Disease Father     Breast Cancer Neg Hx       Social History     Tobacco Use    Smoking status: Never Smoker    Smokeless tobacco: Never Used   Substance Use Topics    Alcohol use: No       Prior to Admission medications    Medication Sig Start Date End Date Taking? Authorizing Provider   nitroglycerin (NITROSTAT) 0.4 mg SL tablet 1 Tablet by SubLINGual route every five (5) minutes as needed for Chest Pain. Up to 3 doses. 4/1/22   Tonja Zamora,    metFORMIN (GLUCOPHAGE) 500 mg tablet Take 500 mg by mouth nightly. Indications: prevention of type 2 diabetes mellitus    Provider, Historical   cyanocobalamin (Vitamin B-12) 1,000 mcg tablet Take 5,000 mcg by mouth daily. Provider, Historical   zinc 50 mg tab tablet Take 50 mg by mouth daily. Provider, Historical   COLLAGEN Take 1,000 mg by mouth daily. Provider, Historical   cholecalciferol (Vitamin D3) (1000 Units /25 mcg) tablet Take 2,000 Units by mouth daily. Provider, Historical   aspirin delayed-release 81 mg tablet Take 81 mg by mouth daily.     Provider, Historical   DULoxetine (CYMBALTA) 30 mg capsule Take 30 mg by mouth nightly. Indications: repeated episodes of anxiety    Provider, Historical   pantoprazole (PROTONIX) 40 mg tablet Take 1 Tab by mouth daily. Patient taking differently: Take 40 mg by mouth daily. Take / use AM day of surgery  per anesthesia protocols. Indications: gastroesophageal reflux disease 5/22/19   Sha Simeon MD   DULoxetine (CYMBALTA) 60 mg capsule Take 60 mg by mouth nightly. Takes total of 90 mg  Indications: anxiousness associated with depression, repeated episodes of anxiety    Provider, Historical   trazodone HCl (DESYREL PO) Take 200 mg by mouth nightly. For insomnia and anxiety  Indications: pain    Provider, Historical     Allergies   Allergen Reactions    Acetaminophen Itching     Pt denies    Hydrocodone Bitartrate Itching    Lortab [Hydrocodone-Acetaminophen] Rash     Rash and itch    Pcn [Penicillins] Rash    Statins-Hmg-Coa Reductase Inhibitors Other (comments)     Muscle aches        REVIEW OF SYSTEMS:  CONSTITUTIONAL: Denies fever, decreased appetite, weight loss/gain, night sweats or fatigue. HEENT: Denies vision or hearing changes. denies glasses. denies hearing aids. CARDIAC: Denies CP, palpitations, rheumatic fever, murmur, peripheral edema, carotid artery disease or syncopal episodes. RESPIRATORY: Denies dyspnea on exertion, asthma, COPD or orthopnea. GI: Denies GERD, history of GI bleed or melena, PUD, hepatitis or cirrhosis. : Denies dysuria, hematuria. denies BPH symptoms. HEMATOLOGIC: Denies anemia or blood disorders. ENDOCRINE: Denies thyroid disease. MUSCULOSKELETAL: See HPI. NEUROLOGIC: Denies seizure, peripheral neuropathy or memory loss. PSYCH: Denies depression, anxiety or insomnia. SKIN: Denies rash or open sores. Objective:    PHYSICAL EXAM  GENERAL: No data found. EYES: PERRL. EOM intact. MOUTH:Teeth and Gums normal. NECK: Full ROM. Trachea midline. No thyromegaly or JVD. CARDIOVASCULAR: Regular rate and rhythm. No murmur or gallops. No carotid bruits. Peripheral pulses: radial 2 +, PT 2+, DP 2+ bilaterally. LUNGS: CTA bilaterally. No wheezes, rhonchi or rales. GI: positive BS. Abdomen nontender. NEUROLOGIC: Alert and oriented x 3. Bilateral equal strong had grasp and bilateral equal strong plantar flexion and dorsiflexion. GAIT: abnormal MUSCULOSKELETAL: ROM: full with pain. Tenderness: over the medial and lateral joint lines. Crepitus: present. SKIN: No rash, bruising, swelling, redness or warmth. Labs:  No results found for this or any previous visit (from the past 24 hour(s)). Xray Right knee: AP standing, flexion lateral, and sunrise view of the right knee was obtained  This demonstrated  A cemented tricompartmental total knee replacement Clinical Ink. I do not see any sign of loosening or change in position compared to initial postoperative films. I do not see any issues with coronal alignment. .    Radiographic impression: Right total knee arthroplasty with stable fixation. Assessment:  Painful right total knee arthroplasty   Total Right Knee Arthroplasty revision Indicated.   Patient Active Problem List   Diagnosis Code    Encounter to establish care Z76.89    S/P PTCA (percutaneous transluminal coronary angioplasty) Z98.61    Coronary artery disease due to lipid rich plaque I25.10, I25.83    Mixed hyperlipidemia E78.2    Chest pain R07.9    SOB (shortness of breath) R06.02    Dyslipidemia E78.5    Localized edema R60.0    Leg pain M79.606    Unstable angina (HCC) I20.0    Essential hypertension with goal blood pressure less than 130/85 I10    SVT (supraventricular tachycardia) (HCC) I47.1    Chronic fatigue R53.82    Arthritis of right knee M17.11    ROD (obstructive sleep apnea) G47.33    Anxiety F41.9    Nocturnal hypoxemia G47.34    Persistent disorder of initiating or maintaining sleep G47.00       Plan:  I have advised the patient of the risks and consequences, including possible complications of performing total joint replacement, as well as not doing this operation. The patient had the opportunity to ask questions and have them answered to their satisfaction.      Signed:  CHRISTINA Garcia 4/15/2022

## 2022-04-20 ENCOUNTER — ANESTHESIA EVENT (OUTPATIENT)
Dept: SURGERY | Age: 62
End: 2022-04-20
Payer: COMMERCIAL

## 2022-04-21 ENCOUNTER — HOSPITAL ENCOUNTER (OUTPATIENT)
Age: 62
Discharge: HOME HEALTH | End: 2022-04-21
Attending: ORTHOPAEDIC SURGERY | Admitting: ORTHOPAEDIC SURGERY
Payer: COMMERCIAL

## 2022-04-21 ENCOUNTER — ANESTHESIA (OUTPATIENT)
Dept: SURGERY | Age: 62
End: 2022-04-21
Payer: COMMERCIAL

## 2022-04-21 ENCOUNTER — HOSPITAL ENCOUNTER (OUTPATIENT)
Age: 62
Setting detail: OBSERVATION
Discharge: HOME HEALTH | End: 2022-04-22
Attending: INTERNAL MEDICINE | Admitting: INTERNAL MEDICINE
Payer: COMMERCIAL

## 2022-04-21 ENCOUNTER — APPOINTMENT (OUTPATIENT)
Dept: GENERAL RADIOLOGY | Age: 62
End: 2022-04-21
Attending: PHYSICIAN ASSISTANT
Payer: COMMERCIAL

## 2022-04-21 VITALS
SYSTOLIC BLOOD PRESSURE: 129 MMHG | BODY MASS INDEX: 28.7 KG/M2 | DIASTOLIC BLOOD PRESSURE: 70 MMHG | WEIGHT: 168.1 LBS | TEMPERATURE: 98.1 F | HEART RATE: 75 BPM | HEIGHT: 64 IN | RESPIRATION RATE: 16 BRPM | OXYGEN SATURATION: 98 %

## 2022-04-21 DIAGNOSIS — E78.2 MIXED HYPERLIPIDEMIA: ICD-10-CM

## 2022-04-21 DIAGNOSIS — R07.9 CHEST PAIN, UNSPECIFIED TYPE: ICD-10-CM

## 2022-04-21 DIAGNOSIS — Z96.651 S/P REVISION OF TOTAL KNEE, RIGHT: Primary | ICD-10-CM

## 2022-04-21 DIAGNOSIS — I10 ESSENTIAL HYPERTENSION WITH GOAL BLOOD PRESSURE LESS THAN 130/85: ICD-10-CM

## 2022-04-21 DIAGNOSIS — I25.83 CORONARY ARTERY DISEASE DUE TO LIPID RICH PLAQUE: ICD-10-CM

## 2022-04-21 DIAGNOSIS — I25.10 CORONARY ARTERY DISEASE DUE TO LIPID RICH PLAQUE: ICD-10-CM

## 2022-04-21 PROBLEM — Z96.659 FAILURE OF TOTAL KNEE ARTHROPLASTY (HCC): Status: ACTIVE | Noted: 2022-04-21

## 2022-04-21 PROBLEM — T84.018A FAILURE OF TOTAL KNEE ARTHROPLASTY (HCC): Status: ACTIVE | Noted: 2022-04-21

## 2022-04-21 LAB
ABO + RH BLD: NORMAL
ANION GAP SERPL CALC-SCNC: 8 MMOL/L (ref 7–16)
ATRIAL RATE: 76 BPM
BLOOD GROUP ANTIBODIES SERPL: NORMAL
BUN SERPL-MCNC: 14 MG/DL (ref 8–23)
CALCIUM SERPL-MCNC: 9.2 MG/DL (ref 8.3–10.4)
CALCULATED P AXIS, ECG09: 39 DEGREES
CALCULATED R AXIS, ECG10: 0 DEGREES
CALCULATED T AXIS, ECG11: -6 DEGREES
CHLORIDE SERPL-SCNC: 108 MMOL/L (ref 98–107)
CK SERPL-CCNC: 66 U/L (ref 21–215)
CO2 SERPL-SCNC: 26 MMOL/L (ref 21–32)
CREAT SERPL-MCNC: 0.9 MG/DL (ref 0.6–1)
DIAGNOSIS, 93000: NORMAL
ERYTHROCYTE [DISTWIDTH] IN BLOOD BY AUTOMATED COUNT: 14.1 % (ref 11.9–14.6)
GLUCOSE BLD STRIP.AUTO-MCNC: 120 MG/DL (ref 65–100)
GLUCOSE BLD STRIP.AUTO-MCNC: 148 MG/DL (ref 65–100)
GLUCOSE BLD STRIP.AUTO-MCNC: 177 MG/DL (ref 65–100)
GLUCOSE SERPL-MCNC: 148 MG/DL (ref 65–100)
HCT VFR BLD AUTO: 33.3 % (ref 35.8–46.3)
HGB BLD-MCNC: 10.9 G/DL (ref 11.7–15.4)
MCH RBC QN AUTO: 26.7 PG (ref 26.1–32.9)
MCHC RBC AUTO-ENTMCNC: 32.7 G/DL (ref 31.4–35)
MCV RBC AUTO: 81.6 FL (ref 79.6–97.8)
NRBC # BLD: 0 K/UL (ref 0–0.2)
P-R INTERVAL, ECG05: 166 MS
PLATELET # BLD AUTO: 229 K/UL (ref 150–450)
PMV BLD AUTO: 9.3 FL (ref 9.4–12.3)
POTASSIUM SERPL-SCNC: 4.3 MMOL/L (ref 3.5–5.1)
Q-T INTERVAL, ECG07: 424 MS
QRS DURATION, ECG06: 86 MS
QTC CALCULATION (BEZET), ECG08: 477 MS
RBC # BLD AUTO: 4.08 M/UL (ref 4.05–5.2)
SERVICE CMNT-IMP: ABNORMAL
SODIUM SERPL-SCNC: 142 MMOL/L (ref 136–145)
SPECIMEN EXP DATE BLD: NORMAL
TROPONIN-HIGH SENSITIVITY: 3.3 PG/ML (ref 0–14)
TROPONIN-HIGH SENSITIVITY: 4.5 PG/ML (ref 0–14)
TROPONIN-HIGH SENSITIVITY: 6 PG/ML (ref 0–14)
VENTRICULAR RATE, ECG03: 76 BPM
WBC # BLD AUTO: 9.2 K/UL (ref 4.3–11.1)

## 2022-04-21 PROCEDURE — 74011250636 HC RX REV CODE- 250/636

## 2022-04-21 PROCEDURE — 85027 COMPLETE CBC AUTOMATED: CPT

## 2022-04-21 PROCEDURE — 84484 ASSAY OF TROPONIN QUANT: CPT

## 2022-04-21 PROCEDURE — G0378 HOSPITAL OBSERVATION PER HR: HCPCS

## 2022-04-21 PROCEDURE — 77030006804 HC BLD SAW RECIP CNMD -B: Performed by: ORTHOPAEDIC SURGERY

## 2022-04-21 PROCEDURE — 77010033678 HC OXYGEN DAILY

## 2022-04-21 PROCEDURE — 74011250636 HC RX REV CODE- 250/636: Performed by: NURSE ANESTHETIST, CERTIFIED REGISTERED

## 2022-04-21 PROCEDURE — 77030006777 HC BLD SAW OSC CNMD -B: Performed by: ORTHOPAEDIC SURGERY

## 2022-04-21 PROCEDURE — 74011000250 HC RX REV CODE- 250: Performed by: NURSE PRACTITIONER

## 2022-04-21 PROCEDURE — 77030007880 HC KT SPN EPDRL BBMI -B: Performed by: ANESTHESIOLOGY

## 2022-04-21 PROCEDURE — 82962 GLUCOSE BLOOD TEST: CPT

## 2022-04-21 PROCEDURE — 74011250637 HC RX REV CODE- 250/637: Performed by: ANESTHESIOLOGY

## 2022-04-21 PROCEDURE — 74011250637 HC RX REV CODE- 250/637: Performed by: NURSE PRACTITIONER

## 2022-04-21 PROCEDURE — 76010000161 HC OR TIME 1 TO 1.5 HR INTENSV-TIER 1: Performed by: ORTHOPAEDIC SURGERY

## 2022-04-21 PROCEDURE — 74011250636 HC RX REV CODE- 250/636: Performed by: PHYSICIAN ASSISTANT

## 2022-04-21 PROCEDURE — 93005 ELECTROCARDIOGRAM TRACING: CPT | Performed by: NURSE PRACTITIONER

## 2022-04-21 PROCEDURE — 76210000019 HC OR PH I REC 4 TO 4.5 HR: Performed by: ORTHOPAEDIC SURGERY

## 2022-04-21 PROCEDURE — 74011636637 HC RX REV CODE- 636/637: Performed by: NURSE PRACTITIONER

## 2022-04-21 PROCEDURE — 73560 X-RAY EXAM OF KNEE 1 OR 2: CPT

## 2022-04-21 PROCEDURE — 36415 COLL VENOUS BLD VENIPUNCTURE: CPT

## 2022-04-21 PROCEDURE — 77030003665 HC NDL SPN BBMI -A: Performed by: ANESTHESIOLOGY

## 2022-04-21 PROCEDURE — 87075 CULTR BACTERIA EXCEPT BLOOD: CPT

## 2022-04-21 PROCEDURE — 87205 SMEAR GRAM STAIN: CPT

## 2022-04-21 PROCEDURE — 74011250636 HC RX REV CODE- 250/636: Performed by: NURSE PRACTITIONER

## 2022-04-21 PROCEDURE — 77030040830 HC CATH URETH FOL MDII -A: Performed by: ORTHOPAEDIC SURGERY

## 2022-04-21 PROCEDURE — 80048 BASIC METABOLIC PNL TOTAL CA: CPT

## 2022-04-21 PROCEDURE — 27486 REVISE/REPLACE KNEE JOINT: CPT | Performed by: ORTHOPAEDIC SURGERY

## 2022-04-21 PROCEDURE — 74011250637 HC RX REV CODE- 250/637: Performed by: PHYSICIAN ASSISTANT

## 2022-04-21 PROCEDURE — 99220 PR INITIAL OBSERVATION CARE/DAY 70 MINUTES: CPT | Performed by: INTERNAL MEDICINE

## 2022-04-21 PROCEDURE — 86900 BLOOD TYPING SEROLOGIC ABO: CPT

## 2022-04-21 PROCEDURE — 74011000250 HC RX REV CODE- 250: Performed by: ANESTHESIOLOGY

## 2022-04-21 PROCEDURE — 77030012935 HC DRSG AQUACEL BMS -B: Performed by: ORTHOPAEDIC SURGERY

## 2022-04-21 PROCEDURE — 77030020044 HC CLD THERAPY UNIT -B

## 2022-04-21 PROCEDURE — 77030000032 HC CUF TRNQT ZIMM -B: Performed by: ORTHOPAEDIC SURGERY

## 2022-04-21 PROCEDURE — 93005 ELECTROCARDIOGRAM TRACING: CPT

## 2022-04-21 PROCEDURE — 77030018836 HC SOL IRR NACL ICUM -A: Performed by: ORTHOPAEDIC SURGERY

## 2022-04-21 PROCEDURE — 77030038692 HC WND DEB SYS IRMX -B: Performed by: ORTHOPAEDIC SURGERY

## 2022-04-21 PROCEDURE — 2709999900 HC NON-CHARGEABLE SUPPLY: Performed by: ORTHOPAEDIC SURGERY

## 2022-04-21 PROCEDURE — 74011250636 HC RX REV CODE- 250/636: Performed by: ORTHOPAEDIC SURGERY

## 2022-04-21 PROCEDURE — 74011250636 HC RX REV CODE- 250/636: Performed by: ANESTHESIOLOGY

## 2022-04-21 PROCEDURE — 77030031139 HC SUT VCRL2 J&J -A: Performed by: ORTHOPAEDIC SURGERY

## 2022-04-21 PROCEDURE — 77030013819 HC MX SYS CEM ZIMM -B: Performed by: ORTHOPAEDIC SURGERY

## 2022-04-21 PROCEDURE — 76060000033 HC ANESTHESIA 1 TO 1.5 HR: Performed by: ORTHOPAEDIC SURGERY

## 2022-04-21 PROCEDURE — 77030013481 HC CUF TRNQT ZIMM -A: Performed by: ORTHOPAEDIC SURGERY

## 2022-04-21 PROCEDURE — 77030019557 HC ELECTRD VES SEAL MEDT -F: Performed by: ORTHOPAEDIC SURGERY

## 2022-04-21 PROCEDURE — 76942 ECHO GUIDE FOR BIOPSY: CPT | Performed by: ORTHOPAEDIC SURGERY

## 2022-04-21 PROCEDURE — 77030003602 HC NDL NRV BLK BBMI -B: Performed by: ANESTHESIOLOGY

## 2022-04-21 PROCEDURE — 77030037714 HC CLOSR DEV INCIS ZIP STRY -C: Performed by: ORTHOPAEDIC SURGERY

## 2022-04-21 PROCEDURE — 77030033067 HC SUT PDO STRATFX SPIR J&J -B: Performed by: ORTHOPAEDIC SURGERY

## 2022-04-21 PROCEDURE — 94760 N-INVAS EAR/PLS OXIMETRY 1: CPT

## 2022-04-21 PROCEDURE — 74011000250 HC RX REV CODE- 250: Performed by: ORTHOPAEDIC SURGERY

## 2022-04-21 PROCEDURE — C1776 JOINT DEVICE (IMPLANTABLE): HCPCS | Performed by: ORTHOPAEDIC SURGERY

## 2022-04-21 PROCEDURE — 74011000258 HC RX REV CODE- 258: Performed by: ORTHOPAEDIC SURGERY

## 2022-04-21 PROCEDURE — 82550 ASSAY OF CK (CPK): CPT

## 2022-04-21 PROCEDURE — 74011250637 HC RX REV CODE- 250/637

## 2022-04-21 PROCEDURE — 76010010054 HC POST OP PAIN BLOCK: Performed by: ORTHOPAEDIC SURGERY

## 2022-04-21 PROCEDURE — 77030037715 HC DRSG ZIP STRY -B: Performed by: ORTHOPAEDIC SURGERY

## 2022-04-21 PROCEDURE — 77030040922 HC BLNKT HYPOTHRM STRY -A: Performed by: ANESTHESIOLOGY

## 2022-04-21 PROCEDURE — 74011000250 HC RX REV CODE- 250: Performed by: NURSE ANESTHETIST, CERTIFIED REGISTERED

## 2022-04-21 DEVICE — JOURNEY II BCS CONSTRAINED                                    ARTICULAR INSERT SIZE 3-4 RIGHT 13MM
Type: IMPLANTABLE DEVICE | Site: KNEE | Status: FUNCTIONAL
Brand: JOURNEY

## 2022-04-21 RX ORDER — NITROGLYCERIN 0.4 MG/1
0.4 TABLET SUBLINGUAL
Status: DISCONTINUED | OUTPATIENT
Start: 2022-04-21 | End: 2022-04-22 | Stop reason: HOSPADM

## 2022-04-21 RX ORDER — ACETAMINOPHEN 500 MG
1000 TABLET ORAL ONCE
Status: COMPLETED | OUTPATIENT
Start: 2022-04-21 | End: 2022-04-21

## 2022-04-21 RX ORDER — METFORMIN HYDROCHLORIDE 500 MG/1
500 TABLET ORAL
Status: CANCELLED | OUTPATIENT
Start: 2022-04-21

## 2022-04-21 RX ORDER — OXYCODONE HYDROCHLORIDE 5 MG/1
5-10 TABLET ORAL
Status: CANCELLED | OUTPATIENT
Start: 2022-04-21

## 2022-04-21 RX ORDER — HYDROMORPHONE HYDROCHLORIDE 1 MG/ML
1 INJECTION, SOLUTION INTRAMUSCULAR; INTRAVENOUS; SUBCUTANEOUS
Status: CANCELLED | OUTPATIENT
Start: 2022-04-21

## 2022-04-21 RX ORDER — HYDROMORPHONE HYDROCHLORIDE 2 MG/ML
0.5 INJECTION, SOLUTION INTRAMUSCULAR; INTRAVENOUS; SUBCUTANEOUS
Status: DISCONTINUED | OUTPATIENT
Start: 2022-04-21 | End: 2022-04-21 | Stop reason: HOSPADM

## 2022-04-21 RX ORDER — OXYCODONE HYDROCHLORIDE 5 MG/1
5 TABLET ORAL
Status: DISCONTINUED | OUTPATIENT
Start: 2022-04-21 | End: 2022-04-21 | Stop reason: HOSPADM

## 2022-04-21 RX ORDER — KETOROLAC TROMETHAMINE 30 MG/ML
INJECTION, SOLUTION INTRAMUSCULAR; INTRAVENOUS AS NEEDED
Status: DISCONTINUED | OUTPATIENT
Start: 2022-04-21 | End: 2022-04-21 | Stop reason: HOSPADM

## 2022-04-21 RX ORDER — FENTANYL CITRATE 50 UG/ML
100 INJECTION, SOLUTION INTRAMUSCULAR; INTRAVENOUS ONCE
Status: DISCONTINUED | OUTPATIENT
Start: 2022-04-21 | End: 2022-04-21 | Stop reason: HOSPADM

## 2022-04-21 RX ORDER — ONDANSETRON 2 MG/ML
4 INJECTION INTRAMUSCULAR; INTRAVENOUS ONCE
Status: DISCONTINUED | OUTPATIENT
Start: 2022-04-21 | End: 2022-04-21 | Stop reason: HOSPADM

## 2022-04-21 RX ORDER — PANTOPRAZOLE SODIUM 40 MG/1
40 TABLET, DELAYED RELEASE ORAL DAILY
Status: DISCONTINUED | OUTPATIENT
Start: 2022-04-22 | End: 2022-04-22 | Stop reason: HOSPADM

## 2022-04-21 RX ORDER — NALOXONE HYDROCHLORIDE 0.4 MG/ML
0.1 INJECTION, SOLUTION INTRAMUSCULAR; INTRAVENOUS; SUBCUTANEOUS AS NEEDED
Status: DISCONTINUED | OUTPATIENT
Start: 2022-04-21 | End: 2022-04-21 | Stop reason: HOSPADM

## 2022-04-21 RX ORDER — ASPIRIN 81 MG/1
81 TABLET ORAL DAILY
Status: DISCONTINUED | OUTPATIENT
Start: 2022-04-22 | End: 2022-04-22

## 2022-04-21 RX ORDER — DEXAMETHASONE SODIUM PHOSPHATE 4 MG/ML
INJECTION, SOLUTION INTRA-ARTICULAR; INTRALESIONAL; INTRAMUSCULAR; INTRAVENOUS; SOFT TISSUE
Status: COMPLETED | OUTPATIENT
Start: 2022-04-21 | End: 2022-04-21

## 2022-04-21 RX ORDER — BUPIVACAINE HYDROCHLORIDE 7.5 MG/ML
INJECTION, SOLUTION INTRASPINAL
Status: COMPLETED | OUTPATIENT
Start: 2022-04-21 | End: 2022-04-21

## 2022-04-21 RX ORDER — NITROGLYCERIN 20 MG/100ML
5-20 INJECTION INTRAVENOUS
Status: DISCONTINUED | OUTPATIENT
Start: 2022-04-21 | End: 2022-04-21

## 2022-04-21 RX ORDER — SODIUM CHLORIDE, SODIUM LACTATE, POTASSIUM CHLORIDE, CALCIUM CHLORIDE 600; 310; 30; 20 MG/100ML; MG/100ML; MG/100ML; MG/100ML
100 INJECTION, SOLUTION INTRAVENOUS CONTINUOUS
Status: DISCONTINUED | OUTPATIENT
Start: 2022-04-21 | End: 2022-04-21 | Stop reason: HOSPADM

## 2022-04-21 RX ORDER — SODIUM CHLORIDE 0.9 % (FLUSH) 0.9 %
5-40 SYRINGE (ML) INJECTION EVERY 8 HOURS
Status: CANCELLED | OUTPATIENT
Start: 2022-04-21

## 2022-04-21 RX ORDER — ALBUTEROL SULFATE 0.83 MG/ML
2.5 SOLUTION RESPIRATORY (INHALATION) AS NEEDED
Status: DISCONTINUED | OUTPATIENT
Start: 2022-04-21 | End: 2022-04-21 | Stop reason: HOSPADM

## 2022-04-21 RX ORDER — CELECOXIB 200 MG/1
200 CAPSULE ORAL EVERY 12 HOURS
Status: CANCELLED | OUTPATIENT
Start: 2022-04-21

## 2022-04-21 RX ORDER — DIPHENHYDRAMINE HYDROCHLORIDE 50 MG/ML
12.5 INJECTION, SOLUTION INTRAMUSCULAR; INTRAVENOUS
Status: DISCONTINUED | OUTPATIENT
Start: 2022-04-21 | End: 2022-04-21 | Stop reason: HOSPADM

## 2022-04-21 RX ORDER — SODIUM CHLORIDE 0.9 % (FLUSH) 0.9 %
5-40 SYRINGE (ML) INJECTION AS NEEDED
Status: CANCELLED | OUTPATIENT
Start: 2022-04-21

## 2022-04-21 RX ORDER — ACETAMINOPHEN 650 MG/1
650 SUPPOSITORY RECTAL ONCE
Status: CANCELLED | OUTPATIENT
Start: 2022-04-21 | End: 2022-04-21

## 2022-04-21 RX ORDER — NITROGLYCERIN 0.4 MG/1
0.4 TABLET SUBLINGUAL
Status: CANCELLED | OUTPATIENT
Start: 2022-04-21

## 2022-04-21 RX ORDER — MORPHINE SULFATE 2 MG/ML
2 INJECTION, SOLUTION INTRAMUSCULAR; INTRAVENOUS
Status: COMPLETED | OUTPATIENT
Start: 2022-04-21 | End: 2022-04-21

## 2022-04-21 RX ORDER — SODIUM CHLORIDE 9 MG/ML
75 INJECTION, SOLUTION INTRAVENOUS CONTINUOUS
Status: DISCONTINUED | OUTPATIENT
Start: 2022-04-22 | End: 2022-04-22 | Stop reason: HOSPADM

## 2022-04-21 RX ORDER — MORPHINE SULFATE 2 MG/ML
INJECTION, SOLUTION INTRAMUSCULAR; INTRAVENOUS
Status: COMPLETED
Start: 2022-04-21 | End: 2022-04-21

## 2022-04-21 RX ORDER — DULOXETIN HYDROCHLORIDE 30 MG/1
90 CAPSULE, DELAYED RELEASE ORAL
Status: DISCONTINUED | OUTPATIENT
Start: 2022-04-21 | End: 2022-04-22 | Stop reason: HOSPADM

## 2022-04-21 RX ORDER — PROMETHAZINE HYDROCHLORIDE 25 MG/1
25 TABLET ORAL
Status: CANCELLED | OUTPATIENT
Start: 2022-04-21

## 2022-04-21 RX ORDER — LIDOCAINE HYDROCHLORIDE 10 MG/ML
0.1 INJECTION INFILTRATION; PERINEURAL AS NEEDED
Status: DISCONTINUED | OUTPATIENT
Start: 2022-04-21 | End: 2022-04-21 | Stop reason: HOSPADM

## 2022-04-21 RX ORDER — DEXAMETHASONE SODIUM PHOSPHATE 100 MG/10ML
10 INJECTION INTRAMUSCULAR; INTRAVENOUS ONCE
Status: CANCELLED | OUTPATIENT
Start: 2022-04-22 | End: 2022-04-22

## 2022-04-21 RX ORDER — PROPOFOL 10 MG/ML
INJECTION, EMULSION INTRAVENOUS AS NEEDED
Status: DISCONTINUED | OUTPATIENT
Start: 2022-04-21 | End: 2022-04-21 | Stop reason: HOSPADM

## 2022-04-21 RX ORDER — CEFAZOLIN SODIUM/WATER 2 G/20 ML
2 SYRINGE (ML) INTRAVENOUS EVERY 8 HOURS
Status: CANCELLED | OUTPATIENT
Start: 2022-04-21 | End: 2022-04-23

## 2022-04-21 RX ORDER — NITROGLYCERIN 0.4 MG/1
0.4 TABLET SUBLINGUAL ONCE
Status: COMPLETED | OUTPATIENT
Start: 2022-04-21 | End: 2022-04-21

## 2022-04-21 RX ORDER — ROPIVACAINE HYDROCHLORIDE 2 MG/ML
INJECTION, SOLUTION EPIDURAL; INFILTRATION; PERINEURAL
Status: COMPLETED | OUTPATIENT
Start: 2022-04-21 | End: 2022-04-21

## 2022-04-21 RX ORDER — CEFAZOLIN SODIUM/WATER 2 G/20 ML
2 SYRINGE (ML) INTRAVENOUS ONCE
Status: COMPLETED | OUTPATIENT
Start: 2022-04-21 | End: 2022-04-21

## 2022-04-21 RX ORDER — INSULIN LISPRO 100 [IU]/ML
INJECTION, SOLUTION INTRAVENOUS; SUBCUTANEOUS
Status: DISCONTINUED | OUTPATIENT
Start: 2022-04-21 | End: 2022-04-22 | Stop reason: HOSPADM

## 2022-04-21 RX ORDER — ROPIVACAINE HYDROCHLORIDE 2 MG/ML
INJECTION, SOLUTION EPIDURAL; INFILTRATION; PERINEURAL AS NEEDED
Status: DISCONTINUED | OUTPATIENT
Start: 2022-04-21 | End: 2022-04-21 | Stop reason: HOSPADM

## 2022-04-21 RX ORDER — ASPIRIN 81 MG/1
81 TABLET ORAL EVERY 12 HOURS
Status: CANCELLED | OUTPATIENT
Start: 2022-04-21

## 2022-04-21 RX ORDER — NALOXONE HYDROCHLORIDE 0.4 MG/ML
.2-.4 INJECTION, SOLUTION INTRAMUSCULAR; INTRAVENOUS; SUBCUTANEOUS
Status: CANCELLED | OUTPATIENT
Start: 2022-04-21

## 2022-04-21 RX ORDER — VANCOMYCIN HYDROCHLORIDE 1 G/20ML
INJECTION, POWDER, LYOPHILIZED, FOR SOLUTION INTRAVENOUS ONCE
Status: DISCONTINUED | OUTPATIENT
Start: 2022-04-21 | End: 2022-04-21 | Stop reason: DRUGHIGH

## 2022-04-21 RX ORDER — TRISODIUM CITRATE DIHYDRATE AND CITRIC ACID MONOHYDRATE 500; 334 MG/5ML; MG/5ML
30 SOLUTION ORAL
Status: COMPLETED | OUTPATIENT
Start: 2022-04-21 | End: 2022-04-21

## 2022-04-21 RX ORDER — ACETAMINOPHEN 325 MG/1
975 TABLET ORAL ONCE
Status: CANCELLED | OUTPATIENT
Start: 2022-04-21 | End: 2022-04-21

## 2022-04-21 RX ORDER — MIDAZOLAM HYDROCHLORIDE 1 MG/ML
2 INJECTION, SOLUTION INTRAMUSCULAR; INTRAVENOUS ONCE
Status: DISCONTINUED | OUTPATIENT
Start: 2022-04-21 | End: 2022-04-21 | Stop reason: HOSPADM

## 2022-04-21 RX ORDER — TRAZODONE HYDROCHLORIDE 50 MG/1
200 TABLET ORAL
Status: DISCONTINUED | OUTPATIENT
Start: 2022-04-21 | End: 2022-04-22 | Stop reason: HOSPADM

## 2022-04-21 RX ORDER — DIPHENHYDRAMINE HCL 25 MG
25 CAPSULE ORAL
Status: CANCELLED | OUTPATIENT
Start: 2022-04-21

## 2022-04-21 RX ORDER — OXYCODONE HYDROCHLORIDE 5 MG/1
10 TABLET ORAL
Status: DISCONTINUED | OUTPATIENT
Start: 2022-04-21 | End: 2022-04-21 | Stop reason: HOSPADM

## 2022-04-21 RX ORDER — VANCOMYCIN HYDROCHLORIDE
1250
Status: COMPLETED | OUTPATIENT
Start: 2022-04-21 | End: 2022-04-21

## 2022-04-21 RX ORDER — DULOXETIN HYDROCHLORIDE 30 MG/1
30 CAPSULE, DELAYED RELEASE ORAL
Status: CANCELLED | OUTPATIENT
Start: 2022-04-21

## 2022-04-21 RX ORDER — ACETAMINOPHEN 500 MG
1000 TABLET ORAL EVERY 6 HOURS
Status: CANCELLED | OUTPATIENT
Start: 2022-04-22

## 2022-04-21 RX ORDER — NITROGLYCERIN 0.4 MG/1
0.4 TABLET SUBLINGUAL
Status: COMPLETED | OUTPATIENT
Start: 2022-04-21 | End: 2022-04-21

## 2022-04-21 RX ORDER — CELECOXIB 200 MG/1
200 CAPSULE ORAL ONCE
Status: COMPLETED | OUTPATIENT
Start: 2022-04-21 | End: 2022-04-21

## 2022-04-21 RX ORDER — DULOXETIN HYDROCHLORIDE 60 MG/1
60 CAPSULE, DELAYED RELEASE ORAL
Status: DISCONTINUED | OUTPATIENT
Start: 2022-04-21 | End: 2022-04-21 | Stop reason: SDUPTHER

## 2022-04-21 RX ORDER — SODIUM CHLORIDE 0.9 % (FLUSH) 0.9 %
5-40 SYRINGE (ML) INJECTION EVERY 8 HOURS
Status: DISCONTINUED | OUTPATIENT
Start: 2022-04-21 | End: 2022-04-22 | Stop reason: HOSPADM

## 2022-04-21 RX ORDER — MORPHINE SULFATE 2 MG/ML
2 INJECTION, SOLUTION INTRAMUSCULAR; INTRAVENOUS
Status: DISCONTINUED | OUTPATIENT
Start: 2022-04-21 | End: 2022-04-22 | Stop reason: HOSPADM

## 2022-04-21 RX ORDER — ONDANSETRON 4 MG/1
8 TABLET, ORALLY DISINTEGRATING ORAL
Status: CANCELLED | OUTPATIENT
Start: 2022-04-21

## 2022-04-21 RX ORDER — SODIUM CHLORIDE 9 MG/ML
100 INJECTION, SOLUTION INTRAVENOUS CONTINUOUS
Status: CANCELLED | OUTPATIENT
Start: 2022-04-21 | End: 2022-04-22

## 2022-04-21 RX ORDER — MIDAZOLAM HYDROCHLORIDE 1 MG/ML
2 INJECTION, SOLUTION INTRAMUSCULAR; INTRAVENOUS
Status: COMPLETED | OUTPATIENT
Start: 2022-04-21 | End: 2022-04-21

## 2022-04-21 RX ORDER — AMOXICILLIN 250 MG
2 CAPSULE ORAL DAILY
Status: CANCELLED | OUTPATIENT
Start: 2022-04-22

## 2022-04-21 RX ORDER — PANTOPRAZOLE SODIUM 40 MG/1
40 TABLET, DELAYED RELEASE ORAL DAILY
Status: CANCELLED | OUTPATIENT
Start: 2022-04-21

## 2022-04-21 RX ORDER — DULOXETIN HYDROCHLORIDE 60 MG/1
60 CAPSULE, DELAYED RELEASE ORAL
Status: CANCELLED | OUTPATIENT
Start: 2022-04-21

## 2022-04-21 RX ORDER — NITROGLYCERIN 0.4 MG/1
TABLET SUBLINGUAL
Status: COMPLETED
Start: 2022-04-21 | End: 2022-04-21

## 2022-04-21 RX ADMIN — MORPHINE SULFATE 2 MG: 2 INJECTION, SOLUTION INTRAMUSCULAR; INTRAVENOUS at 15:04

## 2022-04-21 RX ADMIN — MIDAZOLAM 2 MG: 1 INJECTION INTRAMUSCULAR; INTRAVENOUS at 10:57

## 2022-04-21 RX ADMIN — SODIUM CHLORIDE, PRESERVATIVE FREE 10 ML: 5 INJECTION INTRAVENOUS at 17:59

## 2022-04-21 RX ADMIN — MORPHINE SULFATE 2 MG: 2 INJECTION, SOLUTION INTRAMUSCULAR; INTRAVENOUS at 18:04

## 2022-04-21 RX ADMIN — ROPIVACAINE HYDROCHLORIDE 40 MG: 2 INJECTION, SOLUTION EPIDURAL; INFILTRATION at 11:00

## 2022-04-21 RX ADMIN — MORPHINE SULFATE 2 MG: 2 INJECTION, SOLUTION INTRAMUSCULAR; INTRAVENOUS at 14:11

## 2022-04-21 RX ADMIN — Medication 3 AMPULE: at 09:55

## 2022-04-21 RX ADMIN — NITROGLYCERIN 0.5 INCH: 20 OINTMENT TOPICAL at 15:47

## 2022-04-21 RX ADMIN — SODIUM CHLORIDE, SODIUM LACTATE, POTASSIUM CHLORIDE, AND CALCIUM CHLORIDE: 600; 310; 30; 20 INJECTION, SOLUTION INTRAVENOUS at 12:10

## 2022-04-21 RX ADMIN — DULOXETINE HYDROCHLORIDE 90 MG: 30 CAPSULE, DELAYED RELEASE ORAL at 22:04

## 2022-04-21 RX ADMIN — PROPOFOL 50 MG: 10 INJECTION, EMULSION INTRAVENOUS at 11:39

## 2022-04-21 RX ADMIN — MORPHINE SULFATE 2 MG: 2 INJECTION, SOLUTION INTRAMUSCULAR; INTRAVENOUS at 22:05

## 2022-04-21 RX ADMIN — NITROGLYCERIN 0.4 MG: 0.4 TABLET SUBLINGUAL at 15:36

## 2022-04-21 RX ADMIN — CELECOXIB 200 MG: 200 CAPSULE ORAL at 09:55

## 2022-04-21 RX ADMIN — MORPHINE SULFATE 2 MG: 2 INJECTION, SOLUTION INTRAMUSCULAR; INTRAVENOUS at 14:18

## 2022-04-21 RX ADMIN — BUPIVACAINE HYDROCHLORIDE IN DEXTROSE 12.5 MG: 7.5 INJECTION, SOLUTION SUBARACHNOID at 11:29

## 2022-04-21 RX ADMIN — SODIUM CHLORIDE, SODIUM LACTATE, POTASSIUM CHLORIDE, AND CALCIUM CHLORIDE 100 ML/HR: 600; 310; 30; 20 INJECTION, SOLUTION INTRAVENOUS at 09:54

## 2022-04-21 RX ADMIN — NITROGLYCERIN 5 MCG/MIN: 20 INJECTION INTRAVENOUS at 13:47

## 2022-04-21 RX ADMIN — MORPHINE SULFATE 2 MG: 2 INJECTION, SOLUTION INTRAMUSCULAR; INTRAVENOUS at 14:28

## 2022-04-21 RX ADMIN — DEXAMETHASONE SODIUM PHOSPHATE 5 MG: 4 INJECTION, SOLUTION INTRAMUSCULAR; INTRAVENOUS at 11:00

## 2022-04-21 RX ADMIN — SODIUM CHLORIDE, SODIUM LACTATE, POTASSIUM CHLORIDE, AND CALCIUM CHLORIDE: 600; 310; 30; 20 INJECTION, SOLUTION INTRAVENOUS at 09:55

## 2022-04-21 RX ADMIN — TRAZODONE HYDROCHLORIDE 200 MG: 50 TABLET ORAL at 22:03

## 2022-04-21 RX ADMIN — INSULIN LISPRO 2 UNITS: 100 INJECTION, SOLUTION INTRAVENOUS; SUBCUTANEOUS at 21:59

## 2022-04-21 RX ADMIN — NITROGLYCERIN 0.4 MG: 0.4 TABLET SUBLINGUAL at 15:16

## 2022-04-21 RX ADMIN — Medication 2 G: at 11:21

## 2022-04-21 RX ADMIN — MORPHINE SULFATE 2 MG: 2 INJECTION, SOLUTION INTRAMUSCULAR; INTRAVENOUS at 14:43

## 2022-04-21 RX ADMIN — SODIUM CHLORIDE, PRESERVATIVE FREE 10 ML: 5 INJECTION INTRAVENOUS at 22:02

## 2022-04-21 RX ADMIN — NITROGLYCERIN 1 INCH: 20 OINTMENT TOPICAL at 18:04

## 2022-04-21 RX ADMIN — PROPOFOL 100 MCG/KG/MIN: 10 INJECTION, EMULSION INTRAVENOUS at 11:40

## 2022-04-21 RX ADMIN — Medication 1 G: at 11:33

## 2022-04-21 RX ADMIN — NITROGLYCERIN 0.4 MG: 0.4 TABLET, ORALLY DISINTEGRATING SUBLINGUAL at 15:00

## 2022-04-21 RX ADMIN — VANCOMYCIN HYDROCHLORIDE 1250 MG: 10 INJECTION, POWDER, LYOPHILIZED, FOR SOLUTION INTRAVENOUS at 10:45

## 2022-04-21 RX ADMIN — SODIUM CITRATE AND CITRIC ACID MONOHYDRATE 30 ML: 500; 334 SOLUTION ORAL at 14:53

## 2022-04-21 RX ADMIN — ACETAMINOPHEN 1000 MG: 500 TABLET, FILM COATED ORAL at 09:55

## 2022-04-21 RX ADMIN — NITROGLYCERIN 0.4 MG: 0.4 TABLET, ORALLY DISINTEGRATING SUBLINGUAL at 15:16

## 2022-04-21 NOTE — BRIEF OP NOTE
Brief Postoperative Note    Patient: Dirk Turner  YOB: 1960  MRN: [id number]    Date of Procedure: 4/21/2022     Pre-Op Diagnosis: Instability of internal right knee prosthesis, [facility] [T84.022A]    Post-Op Diagnosis: Same as preoperative diagnosis. Procedure(s):  RIGHT KNEE ARTHROPLASTY TOTAL REVISION/ SMITH/NEPHEW DEPUY    Surgeon(s):  Yosvany Butler MD    Surgical Assistant: Physician Assistant: CHRISTINA Torres    Anesthesia: Spinal     Estimated Blood Loss (mL): less than 50     TT : 18 minutes    Complications: None    Specimens:   ID Type Source Tests Collected by Time Destination   1 : Opening Right Knee Culture  Wound  CULTURE, ANAEROBIC, CULTURE, WOUND W Rowan Bennett MD 4/21/2022 1155 Microbiology   2 : Deep Right Knee Culture  Wound  CULTURE, ANAEROBIC, CULTURE, WOUND W Rowan Bennett MD 4/21/2022 1200 Microbiology   3 : Deep right knee culture Wound  CULTURE, ANAEROBIC, CULTURE, WOUND W Rowan Bennett MD 4/21/2022 1204 Microbiology        Implants:   Implant Name Type Inv.  Item Serial No.  Lot No. LRB No. Used Action   INSERT TIB SZ 3-4 [id number] R KNEE BI CRUCE [facility] - [id number]  INSERT TIB SZ 3-4 [id number] R KNEE BI CRUCE [employer] [facility] [employer]_ [id number] Right 1 Implanted       Drains: * No LDAs found *    Findings: Mid-flexion instability with well-fixed components/ Pre-op ROM 0-125/ Post-op 0-125    Electronically Signed by Carine Harry MD on 4/21/2022 at 12:21 PM

## 2022-04-21 NOTE — H&P
[facility] Cardiology History & Physical      Date of  Admission: No admission date for patient encounter. Primary Care Physician:  Dr. Jamal Dick  Primary Cardiologist:  Dr. Adela Robledo   Admitting Physician:  Dr. Dell Moralez     CC:  Chest pain     HPI:  Valarie Fox is a 65 y.o. female with PMH of CAD (PCI to LAD in 2019, [facility] 2019 showed moderate CAD with patent LAD stent), SVT, HTN, DSL, ROD, anxiety, and chronic pain, who presented to  for scheduled right knee arthroplasty total revision. Post op patient c/o chest pain that was across her chest and into her right arm and neck. She notes associated shortness of breath and diaphoresis. She was treated with SL nitro, IVP morphine, a nitro drip briefly. Nitro paste was applied when nitro drip . EKG showed SR with NSST changes, unchanged from last ekg. She was transferred to [facility] for further treatment. Initial hs trop 6.0.  CBC, BMP and repeat hs trop pending. On arrival her pain is improved. The chest pain is worse with palpation.       Cardiac History    2016 [facility] with PCI to LAD  2017 [facility] with moderate CAD, patent LAD stent  2019 [facility] with moderated CAD, patent LAD stent  6/22/2021 Echo with EF 60%     Past Medical History:   Diagnosis Date    Anemia     Anxiety     Arthritis     CAD (coronary artery disease)     1 stent placed in 2016, takes ASA 81 mg; cannot take statins; followed by [facility] cardiology     Chronic fatigue     sleep medicine referral    Chronic pain     feet and back, takes ibuprofen    DDD (degenerative disc disease), lumbar     Dyslipidemia     Essential hypertension     no meds     GERD (gastroesophageal reflux disease)     History of COVID-19 01/08/2022    not hospitalized     Insomnia     Leg pain     Localized edema     ankles, feet, knees    Mixed hyperlipidemia     Nausea & vomiting     post op nausea and vomiting    ROD (obstructive sleep apnea)     mild; has a c-pap but doesn't use     Pre-diabetes not checking bs at home; taking metformin daily ; A1C=5.6 on 3/29/22    Psychiatric disorder     anxiety    PUD (peptic ulcer disease)     S/P PTCA (percutaneous transluminal coronary angioplasty) 2016    LAD     Scoliosis     SOB (shortness of breath)     Spinal stenosis     SV[facility]ricular tachycardia) ([facility])     Unstable angina ([facility]       Past Surgical History:   Procedure Laterality Date    COLONOSCOPY N/A 5/21/2019    COLONOSCOPY/ BMI 30 ROOM 316 performed by Barbara Shafer MD at [address] [place] [address], [address] [place] [address]  05/2016    LAD stent 2016    HX HEART CATHETERIZATION  07/2017    no stents    HX HEART CATHETERIZATION  05/2019    no stents    HX HYSTERECTOMY  2002    HX KNEE REPLACEMENT Right 07/13/2021    HX LAP CHOLECYSTECTOMY  1994    HX ORTHOPAEDIC Left     left foot. scraped bone down for spurs    HX ORTHOPAEDIC Left 12/26/2019    Left second and third tarsometatarsal joint arthrodesis     HX TONSILLECTOMY      NEUROLOGICAL PROCEDURE UNLISTED  2001, 2000    neck surgery, herniated disk C4-C5, x2 6 months apart       Allergies   Allergen Reactions    Acetaminophen Itching     Pt denies    Hydrocodone Bitartrate Itching    Lortab [Hydrocodone-Acetaminophen] Rash     Rash and itch    Pcn [Penicillins] Rash    Statins-Hmg-Coa Reductase Inhibitors Other (comments)     Muscle aches      Social History     Socioeconomic History    Marital status:      Spouse name: Not on file    Number of children: Not on file    Years of education: Not on file    Highest education level: Not on file   Occupational History    Not on file   Tobacco Use    Smoking status: Never Smoker    Smokeless tobacco: Never Used   Vaping Use    Vaping Use: Never used   Substance and Sexual Activity    Alcohol use: No    Drug use: No    Sexual activity: Not on file   Other Topics Concern    Not on file   Social History Narrative    Not on file Social Determinants of Health     Financial Resource Strain:     Difficulty of Paying Living Expenses: Not on file   Food Insecurity:     Worried About Running Out of Food in the Last Year: Not on file    Keesha of Food in the Last Year: Not on file   Transportation Needs:     Lack of Transportation (Medical): Not on file    Lack of Transportation (Non-Medical): Not on file   Physical Activity:     Days of Exercise per Week: Not on file    Minutes of Exercise per Session: Not on file   Stress:     Feeling of Stress : Not on file   Social Connections:     Frequency of Communication with Friends and Family: Not on file    Frequency of Social Gatherings with Friends and Family: Not on file    Attends Yazidi Services: Not on file    Active Member of [address] [place] [address] [employer] or Organizations: Not on file    Attends Club or Organization Meetings: Not on file    Marital Status: Not on file   Intimate Partner Violence:     Fear of Current or Ex-Partner: Not on file    Emotionally Abused: Not on file    Physically Abused: Not on file    Sexually Abused: Not on file   Housing Stability:     Unable to Pay for Housing in the Last Year: Not on file    Number of [place] in the Last Year: Not on file    Unstable Housing in the Last Year: Not on file     Family History   Problem Relation Age of Onset    Diabetes Mother     Heart Disease Mother     Kidney Disease Mother     Heart Disease Father     Breast Cancer Neg Hx         No current facility-administered medications for this encounter. No current outpatient medications on file.      Facility-Administered Medications Ordered in Other Encounters   Medication Dose Route Frequency    lactated Ringers infusion  100 mL/hr IntraVENous CONTINUOUS    albuterol (PROVENTIL VENTOLIN) nebulizer solution 2.5 mg  2.5 mg Inhalation PRN    oxyCODONE IR (ROXICODONE) tablet 5 mg  5 mg Oral ONCE PRN    oxyCODONE IR (ROXICODONE) tablet 10 mg  10 mg Oral ONCE PRN    HYDROmorphone (DILAUDID) injection 0.5 mg  0.5 mg IntraVENous Multiple    naloxone (NARCAN) injection 0.1 mg  0.1 mg IntraVENous PRN    ondansetron (ZOFRAN) injection 4 mg  4 mg IntraVENous ONCE    diphenhydrAMINE (BENADRYL) injection 12.5 mg  12.5 mg IntraVENous ONCE PRN       Review of Systems    Review of Systems   Constitutional: Positive for diaphoresis. HENT: Negative. Eyes: Negative. Cardiovascular: Positive for chest pain. Respiratory: Positive for shortness of breath. Endocrine: Negative. Skin: Negative. Musculoskeletal: Negative. Gastrointestinal: Negative. Genitourinary: Negative. Neurological: Negative. Psychiatric/Behavioral: Negative. Allergic/Immunologic: Negative. Subjective:     Visit Vitals  /79   Pulse 78   Temp 98.4 °F (36.9 °C)   Resp 17   SpO2 99%     Physical Exam  Eyes:      Pupils: Pupils are equal, round, and reactive to light. Cardiovascular:      Rate and Rhythm: Normal rate and regular rhythm. Pulmonary:      Effort: Pulmonary effort is normal.      Breath sounds: Normal breath sounds. Abdominal:      General: Bowel sounds are normal.   Musculoskeletal:         General: Normal range of motion. Skin:     General: Skin is warm and dry. Neurological:      General: No focal deficit present. Mental Status: She is alert and oriented to person, place, and time. Psychiatric:         Mood and Affect: Mood normal.         Behavior: Behavior normal.         Thought Content:  Thought content normal.         Judgment: Judgment normal.         Cardiographics  Telemetry: normal sinus rhythm  ECG: normal sinus rhythm, nonspecific ST and T waves changes  Echocardiogram: pending    Labs:   Recent Results (from the past 24 hour(s))   TYPE & SCREEN    Collection Time: 04/21/22 10:01 AM   Result Value Ref Range    Crossmatch Expiration 04/24/2022,2359     ABO/Rh(D) A POSITIVE     Antibody screen NEG    GLUCOSE, POC    Collection Time: 04/21/22 10:03 AM   Result Value Ref Range    Glucose (POC) 120 (H) 65 - 100 mg/dL    Performed by Megan Stokes SENSITIVITY    Collection Time: 04/21/22  1:33 PM   Result Value Ref Range    Troponin-High Sensitivity 6.0 0 - 14 pg/mL   CK    Collection Time: 04/21/22  1:33 PM   Result Value Ref Range    CK 66 21 - 215 U/L       Patient has been seen and examined by Dr. Dell Moralez and he agrees with the following assessment and plan:     Assessment/Plan:       Principal Problem:    Chest pain   -- admit for observation  -- trend troponin levels  -- repeat EKG  -- nitro paste to chest   -- continue ASA, no statin due to intolerance   -- will hold on start heparin drip given negative troponin, surgery also request avoiding if possible, however if troponin becomes positive will need to consider starting drip    Active Problems:    Coronary artery disease due to lipid rich plaque   -- see above       Mixed hyperlipidemia   -- no statin due to intolerance       SVT (supraventricular tachycardia)   -- no c/o palpitations       ROD (obstructive sleep apnea)       Anxiety  -- home meds ordered       S/P knee revision  -- consult ortho    Sonja Atwood NP  4/21/2022 4:56 PM    Attending Addendum    Patient independently seen and examined by me.   Agree with above note by physician extender with the following additions and exceptions: 65 y.o. female with PMH of CAD (PCI to LAD in 2019, [facility] 2019 showed moderate CAD with patent LAD stent), SVT, HTN, DSL, ROD, anxiety, and chronic pain, who presented to  for scheduled right knee arthroplasty total revision with post op chest pain, normal troponin, no EKG changes    Key findings are:  No CP or TAVERAS  CV- RRR without murmur  Lungs- Clear bilaterally  Ext- no edema    Plan: cycle troponins, ASA, NTG, check TTE       --unless Pt develops new EKG changes or elevated troponin will not start empiric heparin        --further plan as noted above and pending clinical course    [facility].  [address] [facility] Cardiology

## 2022-04-21 NOTE — ANESTHESIA PROCEDURE NOTES
Spinal Block    Start time: 4/21/2022 11:25 AM  End time: 4/21/2022 11:29 AM  Performed by: Peyton Mccracken MD  Authorized by: Peyton Mccracken MD     Pre-procedure:   Indications: primary anesthetic  Preanesthetic Checklist: patient identified, risks and benefits discussed, anesthesia consent, site marked, patient being monitored and timeout performed    Timeout Time: 11:25 EDT          Spinal Block:   Patient Position:  Seated  Prep Region:  Lumbar  Prep: chlorhexidine      Location:  L3-4  Technique:  Single shot    Local Dose (mL):  3    Needle:   Needle Type:  Pencan  Needle Gauge:  25 G  Attempts:  1      Events: CSF confirmed, no blood with aspiration and no paresthesia        Assessment:  Insertion:  Uncomplicated  Patient tolerance:  Patient tolerated the procedure well with no immediate complications

## 2022-04-21 NOTE — PROGRESS NOTES
Bedside and Verbal shift change report given to self (oncoming nurse) by Damon Pozo RN (offgoing nurse). Report included the following information SBAR, Kardex, ED Summary, Procedure Summary, Intake/Output, MAR, Recent Results and Cardiac Rhythm NS.

## 2022-04-21 NOTE — ANESTHESIA PREPROCEDURE EVALUATION
Relevant Problems   RESPIRATORY SYSTEM   (+) ROD (obstructive sleep apnea)   (+) SOB (shortness of breath)      CARDIOVASCULAR   (+) Coronary artery disease due to lipid rich plaque   (+) Essential hypertension with goal blood pressure less than 130/85   (+) SVT (supraventricular tachycardia) (HCC)   (+) Unstable angina (HCC)      ENDOCRINE   (+) Arthritis of right knee       Anesthetic History     PONV          Review of Systems / Medical History  Patient summary reviewed and pertinent labs reviewed    Pulmonary                   Neuro/Psych              Cardiovascular    Hypertension        Dysrhythmias : SVT  CAD, cardiac stents and hyperlipidemia    Exercise tolerance: >4 METS     GI/Hepatic/Renal     GERD: well controlled           Endo/Other        Arthritis     Other Findings              Physical Exam    Airway  Mallampati: II  TM Distance: > 6 cm  Neck ROM: normal range of motion   Mouth opening: Normal     Cardiovascular    Rhythm: regular           Dental    Dentition: Full lower dentures and Lower partial plate     Pulmonary                 Abdominal  GI exam deferred       Other Findings            Anesthetic Plan    ASA: 3  Anesthesia type: spinal - femoral single shot      Post-op pain plan if not by surgeon: peripheral nerve block single    Induction: Intravenous  Anesthetic plan and risks discussed with: Patient

## 2022-04-21 NOTE — PROGRESS NOTES
TRANSFER - IN REPORT:    Verbal report received from [address] [place] [address] [place], RN (name) on Rolanda Hoyos  being received from [facility] for routine progression of care      Report consisted of patients Situation, Background, Assessment and   Recommendations(SBAR). Information from the following report(s) SBAR, Kardex, OR Summary, Procedure Summary, MAR and Recent Results was reviewed with the receiving nurse. Opportunity for questions and clarification was provided. Dual skin assessment completed and revealed the following: all skin folds assessed and intact. Heels/sacrum assessed and intact. R knee to shin covered with aquacele dressing. Dressing CDI. Abrasions on SONNY.

## 2022-04-21 NOTE — ANESTHESIA PROCEDURE NOTES
Peripheral Block    Start time: 4/21/2022 10:58 AM  End time: 4/21/2022 11:00 AM  Performed by: Karla Rhodes MD  Authorized by: Karla Rhodes MD       Pre-procedure: Indications: at surgeon's request and post-op pain management    Preanesthetic Checklist: patient identified, risks and benefits discussed, site marked, timeout performed, anesthesia consent given and patient being monitored    Timeout Time: 10:58 EDT          Block Type:   Block Type:   Adductor canal  Laterality:  Right  Monitoring:  Standard ASA monitoring, continuous pulse ox, frequent vital sign checks, heart rate, oxygen and responsive to questions  Injection Technique:  Single shot  Procedures: ultrasound guided    Patient Position: supine  Prep: chlorhexidine    Location:  Mid thigh  Needle Type:  Stimuplex  Needle Gauge:  21 G  Needle Localization:  Ultrasound guidance and anatomical landmarks  Medication Injected:  Ropivacaine (NAROPIN) 2 mg/mL (0.2 %) injection, 40 mg  dexamethasone (DECADRON) 4 mg/mL injection, 5 mg  Med Admin Time: 4/21/2022 11:00 AM    Assessment:  Number of attempts:  1  Injection Assessment:  Incremental injection every 5 mL, local visualized surrounding nerve on ultrasound, negative aspiration for blood, no paresthesia, no intravascular symptoms and ultrasound image on chart  Patient tolerance:  Patient tolerated the procedure well with no immediate complications

## 2022-04-21 NOTE — ANESTHESIA POSTPROCEDURE EVALUATION
Procedure(s):  RIGHT KNEE ARTHROPLASTY TOTAL REVISION/ SMITH/NEPHEW DEPUY. spinal    Anesthesia Post Evaluation      Multimodal analgesia: multimodal analgesia used between 6 hours prior to anesthesia start to PACU discharge  Patient location during evaluation: PACU  Patient participation: complete - patient participated  Level of consciousness: awake  Pain management: adequate  Airway patency: patent  Anesthetic complications: no  Cardiovascular status: acceptable  Respiratory status: acceptable  Hydration status: acceptable  Post anesthesia nausea and vomiting:  controlled      INITIAL Post-op Vital signs:   Vitals Value Taken Time   /70 04/21/22 1630   Temp 36.7 °C (98.1 °F) 04/21/22 1244   Pulse 95 04/21/22 1645   Resp 16 04/21/22 1630   SpO2 99 % 04/21/22 1643   Vitals shown include unvalidated device data.

## 2022-04-21 NOTE — PERIOP NOTES
/92, increased nitro gtt to 10 mcg/min. Dr. Magdalene Patino at bedside. Patient with 8/10 chest pain.
12 lead ekg given to Dr. Iwona Camacho. He is reviewing prior EKGs in system.
12 leak EKG from 3/29/22 @ 0956  reviewed by MAYCOL Sumner on 4/21/2022 @ 1400 for comparison with new 12 lead EKG
Cardiology consulted per MAYCOL Zheng.     CK and Troponin to be drawn
Dr. Jo Cleburne at bedside speaking with patient. New order for Wero. OK to continue giving morphine for chest pain. Nitro gtt at 20 mcg/min.
Dr. Key Gonzales at bedside speaking with patient.
Dr. Sanjuana Salomon at bedside. Pt's sons at bedside being updated. Pt HR now in one-teens, pressure 104/59, apply 1/2 inch nitro paste. Pt will be transferred downtown to telemetry.
Dr. Shakira Narayan has updated patient's son. We have notified Dr. Shakira Narayan of lab results ([facility] cardiac labs).
Eligio MADISON) with Dr. Katt Arteaga notified that patient will be transferred downtown for cardiac reasons. He spoke with MAYCOL Cancino  earlier today regarding the patient.
Erin wyatt ETA 30 mins. [facility] notified.
Labs being drawn now. Cardiology consult entered. Pt on O2. Nitro gtt ordered for pain relief.
Orders rec'd from Dr. Brinda Lara to consult cardiology, keep pt on 2L n/c, cardiac enzymes, and start nitro gtt.
PT'S BELONGINGS IN 1 BAG LABELED AND PLACED IN THE LOCKER ROOM.
PT'S SON TEENA MAY BE REACHED -[phone].
Patient took an 81 mg ASA this morning preop. Dr. Alisha Shepherd aware. Just gave sublingual nitro. Will give another dose of morphine.
Please note that I am starting a nitro gtt in regular IV pump tubing because there is no nitroglycerine tubing in this facility. I have checked with ICU and ER and they do not use special tubing for nitro gtt.
Results for Norm Katelynn" (MRN [id number]) as of 4/21/2022 14:08   Ref.  Range 4/21/2022 13:33   CK Latest Ref Range: 21 - 215 U/L 66   Troponin-High Sensitivity Latest Ref Range: 0 - 14 pg/mL 6.0
Spoke with Rubén Sharma on 3rd floor downtown. Patient will be on telemetry and Dr. Daren Goldstein will be accepting the patient. Spoke with transfer line RN, will need to call us back to set up transport    Emtala will be signed appropriately. RN will call report to receiving RN.
TRANSFER - OUT REPORT:    Verbal report given to MERLIN Driver on Gabby Smallwood  being transferred to [address] [place] [address]  for urgent transfer       Report consisted of patients Situation, Background, Assessment and   Recommendations(SBAR). Information from the following report(s) SBAR, Procedure Summary, Intake/Output, MAR, Recent Results and Cardiac Rhythm SR was reviewed with the receiving nurse. Lines:   Peripheral IV 04/21/22 Posterior;Right Hand (Active)   Site Assessment Clean, dry, & intact 04/21/22 1244   Phlebitis Assessment 0 04/21/22 1244   Infiltration Assessment 0 04/21/22 1244   Dressing Status Clean, dry, & intact 04/21/22 1244   Dressing Type Tape;Transparent 04/21/22 1244   Hub Color/Line Status Green; Infusing;Patent 04/21/22 1244   Action Taken Blood drawn 04/21/22 0952   Alcohol Cap Used No 04/21/22 9304        Opportunity for questions and clarification was provided.       Patient transported with:   O2 @ 3 liters
Teach back method used in review of Hibiclens usage preop/postop, TB screening, pain management goals, falls precautions and use of Nozin for prevention of staph infections. Incentive spirometer reviewed and located in pt's belongings.
Statement Selected

## 2022-04-22 ENCOUNTER — APPOINTMENT (OUTPATIENT)
Dept: NON INVASIVE DIAGNOSTICS | Age: 62
End: 2022-04-22
Attending: NURSE PRACTITIONER
Payer: COMMERCIAL

## 2022-04-22 VITALS
DIASTOLIC BLOOD PRESSURE: 71 MMHG | SYSTOLIC BLOOD PRESSURE: 120 MMHG | HEART RATE: 75 BPM | RESPIRATION RATE: 17 BRPM | OXYGEN SATURATION: 98 % | WEIGHT: 168.2 LBS | BODY MASS INDEX: 28.87 KG/M2 | TEMPERATURE: 97.4 F

## 2022-04-22 LAB
ANION GAP SERPL CALC-SCNC: 4 MMOL/L (ref 7–16)
BUN SERPL-MCNC: 14 MG/DL (ref 8–23)
CALCIUM SERPL-MCNC: 9.1 MG/DL (ref 8.3–10.4)
CHLORIDE SERPL-SCNC: 110 MMOL/L (ref 98–107)
CHOLEST SERPL-MCNC: 182 MG/DL
CO2 SERPL-SCNC: 29 MMOL/L (ref 21–32)
CREAT SERPL-MCNC: 0.7 MG/DL (ref 0.6–1)
ECHO AO ASC DIAM: 3.5 CM
ECHO AO ASCENDING AORTA INDEX: 1.92 CM/M2
ECHO AO ROOT DIAM: 3.5 CM
ECHO AO ROOT INDEX: 1.92 CM/M2
ECHO AV AREA PEAK VELOCITY: 2.1 CM2
ECHO AV AREA VTI: 2.3 CM2
ECHO AV AREA/BSA PEAK VELOCITY: 1.2 CM2/M2
ECHO AV AREA/BSA VTI: 1.3 CM2/M2
ECHO AV MEAN GRADIENT: 4 MMHG
ECHO AV MEAN VELOCITY: 0.9 M/S
ECHO AV PEAK GRADIENT: 8 MMHG
ECHO AV PEAK VELOCITY: 1.4 M/S
ECHO AV VELOCITY RATIO: 0.86
ECHO AV VTI: 32.3 CM
ECHO EST RA PRESSURE: 3 MMHG
ECHO IVC PROX: 2 CM
ECHO LA AREA 2C: 14.9 CM2
ECHO LA AREA 4C: 22.4 CM2
ECHO LA DIAMETER INDEX: 1.87 CM/M2
ECHO LA DIAMETER: 3.4 CM
ECHO LA MAJOR AXIS: 6.1 CM
ECHO LA MINOR AXIS: 5.2 CM
ECHO LA TO AORTIC ROOT RATIO: 0.97
ECHO LA VOL BP: 52 ML (ref 22–52)
ECHO LA VOL/BSA BIPLANE: 29 ML/M2 (ref 16–34)
ECHO LV E' LATERAL VELOCITY: 10 CM/S
ECHO LV E' SEPTAL VELOCITY: 9 CM/S
ECHO LV EDV A2C: 42 ML
ECHO LV EDV A4C: 66 ML
ECHO LV EDV INDEX A4C: 36 ML/M2
ECHO LV EDV NDEX A2C: 23 ML/M2
ECHO LV EJECTION FRACTION A2C: 57 %
ECHO LV EJECTION FRACTION A4C: 68 %
ECHO LV EJECTION FRACTION BIPLANE: 62 % (ref 55–100)
ECHO LV ESV A2C: 18 ML
ECHO LV ESV A4C: 21 ML
ECHO LV ESV INDEX A2C: 10 ML/M2
ECHO LV ESV INDEX A4C: 12 ML/M2
ECHO LV FRACTIONAL SHORTENING: 36 % (ref 28–44)
ECHO LV INTERNAL DIMENSION DIASTOLE INDEX: 2.47 CM/M2
ECHO LV INTERNAL DIMENSION DIASTOLIC: 4.5 CM (ref 3.9–5.3)
ECHO LV INTERNAL DIMENSION SYSTOLIC INDEX: 1.59 CM/M2
ECHO LV INTERNAL DIMENSION SYSTOLIC: 2.9 CM
ECHO LV IVSD: 0.9 CM (ref 0.6–0.9)
ECHO LV MASS 2D: 123.1 G (ref 67–162)
ECHO LV MASS INDEX 2D: 67.6 G/M2 (ref 43–95)
ECHO LV POSTERIOR WALL DIASTOLIC: 0.8 CM (ref 0.6–0.9)
ECHO LV RELATIVE WALL THICKNESS RATIO: 0.36
ECHO LVOT AREA: 2.5 CM2
ECHO LVOT AV VTI INDEX: 0.89
ECHO LVOT DIAM: 1.8 CM
ECHO LVOT MEAN GRADIENT: 3 MMHG
ECHO LVOT PEAK GRADIENT: 6 MMHG
ECHO LVOT PEAK VELOCITY: 1.2 M/S
ECHO LVOT STROKE VOLUME INDEX: 40 ML/M2
ECHO LVOT SV: 72.7 ML
ECHO LVOT VTI: 28.6 CM
ECHO MV A VELOCITY: 1 M/S
ECHO MV AREA VTI: 2.3 CM2
ECHO MV E DECELERATION TIME (DT): 182 MS
ECHO MV E VELOCITY: 1.03 M/S
ECHO MV E/A RATIO: 1.03
ECHO MV E/E' LATERAL: 10.3
ECHO MV E/E' RATIO (AVERAGED): 10.87
ECHO MV E/E' SEPTAL: 11.44
ECHO MV LVOT VTI INDEX: 1.11
ECHO MV MAX VELOCITY: 1.2 M/S
ECHO MV MEAN GRADIENT: 3 MMHG
ECHO MV MEAN VELOCITY: 0.8 M/S
ECHO MV PEAK GRADIENT: 5 MMHG
ECHO MV VTI: 31.8 CM
ECHO PV ACCELERATION TIME (AT): 153 MS
ECHO PV MAX VELOCITY: 1.1 M/S
ECHO PV PEAK GRADIENT: 5 MMHG
ECHO RIGHT VENTRICULAR SYSTOLIC PRESSURE (RVSP): 20 MMHG
ECHO RV BASAL DIMENSION: 3 CM
ECHO RV INTERNAL DIMENSION: 3.7 CM
ECHO RV TAPSE: 1.9 CM (ref 1.7–?)
ECHO TV REGURGITANT MAX VELOCITY: 2.04 M/S
ECHO TV REGURGITANT PEAK GRADIENT: 17 MMHG
ERYTHROCYTE [DISTWIDTH] IN BLOOD BY AUTOMATED COUNT: 14.2 % (ref 11.9–14.6)
GLUCOSE BLD STRIP.AUTO-MCNC: 117 MG/DL (ref 65–100)
GLUCOSE BLD STRIP.AUTO-MCNC: 120 MG/DL (ref 65–100)
GLUCOSE BLD STRIP.AUTO-MCNC: 142 MG/DL (ref 65–100)
GLUCOSE SERPL-MCNC: 139 MG/DL (ref 65–100)
HCT VFR BLD AUTO: 29.5 % (ref 35.8–46.3)
HDLC SERPL-MCNC: 52 MG/DL (ref 40–60)
HDLC SERPL: 3.5 {RATIO}
HGB BLD-MCNC: 9.5 G/DL (ref 11.7–15.4)
LDLC SERPL CALC-MCNC: 112 MG/DL
MCH RBC QN AUTO: 26.6 PG (ref 26.1–32.9)
MCHC RBC AUTO-ENTMCNC: 32.2 G/DL (ref 31.4–35)
MCV RBC AUTO: 82.6 FL (ref 79.6–97.8)
NRBC # BLD: 0 K/UL (ref 0–0.2)
PLATELET # BLD AUTO: 238 K/UL (ref 150–450)
PMV BLD AUTO: 9.4 FL (ref 9.4–12.3)
POTASSIUM SERPL-SCNC: 4.1 MMOL/L (ref 3.5–5.1)
RBC # BLD AUTO: 3.57 M/UL (ref 4.05–5.2)
SERVICE CMNT-IMP: ABNORMAL
SODIUM SERPL-SCNC: 143 MMOL/L (ref 136–145)
TRIGL SERPL-MCNC: 90 MG/DL (ref 35–150)
VLDLC SERPL CALC-MCNC: 18 MG/DL (ref 6–23)
WBC # BLD AUTO: 10.3 K/UL (ref 4.3–11.1)

## 2022-04-22 PROCEDURE — 74011250637 HC RX REV CODE- 250/637: Performed by: ORTHOPAEDIC SURGERY

## 2022-04-22 PROCEDURE — 99225 PR SBSQ OBSERVATION CARE/DAY 25 MINUTES: CPT | Performed by: INTERNAL MEDICINE

## 2022-04-22 PROCEDURE — 80048 BASIC METABOLIC PNL TOTAL CA: CPT

## 2022-04-22 PROCEDURE — 74011250636 HC RX REV CODE- 250/636: Performed by: NURSE PRACTITIONER

## 2022-04-22 PROCEDURE — 82962 GLUCOSE BLOOD TEST: CPT

## 2022-04-22 PROCEDURE — 74011000250 HC RX REV CODE- 250: Performed by: NURSE PRACTITIONER

## 2022-04-22 PROCEDURE — 80061 LIPID PANEL: CPT

## 2022-04-22 PROCEDURE — 36415 COLL VENOUS BLD VENIPUNCTURE: CPT

## 2022-04-22 PROCEDURE — 97161 PT EVAL LOW COMPLEX 20 MIN: CPT

## 2022-04-22 PROCEDURE — 93306 TTE W/DOPPLER COMPLETE: CPT

## 2022-04-22 PROCEDURE — 85027 COMPLETE CBC AUTOMATED: CPT

## 2022-04-22 PROCEDURE — 97530 THERAPEUTIC ACTIVITIES: CPT

## 2022-04-22 PROCEDURE — 74011250637 HC RX REV CODE- 250/637: Performed by: NURSE PRACTITIONER

## 2022-04-22 PROCEDURE — G0378 HOSPITAL OBSERVATION PER HR: HCPCS

## 2022-04-22 RX ORDER — CEFAZOLIN SODIUM/WATER 2 G/20 ML
2 SYRINGE (ML) INTRAVENOUS
Status: DISCONTINUED | OUTPATIENT
Start: 2022-04-22 | End: 2022-04-22

## 2022-04-22 RX ORDER — ACETAMINOPHEN 325 MG/1
650 TABLET ORAL 4 TIMES DAILY
Status: DISCONTINUED | OUTPATIENT
Start: 2022-04-22 | End: 2022-04-22 | Stop reason: HOSPADM

## 2022-04-22 RX ORDER — ASPIRIN 81 MG/1
81 TABLET ORAL 2 TIMES DAILY
Status: DISCONTINUED | OUTPATIENT
Start: 2022-04-22 | End: 2022-04-22 | Stop reason: HOSPADM

## 2022-04-22 RX ORDER — OXYCODONE HYDROCHLORIDE 5 MG/1
5-10 TABLET ORAL
Status: DISCONTINUED | OUTPATIENT
Start: 2022-04-22 | End: 2022-04-22 | Stop reason: HOSPADM

## 2022-04-22 RX ORDER — OXYCODONE HYDROCHLORIDE 5 MG/1
5-10 TABLET ORAL
Qty: 60 TABLET | Refills: 0 | Status: SHIPPED | OUTPATIENT
Start: 2022-04-22 | End: 2022-04-29

## 2022-04-22 RX ORDER — ASPIRIN 81 MG/1
81 TABLET ORAL 2 TIMES DAILY
Qty: 60 TABLET | Refills: 0 | Status: SHIPPED | OUTPATIENT
Start: 2022-04-22 | End: 2022-05-22

## 2022-04-22 RX ADMIN — PANTOPRAZOLE SODIUM 40 MG: 40 TABLET, DELAYED RELEASE ORAL at 09:06

## 2022-04-22 RX ADMIN — ASPIRIN 81 MG: 81 TABLET ORAL at 09:06

## 2022-04-22 RX ADMIN — OXYCODONE 5 MG: 5 TABLET ORAL at 09:13

## 2022-04-22 RX ADMIN — SODIUM CHLORIDE, PRESERVATIVE FREE 10 ML: 5 INJECTION INTRAVENOUS at 13:50

## 2022-04-22 RX ADMIN — ACETAMINOPHEN 650 MG: 325 TABLET ORAL at 09:06

## 2022-04-22 RX ADMIN — NITROGLYCERIN 1 INCH: 20 OINTMENT TOPICAL at 00:01

## 2022-04-22 RX ADMIN — OXYCODONE 5 MG: 5 TABLET ORAL at 13:49

## 2022-04-22 RX ADMIN — ACETAMINOPHEN 650 MG: 325 TABLET ORAL at 13:49

## 2022-04-22 RX ADMIN — SODIUM CHLORIDE 75 ML/HR: 900 INJECTION, SOLUTION INTRAVENOUS at 05:58

## 2022-04-22 RX ADMIN — SODIUM CHLORIDE, PRESERVATIVE FREE 10 ML: 5 INJECTION INTRAVENOUS at 05:59

## 2022-04-22 NOTE — DISCHARGE SUMMARY
[facility] Cardiology Discharge Summary     Patient ID:  Celso Yu  [id number]  87 y.o.  1960    Admit date: 4/21/2022    Discharge date:  04/22/2022    Admitting Physician: Alejandro Thomas MD     Discharge Physician: Elsie Zavaleta NP/Dr. Gareth Quiñonez    Admission Diagnoses: Chest pain [R07.9]    Discharge Diagnoses:    Diagnosis    Failure of total knee arthroplasty ([address]    ROD (obstructive sleep apnea)    Anxiety    Nocturnal hypoxemia    Arthritis of right knee    SVT (supraventricular tachycardia) (HCC)    Chronic fatigue    Essential hypertension with goal blood pressure less than 130/85    Leg pain    SOB (shortness of breath)    Dyslipidemia    Localized edema    Encounter to establish care    S/P PTCA (percutaneous transluminal coronary angioplasty)    Coronary artery disease due to lipid rich plaque    Mixed hyperlipidemia    Chest pain       Cardiology Procedures this admission:  EchoCardiogram  Consults: Ortho    Hospital Course: Patient presented to  for scheduled right knee arthroplasty total revision. Post op patient c/o chest pain that was across her chest and into her right arm and neck. She notes associated shortness of breath and diaphoresis. She was treated with SL nitro, IVP morphine, a nitro drip briefly. Nitro paste was applied when nitro drip . EKG showed SR with NSST changes, unchanged from last ekg. She was transferred to [facility] for further treatment. Initial hs trop 6.0, with repeat  4.5, and 3.3. Echo results:  EF 60-65% , no WMA    The morning of discharge, patient was up feeling well without any complaints of chest pain or shortness of breath. The patient will follow up with [facility] 121 Cardiology -- Dr. Mari Meng in 1-2 weeks. DISPOSITION: The patient is being discharged home in stable condition on a low saturated fat, low cholesterol and low salt diet. The patient is instructed to call the office or return to the ER for immediate evaluation for any shortness of breath or chest pain not relieved by NTG. Discharge Exam:   Visit Vitals  /80 (BP 1 Location: Right arm, BP Patient Position: At rest)   Pulse 67   Temp 97.3 °F (36.3 °C)   Resp 18   Wt 76.3 kg (168 lb 3.2 oz)   SpO2 98%   BMI 28.87 kg/m²     Patient has been seen by Dr. Reta Lindquist: see his progress note for exam details. Recent Results (from the past 24 hour(s))   CULTURE, WOUND Radha Sears STAIN    Collection Time: 04/21/22 11:55 AM    Specimen: Knee ; Wound   Result Value Ref Range    Special Requests: OPENING  RIGHT KNEE  CULTURE       GRAM STAIN 0 TO 1 WBCS PER OIF     GRAM STAIN NO DEFINITE ORGANISM SEEN      Culture result:        NO GROWTH AFTER SHORT PERIOD OF INCUBATION. FURTHER RESULTS TO FOLLOW AFTER OVERNIGHT INCUBATION. CULTURE, ANAEROBIC    Collection Time: 04/21/22 11:55 AM    Specimen: Knee    Result Value Ref Range    Special Requests: OPENING  RIGHT KNEE  CULTURE       Culture result:        NO GROWTH AFTER SHORT PERIOD OF INCUBATION. FURTHER RESULTS TO FOLLOW AFTER OVERNIGHT INCUBATION. CULTURE, WOUND Radha Sears STAIN    Collection Time: 04/21/22 12:00 PM    Specimen: Knee ; Wound   Result Value Ref Range    Special Requests: DEEP  RIGHT KNEE  CULTURE       GRAM STAIN 0 TO 1 WBCS PER OIF     GRAM STAIN NO DEFINITE ORGANISM SEEN      Culture result:        NO GROWTH AFTER SHORT PERIOD OF INCUBATION. FURTHER RESULTS TO FOLLOW AFTER OVERNIGHT INCUBATION. CULTURE, ANAEROBIC    Collection Time: 04/21/22 12:00 PM    Specimen: Knee    Result Value Ref Range    Special Requests: DEEP  RIGHT KNEE  CULTURE       Culture result:        NO GROWTH AFTER SHORT PERIOD OF INCUBATION. FURTHER RESULTS TO FOLLOW AFTER OVERNIGHT INCUBATION. CULTURE, WOUND Radha Sears STAIN    Collection Time: 04/21/22 12:04 PM    Specimen: Knee ;  Wound   Result Value Ref Range    Special Requests: CLOSING  RIGHT KNEE  CULTURE       GRAM STAIN 0 TO 1 WBCS PER OIF     GRAM STAIN NO DEFINITE ORGANISM SEEN      Culture result:        NO GROWTH AFTER SHORT PERIOD OF INCUBATION. FURTHER RESULTS TO FOLLOW AFTER OVERNIGHT INCUBATION. CULTURE, ANAEROBIC    Collection Time: 04/21/22 12:04 PM    Specimen: Knee    Result Value Ref Range    Special Requests: CLOSING  RIGHT KNEE  CULTURE       Culture result:        NO GROWTH AFTER SHORT PERIOD OF INCUBATION. FURTHER RESULTS TO FOLLOW AFTER OVERNIGHT INCUBATION.    TROPONIN-HIGH SENSITIVITY    Collection Time: 04/21/22  1:33 PM   Result Value Ref Range    Troponin-High Sensitivity 6.0 0 - 14 pg/mL   CK    Collection Time: 04/21/22  1:33 PM   Result Value Ref Range    CK 66 21 - 215 U/L   ECHO ADULT COMPLETE    Collection Time: 04/21/22  5:49 PM   Result Value Ref Range    LV EDV A2C 42 mL    LV EDV A4C 66 mL    LV ESV A2C 18 mL    LV ESV A4C 21 mL    IVSd 0.9 0.6 - 0.9 cm    LVIDd 4.5 3.9 - 5.3 cm    LVIDs 2.9 cm    LVOT Diameter 1.8 cm    LVOT Mean Gradient 3 mmHg    LVOT VTI 28.6 cm    LVOT Peak Velocity 1.2 m/s    LVOT Peak Gradient 6 mmHg    LVPWd 0.8 0.6 - 0.9 cm    LV E' Lateral Velocity 10 cm/s    LV E' Septal Velocity 9 cm/s    LV Ejection Fraction A2C 57 %    LV Ejection Fraction A4C 68 %    EF BP 62 55 - 100 %    LVOT Area 2.5 cm2    LVOT SV 73.0 ml    LA Minor Axis 5.2 cm    LA Major Bartley 6.1 cm    LA Area 2C 14.9 cm2    LA Area 4C 22.4 cm2    LA Volume BP 52 22 - 52 mL    LA Diameter 3.4 cm    AV Mean Velocity 0.9 m/s    AV Mean Gradient 4 mmHg    AV VTI 32.3 cm    AV Peak Velocity 1.4 m/s    AV Peak Gradient 8 mmHg    AV Area by VTI 2.3 cm2    AV Area by Peak Velocity 2.1 cm2    Aortic Root 3.5 cm    Ascending Aorta 3.5 cm    IVC Proxmal 2.0 cm    MV E Wave Deceleration Time 182.0 ms    MV A Velocity 1.00 m/s    MV E Velocity 1.03 m/s    MV Mean Gradient 3 mmHg    MV VTI 31.8 cm    MV Mean Velocity 0.8 m/s    MV Max Velocity 1.2 m/s    MV Peak Gradient 5 mmHg    MV Area by VTI 2.3 cm2    PV .0 ms    PV Max Velocity 1.1 m/s    PV Peak Gradient 5 mmHg    Est. RA Pressure 3 mmHg    RVIDd 3.7 cm    RV Basal Dimension 3.0 cm    TAPSE 1.9 1.7 cm    TR Max Velocity 2.04 m/s    TR Peak Gradient 17 mmHg   EKG, 12 LEAD, INITIAL    Collection Time: 04/21/22  5:52 PM   Result Value Ref Range    Ventricular Rate 76 BPM    Atrial Rate 76 BPM    P-R Interval 166 ms    QRS Duration 86 ms    Q-T Interval 424 ms    QTC Calculation (Bezet) 477 ms    Calculated P Axis 39 degrees    Calculated R Axis 0 degrees    Calculated T Axis -6 degrees    Diagnosis       Normal sinus rhythm  Nonspecific T wave abnormality  Prolonged QT  Abnormal ECG  When compared with ECG of 29-MAR-2022 12:44,  Non-specific change in ST segment in Inferior leads  Confirmed by Anisa Wright MD (), YUMIKO MANZANO ([id number]) on 4/21/2022 6:02:45 PM     GLUCOSE, POC    Collection Time: 04/21/22  6:00 PM   Result Value Ref Range    Glucose (POC) 148 (H) 65 - 100 mg/dL    Performed by [facility]    METABOLIC PANEL, BASIC    Collection Time: 04/21/22  6:16 PM   Result Value Ref Range    Sodium 142 136 - 145 mmol/L    Potassium 4.3 3.5 - 5.1 mmol/L    Chloride 108 (H) 98 - 107 mmol/L    CO2 26 21 - 32 mmol/L    Anion gap 8 7 - 16 mmol/L    Glucose 148 (H) 65 - 100 mg/dL    BUN 14 8 - 23 MG/DL    Creatinine 0.90 0.6 - 1.0 MG/DL    GFR est AA >60 >60 ml/min/1.73m2    GFR est non-AA >60 >60 ml/min/1.73m2    Calcium 9.2 8.3 - 10.4 MG/DL   CBC W/O DIFF    Collection Time: 04/21/22  6:16 PM   Result Value Ref Range    WBC 9.2 4.3 - 11.1 K/uL    RBC 4.08 4.05 - 5.2 M/uL    HGB 10.9 (L) 11.7 - 15.4 g/dL    HCT 33.3 (L) 35.8 - 46.3 %    MCV 81.6 79.6 - 97.8 FL    MCH 26.7 26.1 - 32.9 PG    MCHC 32.7 31.4 - 35.0 g/dL    RDW 14.1 11.9 - 14.6 %    PLATELET 577 305 - 730 K/uL    MPV 9.3 (L) 9.4 - 12.3 FL    ABSOLUTE NRBC 0.00 0.0 - 0.2 K/uL   TROPONIN-HIGH SENSITIVITY    Collection Time: 04/21/22  6:16 PM   Result Value Ref Range    Troponin-High Sensitivity 4.5 0 - 14 pg/mL   TROPONIN-HIGH SENSITIVITY    Collection Time: 04/21/22  7:19 PM   Result Value Ref Range    Troponin-High Sensitivity 3.3 0 - 14 pg/mL   GLUCOSE, POC    Collection Time: 04/21/22  9:30 PM   Result Value Ref Range    Glucose (POC) 177 (H) 65 - 100 mg/dL    Performed by [facility]    METABOLIC PANEL, BASIC    Collection Time: 04/22/22  5:57 AM   Result Value Ref Range    Sodium 143 136 - 145 mmol/L    Potassium 4.1 3.5 - 5.1 mmol/L    Chloride 110 (H) 98 - 107 mmol/L    CO2 29 21 - 32 mmol/L    Anion gap 4 (L) 7 - 16 mmol/L    Glucose 139 (H) 65 - 100 mg/dL    BUN 14 8 - 23 MG/DL    Creatinine 0.70 0.6 - 1.0 MG/DL    GFR est AA >60 >60 ml/min/1.73m2    GFR est non-AA >60 >60 ml/min/1.73m2    Calcium 9.1 8.3 - 10.4 MG/DL   LIPID PANEL    Collection Time: 04/22/22  5:57 AM   Result Value Ref Range    Cholesterol, total 182 <200 MG/DL    Triglyceride 90 35 - 150 MG/DL    HDL Cholesterol 52 40 - 60 MG/DL    LDL, calculated 112 (H) <100 MG/DL    VLDL, calculated 18 6.0 - 23.0 MG/DL    CHOL/HDL Ratio 3.5     CBC W/O DIFF    Collection Time: 04/22/22  5:57 AM   Result Value Ref Range    WBC 10.3 4.3 - 11.1 K/uL    RBC 3.57 (L) 4.05 - 5.2 M/uL    HGB 9.5 (L) 11.7 - 15.4 g/dL    HCT 29.5 (L) 35.8 - 46.3 %    MCV 82.6 79.6 - 97.8 FL    MCH 26.6 26.1 - 32.9 PG    MCHC 32.2 31.4 - 35.0 g/dL    RDW 14.2 11.9 - 14.6 %    PLATELET 841 042 - 816 K/uL    MPV 9.4 9.4 - 12.3 FL    ABSOLUTE NRBC 0.00 0.0 - 0.2 K/uL   GLUCOSE, POC    Collection Time: 04/22/22  8:08 AM   Result Value Ref Range    Glucose (POC) 120 (H) 65 - 100 mg/dL    Performed by [facility]          Patient Instructions:   Current Discharge Medication List      START taking these medications    Details   oxyCODONE IR (ROXICODONE) 5 mg immediate release tablet Take 1-2 Tablets by mouth every four (4) hours as needed for Pain for up to 7 days.  Max Daily Amount: 60 mg.  Qty: 60 Tablet, Refills: 0    Associated Diagnoses: S/P revision of total knee, right         CONTINUE these medications which have CHANGED    Details   aspirin delayed-release 81 mg tablet Take 1 Tablet by mouth two (2) times a day for 30 days. Qty: 60 Tablet, Refills: 0         CONTINUE these medications which have NOT CHANGED    Details   nitroglycerin (NITROSTAT) 0.4 mg SL tablet 1 Tablet by SubLINGual route every five (5) minutes as needed for Chest Pain. Up to 3 doses. Qty: 25 Each, Refills: 1      metFORMIN (GLUCOPHAGE) 500 mg tablet Take 500 mg by mouth nightly. Indications: prevention of type 2 diabetes mellitus      cyanocobalamin (Vitamin B-12) 1,000 mcg tablet Take 5,000 mcg by mouth daily. zinc 50 mg tab tablet Take 50 mg by mouth daily. COLLAGEN Take 1,000 mg by mouth daily. cholecalciferol (Vitamin D3) (1000 Units /25 mcg) tablet Take 2,000 Units by mouth daily. !! DULoxetine (CYMBALTA) 30 mg capsule Take 30 mg by mouth nightly. Indications: repeated episodes of anxiety      pantoprazole (PROTONIX) 40 mg tablet Take 1 Tab by mouth daily. Qty: 30 Tab, Refills: 6      !! DULoxetine (CYMBALTA) 60 mg capsule Take 60 mg by mouth nightly. Takes total of 90 mg  Indications: anxiousness associated with depression, repeated episodes of anxiety      trazodone HCl (DESYREL PO) Take 200 mg by mouth nightly. For insomnia and anxiety  Indications: pain       !! - Potential duplicate medications found. Please discuss with provider.             Signed:  Steve Shine NP  4/22/2022  10:44 AM

## 2022-04-22 NOTE — PROGRESS NOTES
ACUTE PHYSICAL THERAPY GOALS:  (Developed with and agreed upon by patient and/or caregiver. )  LTG:  (1.)Ms. Olivares Never will move from supine to sit and sit to supine , scoot up and down and roll side to side in bed with MODIFIED INDEPENDENCE within 7 treatment day(s). (2.)Ms. Olivares Never will transfer from bed to chair and chair to bed with MODIFIED INDEPENDENCE using the least restrictive device within 7 treatment day(s). (3.)Ms. Olivares Never will ambulate with MODIFIED INDEPENDENCE for 500 feet with the least restrictive device within 7 treatment day(s). (4.)Ms. Olivares Never will ascend and descend 3 stairs using R hand rail(s) with SUPERVISION to improve functional mobility and safety within 7 day(s). ________________________________________________________________________________________________          PHYSICAL THERAPY ASSESSMENT: Initial Assessment and AM PT Treatment Day # 1   R LE WBAT      Van Varela is a 65 y.o. female   PRIMARY DIAGNOSIS: Chest pain  Chest pain [R07.9]       Reason for Referral:    ICD-10: Treatment Diagnosis: Generalized Muscle Weakness (M62.81)  OBSERVATION: Payor: BLUE CROSS / Plan: [place] [employer] BLUE [employer]ENTIALS EVELYNE / Product Type: EVELYNE /     ASSESSMENT:     REHAB RECOMMENDATIONS:   Recommendation to date pending progress:  [address] [place] [address]  Equipment:    None     PRIOR LEVEL OF FUNCTION:  (Prior to Hospitalization) INITIAL/CURRENT LEVEL OF FUNCTION:  (Most Recently Demonstrated)   Bed Mobility:   Independent  Sit to Stand:   Independent  Transfers:   Independent  Gait/Mobility:   Independent Bed Mobility:   Standby Assistance  Sit to Stand:  [facility] [employer] Assistance  Transfers:   Supervision  Gait/Mobility:   Supervision     ASSESSMENT:  Ms. Olivares Never presents to PT with decreased AROM and strength in R LE. Ms. Olivares Never is s/p R TKA revision and was educated on R LE WBAT orders. She was able to perform bed mobility with SBA and good sitting balance.   Pt transferred with RW and SBA demonstrated good-fair+ standing balance. She was able to ambulate in ware with supervision and reciprocal gait patter today. Pt returned to room and participated in seated exercises for R LE strengthening. Ms. Mona Sykes could benefit from skilled PT as she is currently functioning below her baseline.         SUBJECTIVE:   Ms. Mona Sykes states, \"I knee something was wrong\"    SOCIAL HISTORY/LIVING ENVIRONMENT: Lives alone and independent PTA but having knee instability recently  Support Systems: Child(christiano),Other Family Member(s),Friend/Neighbor,[employer]  OBJECTIVE:     PAIN: VITAL SIGNS: LINES/DRAINS:   Pre Treatment: Pain Screen  Pain Scale 1: Numeric (0 - 10)  Pain Intensity 1: 6  Pain Onset 1: post op  Pain Location 1: Knee  Pain Orientation 1: Right  Post Treatment: 4   IV  O2 Device: None (Room air)     GROSS EVALUATION:  B LE Within Functional Limits Abnormal/ Functional Abnormal/ Non-Functional (see comments) Not Tested Comments:   AROM [] [x] [] [] R knee flexion   PROM [] [] [] []    Strength [] [x] [] [] R LE   Balance [x] [] [] []    Posture [] [] [] []    Sensation [] [] [] []    Coordination [] [] [] []    Tone [] [] [] []    Edema [] [] [] []    Activity Tolerance [x] [] [] []     [] [] [] []      COGNITION/  PERCEPTION: Intact Impaired   (see comments) Comments:   Orientation [x] []    Vision [] []    Hearing [x] []    Command Following [x] []    Safety Awareness [x] []     [] []      MOBILITY: I Mod I S SBA CGA Min Mod Max Total  NT x2 Comments:   Bed Mobility    Rolling [] [] [] [x] [] [] [] [] [] [] []    Supine to Sit [] [] [] [x] [] [] [] [] [] [] []    Scooting [] [] [] [x] [] [] [] [] [] [] []    Sit to Supine [] [] [] [] [] [] [] [] [] [] []    Transfers    Sit to Stand [] [] [] [x] [] [] [] [] [] [] []    Bed to Chair [] [] [x] [] [] [] [] [] [] [] []    Stand to Sit [] [] [x] [] [] [] [] [] [] [] []    I=Independent, Mod I=Modified Independent, S=Supervision, SBA=Standby Assistance, CGA=Contact Guard Assistance,   Min=Minimal Assistance, Mod=Moderate Assistance, Max=Maximal Assistance, Total=Total Assistance, NT=Not Tested  GAIT: I Mod I S SBA CGA Min Mod Max Total  NT x2 Comments:   Level of Assistance [] [] [x] [] [] [] [] [] [] [] []    Distance 250 ft    DME Rolling Walker    Gait Quality Decreased gait speed    Weightbearing Status WBAT, R LE     I=Independent, Mod I=Modified Independent, S=Supervision, SBA=Standby Assistance, CGA=Contact Guard Assistance,   Min=Minimal Assistance, Mod=Moderate Assistance, Max=Maximal Assistance, Total=Total Assistance, NT=Not Tested    [place] Form       How much difficulty does the patient currently have. .. Unable A Lot A Little None   1. Turning over in bed (including adjusting bedclothes, sheets and blankets)? [] 1   [] 2   [] 3   [x] 4   2. Sitting down on and standing up from a chair with arms ( e.g., wheelchair, bedside commode, etc.)   [] 1   [] 2   [] 3   [x] 4   3. Moving from lying on back to sitting on the side of the bed? [] 1   [] 2   [] 3   [x] 4   How much help from another person does the patient currently need. .. Total A Lot A Little None   4. Moving to and from a bed to a chair (including a wheelchair)? [] 1   [] 2   [] 3   [x] 4   5. Need to walk in hospital room? [] 1   [] 2   [] 3   [x] 4   6. Climbing 3-5 steps with a railing? [] 1   [] 2   [x] 3   [] 4   © 2007, Trustees of [address] [place] [address] [address] [address], under license to [employer]. All rights reserved     Score:  Initial: 23 Most Recent: X (Date: -- )    Interpretation of Tool:  Represents activities that are increasingly more difficult (i.e. Bed mobility, Transfers, Gait).     PLAN:   FREQUENCY/DURATION: PT Plan of Care: BID for duration of hospital stay or until stated goals are met, whichever comes first.    PROBLEM LIST:   (Skilled intervention is medically necessary to address:)  1. Decreased AROM/PROM  2. Decreased Balance  3. Decreased Gait Ability  4. Decreased Strength  5. Increased Pain   INTERVENTIONS PLANNED:   (Benefits and precautions of physical therapy have been discussed with the patient.)  1. Therapeutic Activity  2. Therapeutic Exercise/HEP  3. Neuromuscular Re-education  4. Gait Training  5. Manual Therapy  6. Education     TREATMENT:     EVALUATION: Low Complexity : (Untimed Charge)    TREATMENT:   (     )  Therapeutic Activity (15 Minutes): Therapeutic activity included Rolling, Supine to Sit, Scooting, Transfer Training, Ambulation on level ground, Standing balance and seated exerecises to improve functional Mobility, Strength, ROM and Activity tolerance.     TREATMENT GRID:     Date:  4/22/22 Date:   Date:     ACTIVITY/EXERCISE AM PM AM PM AM PM   APs 1 x 20 B        Seated marching 1 x 10 R        Hip ABD/ADD 1 x 10 R        Heel slides 1 x 10 R        LAQ 1 x 10 R                          B = bilateral; AA = active assistive; A = active; P = passive    AFTER TREATMENT POSITION/PRECAUTIONS:  Chair and Needs within reach    INTERDISCIPLINARY COLLABORATION:  RN/PCT and PT/PTA    TOTAL TREATMENT DURATION:  PT Patient Time In/Time Out  Time In: 1129  Time Out: 27 [facility], PT, DPT

## 2022-04-22 NOTE — OP NOTES
[address] [address]  OPERATIVE REPORT    Name:  Ervin Blanco  MR#:  [id number]  :  1960  ACCOUNT #:  [de-identified]  DATE OF SERVICE:  2022    ORTHOPEDIC OPERATIVE SUMMARY    PREOPERATIVE DIAGNOSIS:  Painful right total knee arthroplasty with mid flexion instability. POSTOPERATIVE DIAGNOSIS:  Painful right total knee arthroplasty with mid flexion instability. PROCEDURE PERFORMED:  Right total knee arthroplasty revision. SURGEON:  Rupa Austin MD    ATTENDING:  Rupa Austin MD    ASSISTANT:  Manpreet Davies. TIANNA Small. This first surgical assistant was surgically necessary for intraoperative positioning and retraction needs. ANESTHESIA:  Via spinal with a right-sided adductor nerve block for postoperative pain relief. Additionally, a pain cocktail was injected around the soft tissues posteriorly and throughout the knee at the completion of the case for postoperative pain relief. COMPLICATIONS:  None. SPECIMENS REMOVED:  Three, one from opening, one from deep and one from closing, sent for routine microbiological analysis. IMPLANTS:  Polyethylene swap, retained the patient's previous [place] and [facility]ma[facility] Journey II implant. Polyethylene placed was a right size III-IV, 13 mm thick, Journey II ultra-high molecular weight polyethylene constrained articular insert. ESTIMATED BLOOD LOSS:  Less than 50 mL. DRAINS:  None. TOURNIQUET TIME:  18 minutes. OPERATIVE FINDINGS:  Preoperatively, the knee fully extended and flexed to 125 degrees but did have significant mid flexion laxity in deep and in 90 degrees of flexion had some increased laxity compared to full extension with the polyethylene swap in place. The knee did come again to full extension and was quite stable in mid flexion and in deep flexion with a constrained articulation.     PROCEDURE IN DETAIL:  The patient was taken to the operating room where she was placed on the operating room table in the supine position. Once an adequate level of regional anesthesia had been achieved, a tourniquet was placed on the right thigh, and the right lower extremity was prepped and draped in the usual sterile fashion. Following confirmation of surgical plan, positioning, etc., via routine time-out measures, the right lower extremity was exsanguinated and the tourniquet was inflated. A standard median parapatellar approach to the right knee was made taking care to use the patient's previous incision. Patella was subluxated laterally and the knee was inspected with findings as described above with well fixed and in acceptable position implants with femoral, tibial and patellar components. There was some 10 to 15 mL of serous appearing fluid encountered but no purulence was noted. As described above, the knee fully extended, flexed to 125 degrees but in mid flexion had significant laxity with a posterior stabilized insert. At this time, with this finding and with reasonably and acceptably positioned cemented implants, the decision was made to take the polyethylene swap to stabilize this. A constrained 13 mm thick polyethylene trial was inserted and with this in place, she had quite an acceptable range of motion from full extension and 120 degrees of flexion with good stability throughout the flexion arc. With this finding, it was felt that further revision was unnecessary at this time. The true polyethylene was inserted. The wound was copiously irrigated. Some synovial hypertrophy and fibrosis were excised. The wound was copiously irrigated with the Irrisept protocol and then closed in layers with staples for the skin. A sterile dressing was applied. The tourniquet was deflated. The patient was awakened and transferred to stretcher and to the recovery room in stable condition.   Postoperatively, she will be allowed weightbearing as tolerated, antibacterial and DVT prophylaxis will be through joint care routine.       Monse Thornton MD      [id number]  D:  04/21/2022 13:14  T:  04/22/2022 1:12  JOB #:  [id number]

## 2022-04-22 NOTE — PROGRESS NOTES
Problem: Falls - Risk of  Goal: *Absence of Falls  Description: Document Constantinoanaya Kearney Fall Risk and appropriate interventions in the flowsheet.   Outcome: Resolved/Met  Note: Fall Risk Interventions:  Mobility Interventions: Patient to call before getting OOB         Medication Interventions: Teach patient to arise slowly    Elimination Interventions: Bed/chair exit alarm              Problem: Patient Education: Go to Patient Education Activity  Goal: Patient/Family Education  Outcome: Resolved/Met     Problem: Patient Education: Go to Patient Education Activity  Goal: Patient/Family Education  Outcome: Resolved/Met

## 2022-04-22 NOTE — PROGRESS NOTES
Orthopedic Joint Progress Note    2022  Admit Date: 2022  Admit Diagnosis: Chest pain [R07.9]    * No surgery found *    Subjective:     [place]ons awake and alert/ events noted/ CP imporoving    Review of Systems: Pertinent items are noted in HPI. Objective:     PT/OT:     PATIENT MOBILITY                           Vital Signs:    Blood pressure (!) 105/55, pulse 68, temperature 98.4 °F (36.9 °C), resp. rate 18, weight 168 lb 3.2 oz (76.3 kg), SpO2 94 %.   Temp (24hrs), Av.3 °F (36.8 °C), Min:97.3 °F (36.3 °C), Max:98.7 °F (37.1 °C)      Pain Control:   Pain Assessment  Pain Scale 1: Numeric (0 - 10)  Pain Intensity 1: 6  Pain Onset 1: post op  Pain Location 1: Leg (Right leg)  Pain Orientation 1: Mid,Right  Pain Description 1: Burning  Pain Intervention(s) 1: Emotional support,Rest,Therapeutic presence    Meds:  Current Facility-Administered Medications   Medication Dose Route Frequency    aspirin delayed-release tablet 81 mg  81 mg Oral DAILY    pantoprazole (PROTONIX) tablet 40 mg  40 mg Oral DAILY    traZODone (DESYREL) tablet 200 mg  200 mg Oral QHS    DULoxetine (CYMBALTA) capsule 90 mg  90 mg Oral QHS    nitroglycerin (NITROSTAT) tablet 0.4 mg  0.4 mg SubLINGual Q5MIN PRN    sodium chloride (NS) flush 5-40 mL  5-40 mL IntraVENous Q8H    morphine injection 2 mg  2 mg IntraVENous Q4H PRN    insulin lispro (HUMALOG) injection   SubCUTAneous AC&HS    0.9% sodium chloride infusion  75 mL/hr IntraVENous CONTINUOUS    nitroglycerin (NITROBID) 2 % ointment 1 Inch  1 Inch Topical Q6H        LAB:    Lab Results   Component Value Date/Time    INR 1.0 2022 12:37 PM    INR 0.9 2021 07:17 AM    INR 0.9 2017 09:15 AM     Lab Results   Component Value Date/Time    HGB 9.5 (L) 2022 05:57 AM    HGB 10.9 (L) 2022 06:16 PM    HGB 11.3 (L) 2022 12:37 PM       Incision 21 Knee Right (Active)   Number of days: 283       Incision 22 Knee Right (Active)   Dressing Status Clean;Dry; Intact 04/22/22 0035   Dressing/Treatment Adhesive bandage; Foam impregnated with Ag 04/22/22 0035   Drainage Amount Scant 04/21/22 1743   Sonia-Wound/Incision Assessment Intact; Warm 04/22/22 0035   Number of days: 1         Physical Exam:  Calves soft/ neuro intact      Assessment:      Principal Problem:    Chest pain (7/3/2017)    Active Problems:    Coronary artery disease due to lipid rich plaque (7/3/2017)      Mixed hyperlipidemia (7/3/2017)      SVT (supraventricular tachycardia) ([facility] [address] (6/3/2021)      ROD (obstructive sleep apnea) (8/12/2021)      Anxiety (8/12/2021)         Plan:     Continue PT/OT/Rehab  Consult: Rehab team including PT, OT, recreational therapy, and    Appreciate Cardiology eval and treatment - stable  Per nursing at this time  Mobilize if OK with Cardiology  Home once cleared by cardiology

## 2022-04-22 NOTE — PROGRESS NOTES
Pt inially presented to [facility] with R knee pain. Received R TKA. Post op developed CP and into her R arm and neck; subsequently transferred DT for continuation of care. Pt lives alone in a mobile home. PTA, independent with her ADLs and able to drive. Pt has two adult sons that assist her when needed. Has a RW at home. Awaiting PT recommendations. While at , pt requested [facility], will confirm once therapy sees the pt. PCP confirmed. Insurance verified and able to afford her medications. [id number] arranged Ortho [facility] for pt. Discharge order is in and pt is now discharging home in stable condition. No other discharge needs were identified. Tx goals have been met. Care Management Interventions  PCP Verified by CM: Yes Kathryn Sill)  Mode of Transport at Discharge:  Other (see comment) (Family)  Transition of Care Consult (CM Consult): Discharge [address] Swanson: Yes  Discharge Durable Medical Equipment: No  Physical Therapy Consult: Yes  Occupational Therapy Consult: No  Speech Therapy Consult: No  Support Systems: Child(christiano),Other Family Member(s),Friend/Neighbor,[employer] [employer]  Confirm Follow Up Transport: Family  The Plan for Transition of Care is Related to the Following Treatment Goals : Return home and back to her baseline  The Patient and/or Patient Representative was Provided with a Choice of Provider and Agrees with the Discharge Plan?: Yes  Name of the Patient Representative Who was Provided with a Choice of Provider and Agrees with the Discharge Plan: Patient  Freedom of Choice List was Provided with Basic Dialogue that Supports the Patient's Individualized Plan of Care/Goals, Treatment Preferences and Shares the Quality Data Associated with the Providers?: Yes  [place] Resource Information Provided?: No  Discharge Location  Patient Expects to be Discharged to[de-identified] Home with home health

## 2022-04-22 NOTE — ROUTINE PROCESS
Bedside and verbal report received from [facility], [facility].

## 2022-04-22 NOTE — DISCHARGE INSTRUCTIONS
[address] [facility]   Patient Discharge Instructions    Gaye Roque / [id number] : 1960    Admitted 2022 Discharged: 2022     IF YOU HAVE ANY PROBLEMS ONCE YOU ARE AT HOME CALL THE FOLLOWING NUMBERS:   Main office number: [phone]    Take Home Medications         · It is important that you take the medication exactly as they are prescribed. · Keep your medication in the bottles provided by the pharmacist and keep a list of the medication names, dosages, and times to be taken in your wallet. · Do not take other medications without consulting your doctor. What to do at [address] your prehospital diet. If you have excessive nausea or vomitting call your doctor's office     Home Physical Therapy is arranged. Use rolling walker when walking. Patients who have had a joint replacement should not drive until you are seen for your follow up appointment by Dr. Brady Zayas. When to Call    - Call if you have a temperature greater then 101  - Unable to keep food down  - Loose control of your bladder or bowel function  - Are unable to bear any weight   - Need a pain medication refill     Patient Education        Total Knee Replacement: What to Expect at  [address] had a total knee replacement. The doctor replaced the worn ends of the bones that connect to your knee (thighbone and lower leg bone) with plastic and metal parts. When you leave the hospital, you should be able to move around with a walker or crutches. But you will need someone to help you at home until you have more energy and can move around better. You will go home with a bandage and stitches, staples, skin glue, or tape strips. Change the bandage as your doctor tells you to. If you have stitches or staples, your doctor will remove them about 2 weeks after your surgery. Glue or tape strips will fall off on their own over time.  You may still have some mild pain, and the area may be swollen for a few months after surgery. Your knee will continue to improve for up to a year. You will probably use a walker for some time after surgery. When you are ready, you can use a cane. You may be able to walk without support after a couple weeks, or when you are comfortable. You will need to do months of physical rehabilitation (rehab) after a knee replacement. Rehab will help you strengthen the muscles of the knee and help you regain movement. After you recover, your artificial knee will allow you to do normal daily activities with less pain or no pain at all. You may be able to hike, dance, or ride a bike. Talk to your doctor about whether you can do more strenuous activities. Always tell your caregivers that you have an artificial knee. How long it will take to walk on your own, return to normal activities, and go back to work depends on your health and how well your rehabilitation (rehab) program goes. The better you do with your rehab exercises, the quicker you will get your strength and movement back. This care sheet gives you a general idea about how long it will take for you to recover. But each person recovers at a different pace. Follow the steps below to get better as quickly as possible. How can you care for yourself at home? Activity    · Rest when you feel tired. You may take a nap, but don't stay in bed all day. When you sit, use a chair with arms. You can use the arms to help you stand up.     · Work with your physical therapist to find the best way to exercise. What you can do as your knee heals will depend on whether your new knee is cemented or uncemented. You may not be able to do certain things for a while if your new knee is uncemented.     · After your knee has healed enough, you can do more strenuous activities with caution. ? You can golf, but you may want to use a golf cart for some time. And don't wear shoes with spikes.   ? You can bike on a flat road or on a stationary bike. Talk to your doctor before biking uphill. ? Your doctor may suggest that you stay away from activities that put stress on your knee. These include tennis, badminton, contact sports like football, jumping (such as in basketball), jogging, and running. ? Avoid activities where you might fall.     · Do not sit for more than 1 hour at a time. Get up and walk around for a while before you sit again. If you must sit for a long time, prop up your leg with a chair or footstool. This will help you avoid swelling.     · Ask your doctor when you can drive again. It may take several weeks after knee replacement surgery before it's safe for you to drive.     · When you get into a car, sit on the edge of the seat. Then pull in your legs, and turn to face the front.     · You should be able to do many everyday activities 3 to 6 weeks after your surgery. You will probably need to take 4 to 16 weeks off from work. When you can go back to work depends on the type of work you do and how you feel.     · Ask your doctor when it is okay for you to have sex.     · For 12 weeks, do not lift anything heavier than 10 pounds and do not lift weights. Diet    · By the time you leave the hospital, you should be eating your normal diet. If your stomach is upset, try bland, low-fat foods like plain rice, broiled chicken, toast, and yogurt. Your doctor may suggest that you take iron and vitamin supplements.     · Drink plenty of fluids (unless your doctor tells you not to).   · Eat healthy foods, and watch your portion sizes. Try to stay at your ideal weight. Too much weight puts more stress on your new knee.     · You may notice that your bowel movements are not regular right after your surgery. This is common. Try to avoid constipation and straining with bowel movements. Drinking enough fluids, taking a stool softener, and eating foods that are good sources of fiber can help you avoid constipation.  If you have not had a bowel movement after a couple of days, talk to your doctor. Medicines    · Your doctor will tell you if and when you can restart your medicines. You will also get instructions about taking any new medicines.     · If you take aspirin or some other blood thinner, ask your doctor if and when to start taking it again. Make sure that you understand exactly what your doctor wants you to do.     · Your doctor may give you a blood-thinning medicine to prevent blood clots. If you take a blood thinner, be sure you get instructions about how to take your medicine safely. Blood thinners can cause serious bleeding problems. This medicine could be in pill form or as a shot (injection). If a shot is needed, your doctor will tell you how to do this.     · Be safe with medicines. Take pain medicines exactly as directed. ? If the doctor gave you a prescription medicine for pain, take it as prescribed. ? If you are not taking a prescription pain medicine, ask your doctor if you can take an over-the-counter medicine. ? Plan to take your pain medicine 30 minutes before exercises. It is easier to prevent pain before it starts than to stop it after it has started.     · If you think your pain medicine is making you sick to your stomach:  ? Take your medicine after meals (unless your doctor has told you not to). ? Ask your doctor for a different pain medicine.     · If your doctor prescribed antibiotics, take them as directed. Do not stop taking them just because you feel better. You need to take the full course of antibiotics. Incision care    · If your doctor told you how to care for your cut (incision), follow your doctor's instructions. You will have a dressing over the cut. A dressing helps the incision heal and protects it. Your doctor will tell you how to take care of this.     · If you did not get instructions, follow this general advice:  ?  If you have strips of tape on the cut the doctor made, leave the tape on for a week or until it falls off.  ? If you have stitches or staples, your doctor will tell you when to come back to have them removed. ? If you have skin glue on the cut, leave it on until it falls off. Skin glue is also called skin adhesive or liquid stitches. ? Change the bandage every day. ? Wash the area daily with warm water, and pat it dry. Don't use hydrogen peroxide or alcohol. They can slow healing. ? You may cover the area with a gauze bandage if it oozes fluid or rubs against clothing. ? You may shower 24 to 48 hours after surgery. Pat the incision dry. Don't swim or take a bath for the first 2 weeks, or until your doctor tells you it is okay. Exercise    · Your rehab program will give you a number of exercises to do to help you get back your knee's range of motion and strength. Always do them as your therapist tells you. Ice    · For pain and swelling, put ice or a cold pack on the area for 10 to 20 minutes at a time. Put a thin cloth between the ice and your skin. If your doctor recommended cold therapy using a portable machine, follow the instructions that came with the machine. Other instructions    · Wear compression stockings if your doctor told you to. These may help to prevent blood clots. Your doctor will tell you how long you need to keep wearing the compression stockings.     · Carry a medical alert card that says you have an artificial joint. You have metal pieces in your knee. These may set off some airport metal detectors. Follow-up care is a key part of your treatment and safety. Be sure to make and go to all appointments, and call your doctor if you are having problems. It's also a good idea to know your test results and keep a list of the medicines you take. When should you call for help? Call 911 anytime you think you may need emergency care.  For example, call if:    · You passed out (lost consciousness).     · You have severe trouble breathing.     · You have sudden chest pain and shortness of breath, or you cough up blood. Call your doctor now or seek immediate medical care if:    · You have signs of infection, such as:  ? Increased pain, swelling, warmth, or redness. ? Red streaks leading from the incision. ? Pus draining from the incision. ? A fever.     · You have signs of a blood clot, such as:  ? Pain in your calf, back of the knee, thigh, or groin. ? Redness and swelling in your leg or groin.     · Your incision comes open and begins to bleed, or the bleeding increases.     · You have pain that does not get better after you take pain medicine. Watch closely for changes in your health, and be sure to contact your doctor if:    · You do not have a bowel movement after taking a laxative. Where can you learn more? Go to http://www.gray.com/  Enter T054 in the search box to learn more about \"Total Knee Replacement: What to Expect at Home. \"  Current as of: July 1, 2021               Content Version: 13.2  © 2006-2022 [employer]. Care instructions adapted under license by Isabella Oliver (which disclaims liability or warranty for this information). If you have questions about a medical condition or this instruction, always ask your healthcare professional. [place] [address] any warranty or liability for your use of this information. Patient Education        Angina: Care Instructions  Your Care Instructions     You have a problem called angina. Angina happens when there is not enough blood flow to your heart muscle. Angina is a sign of coronary artery disease (CAD). CAD occurs when blood vessels that supply the heart become narrowed. Having CAD increases your risk of a heart attack. Chest pain or pressure is the most common symptom of angina. But some people have other symptoms, like:  · Pain, pressure, or a strange feeling in the back, neck, jaw, or upper belly, or in one or both shoulders or arms.   · Shortness of breath. · Nausea or vomiting. · Lightheadedness or sudden weakness. · Fast or irregular heartbeat. Women are somewhat more likely than men to have angina symptoms like shortness of breath, nausea, and back or jaw pain. Angina can be dangerous. That's why it is important to pay attention to your symptoms. Know what is typical for you, learn how to control your symptoms, and understand when you need to get treatment. A change in your usual pattern of symptoms is an emergency. It may mean that you are having a heart attack. The doctor has checked you carefully, but problems can develop later. If you notice any problems or new symptoms, get medical treatment right away. Follow-up care is a key part of your treatment and safety. Be sure to make and go to all appointments, and call your doctor if you are having problems. It's also a good idea to know your test results and keep a list of the medicines you take. How can you care for yourself at home? Medicines    · If your doctor has given you nitroglycerin for angina symptoms, keep it with you at all times. If you have symptoms, sit down and rest, and take the first dose of nitroglycerin as directed. If your symptoms get worse or are not getting better within 5 minutes, call 911 right away. Stay on the phone. The emergency  will give you further instructions.     · If your doctor advises it, take 1 low-dose aspirin a day to prevent heart attack.     · Be safe with medicines. Take your medicines exactly as prescribed. Call your doctor if you think you are having a problem with your medicine. You will get more details on the specific medicines your doctor prescribes. Lifestyle changes    · Do not smoke. If you need help quitting, talk to your doctor about stop-smoking programs and medicines. These can increase your chances of quitting for good.     · Eat a heart-healthy diet that is low in saturated fat and salt, and is high in fiber.  Talk to your doctor or a dietitian about healthy eating.     · Stay at a healthy weight. Or lose weight if you need to. Activity    · Talk to your doctor about a level of activity that is safe for you.     · If an activity causes angina symptoms, stop and rest.   When should you call for help? Call 911 anytime you think you may need emergency care. For example, call if:    · You passed out (lost consciousness).     · You have symptoms of a heart attack. These may include:  ? Chest pain or pressure, or a strange feeling in the chest.  ? Sweating. ? Shortness of breath. ? Nausea or vomiting. ? Pain, pressure, or a strange feeling in the back, neck, jaw, or upper belly or in one or both shoulders or arms. ? Lightheadedness or sudden weakness. ? A fast or irregular heartbeat. After you call 911, the  may tell you to chew 1 adult-strength or 2 to 4 low-dose aspirin. Wait for an ambulance. Do not try to drive yourself.     · You have angina symptoms that do not go away with rest or are not getting better within 5 minutes after you take a dose of nitroglycerin. Call your doctor now if:    · Your angina symptoms seem worse but still follow your typical pattern. You can predict when symptoms will happen, but they may come on sooner, feel worse, or last longer.     · You feel dizzy or lightheaded, or you feel like you may faint. Watch closely for changes in your health, and be sure to contact your doctor if you have any problems. Where can you learn more? Go to http://www.gray.com/  Enter H129 in the search box to learn more about \"Angina: Care Instructions. \"  Current as of: January 10, 2022               Content Version: 13.2  © 8251-0302 [employer]. Care instructions adapted under license by [employer] (which disclaims liability or warranty for this information).  If you have questions about a medical condition or this instruction, always ask your healthcare professional. [address] any warranty or liability for your use of this information. Information obtained by :  I understand that if any problems occur once I am at home I am to contact my physician. I understand and acknowledge receipt of the instructions indicated above.                                                                                                                                            Physician's or R.N.'s Signature                                                                  Date/Time                                                                                                                                              Patient or Representative Signature                                                          Date/Time

## 2022-04-22 NOTE — PROGRESS NOTES
Discharge instructions reviewed with pt. Prescriptions given for Oxycodone and aspirin and med info sheets provided for all new medications. Opportunity for questions provided. Pt voiced understanding of all discharge instructions.

## 2022-04-22 NOTE — PROGRESS NOTES
am  4/22/2022 9:45 AM    Admit Date: 4/21/2022    Admit Diagnosis: Chest pain [R07.9]      Subjective:    Patient : Van Varela is a 65 y.o. female with PMH of CAD (PCI to LAD in 2019, [facility] 2019 showed moderate CAD with patent LAD stent), SVT, HTN, DSL, ROD, anxiety, and chronic pain, who presented to  for scheduled right knee arthroplasty total revision. Post op patient c/o chest pain that was across her chest and into her right arm and neck. She notes associated shortness of breath and diaphoresis. She was treated with SL nitro, IVP morphine, a nitro drip briefly. Nitro paste was applied when nitro drip . EKG showed SR with NSST changes, unchanged from last ekg. She was transferred to [facility] for further treatment. Initial hs trop 6.0.  CBC, BMP and repeat hs trop pending. On arrival her pain is improved. The chest pain is worse with palpation.       Cardiac History     2016 [facility] with PCI to LAD  2017 [facility] with moderate CAD, patent LAD stent  2019 [facility] with moderated CAD, patent LAD stent  6/22/2021 Echo with EF 60%    4/22: Reports feeling well today with only occasional twinges of chest pain when she pushes in certain areas. This is reproducible on exam. She does endorse that this pain improved overnight with the administration of a nitro paste.      Objective:      Visit Vitals  BP 98/61 (BP 1 Location: Right arm, BP Patient Position: At rest)   Pulse 78   Temp 97.5 °F (36.4 °C)   Resp 16   Wt 168 lb 3.2 oz (76.3 kg)   SpO2 95%   BMI 28.87 kg/m²       ROS:  General ROS: negative for - chills, fatigue, HA  Hematological and Lymphatic ROS: negative for - blood clots or jaundice  Respiratory ROS: no cough, shortness of breath, or wheezing  Cardiovascular ROS: positive for - chest pain  Gastrointestinal ROS: no abdominal pain, change in bowel habits, or black or bloody stools  Neurological ROS: no TIA or stroke symptoms    Physical Exam:      Physical Examination: General appearance - Appearance: alert, well appearing, and in no distress and oriented to person, place, and time. Neck/lymph - supple, no significant adenopathy  Chest/CV - clear to auscultation, no wheezes, rales or rhonchi, symmetric air entry  Heart - normal rate, regular rhythm, normal S1, S2, no murmurs, rubs, clicks or gallops  Abdomen/GI - soft, nontender, nondistended, no masses or organomegaly   Musculoskeletal - no joint tenderness, deformity or swelling  Extremities - peripheral pulses normal, no pedal edema, no clubbing or cyanosis, R knee evidence of intervention with bandaging clean and dry, no signs of infection  Skin - normal coloration and turgor, no rashes, no suspicious skin lesions noted    Current Facility-Administered Medications   Medication Dose Route Frequency    aspirin delayed-release tablet 81 mg  81 mg Oral BID    oxyCODONE IR (ROXICODONE) tablet 5-10 mg  5-10 mg Oral Q4H PRN    acetaminophen (TYLENOL) tablet 650 mg  650 mg Oral QID    pantoprazole (PROTONIX) tablet 40 mg  40 mg Oral DAILY    traZODone (DESYREL) tablet 200 mg  200 mg Oral QHS    DULoxetine (CYMBALTA) capsule 90 mg  90 mg Oral QHS    nitroglycerin (NITROSTAT) tablet 0.4 mg  0.4 mg SubLINGual Q5MIN PRN    sodium chloride (NS) flush 5-40 mL  5-40 mL IntraVENous Q8H    morphine injection 2 mg  2 mg IntraVENous Q4H PRN    insulin lispro (HUMALOG) injection   SubCUTAneous AC&HS    0.9% sodium chloride infusion  75 mL/hr IntraVENous CONTINUOUS    nitroglycerin (NITROBID) 2 % ointment 1 Inch  1 Inch Topical Q6H       Data Review: data included in this note has been independently reviewed by the author   All lab results for the last 24 hours reviewed.      TELEMETRY: NSR    Assessment/Plan:     Principal Problem:    Chest pain (7/3/2017)    - Trop 3.3 on 4/21 down from peak of 6.0, if remains negative will not start heparin  - EKG was repeated and is largely unchanged from prior  - ASA 81mg daily, statin intolerance noted  -Ok to ambulate as appropriate per ortho recs      Active Problems:    Coronary artery disease due to lipid rich plaque (7/3/2017)    - Same as above      Mixed hyperlipidemia (7/3/2017)    - Intolerant to statin therapy, outpatient trials could include ezetimibe   - LDL from 4/22 is 112      SVT (supraventricular tachycardia) ([facility] (6/3/2021)    - Symptoms have not included palpitations and no evidence on EKGs      ROD (obstructive sleep apnea) (8/12/2021)          Anxiety (8/12/2021)    - Cymbalta 90mg qhs          Appears to be likely noncardiac symptoms she has 3 troponins that are low single-digit numbers with no delta change. She can follow-up in the office and resume her usual cardiac medicine.   Please call if needed    [facility]

## 2022-04-23 ENCOUNTER — HOME CARE VISIT (OUTPATIENT)
Dept: SCHEDULING | Facility: HOME HEALTH | Age: 62
End: 2022-04-23
Payer: COMMERCIAL

## 2022-04-23 PROCEDURE — G0151 HHCP-SERV OF PT,EA 15 MIN: HCPCS

## 2022-04-23 PROCEDURE — 400013 HH SOC

## 2022-04-24 LAB
BACTERIA SPEC CULT: NORMAL
GRAM STN SPEC: NORMAL
SERVICE CMNT-IMP: NORMAL

## 2022-04-25 ENCOUNTER — HOME CARE VISIT (OUTPATIENT)
Dept: SCHEDULING | Facility: HOME HEALTH | Age: 62
End: 2022-04-25
Payer: COMMERCIAL

## 2022-04-25 VITALS
DIASTOLIC BLOOD PRESSURE: 74 MMHG | TEMPERATURE: 97.9 F | HEART RATE: 71 BPM | RESPIRATION RATE: 17 BRPM | OXYGEN SATURATION: 97 % | SYSTOLIC BLOOD PRESSURE: 126 MMHG

## 2022-04-25 LAB
ATRIAL RATE: 128 BPM
CALCULATED P AXIS, ECG09: 45 DEGREES
CALCULATED R AXIS, ECG10: 0 DEGREES
CALCULATED T AXIS, ECG11: -50 DEGREES
DIAGNOSIS, 93000: NORMAL
P-R INTERVAL, ECG05: 180 MS
Q-T INTERVAL, ECG07: 254 MS
QRS DURATION, ECG06: 86 MS
QTC CALCULATION (BEZET), ECG08: 262 MS
VENTRICULAR RATE, ECG03: 64 BPM

## 2022-04-25 PROCEDURE — G0151 HHCP-SERV OF PT,EA 15 MIN: HCPCS

## 2022-04-27 ENCOUNTER — HOME CARE VISIT (OUTPATIENT)
Dept: SCHEDULING | Facility: HOME HEALTH | Age: 62
End: 2022-04-27
Payer: COMMERCIAL

## 2022-04-27 VITALS
RESPIRATION RATE: 17 BRPM | OXYGEN SATURATION: 98 % | HEART RATE: 83 BPM | TEMPERATURE: 98 F | SYSTOLIC BLOOD PRESSURE: 122 MMHG | DIASTOLIC BLOOD PRESSURE: 70 MMHG

## 2022-04-27 PROCEDURE — G0151 HHCP-SERV OF PT,EA 15 MIN: HCPCS

## 2022-04-29 ENCOUNTER — HOME CARE VISIT (OUTPATIENT)
Dept: SCHEDULING | Facility: HOME HEALTH | Age: 62
End: 2022-04-29
Payer: COMMERCIAL

## 2022-04-29 LAB
BACTERIA SPEC CULT: NORMAL
SERVICE CMNT-IMP: NORMAL

## 2022-04-29 PROCEDURE — G0151 HHCP-SERV OF PT,EA 15 MIN: HCPCS

## 2022-05-01 VITALS
TEMPERATURE: 99 F | DIASTOLIC BLOOD PRESSURE: 70 MMHG | RESPIRATION RATE: 17 BRPM | OXYGEN SATURATION: 98 % | DIASTOLIC BLOOD PRESSURE: 74 MMHG | OXYGEN SATURATION: 98 % | HEART RATE: 83 BPM | HEART RATE: 81 BPM | SYSTOLIC BLOOD PRESSURE: 122 MMHG | RESPIRATION RATE: 17 BRPM | SYSTOLIC BLOOD PRESSURE: 122 MMHG | TEMPERATURE: 99.3 F

## 2022-05-02 ENCOUNTER — HOME CARE VISIT (OUTPATIENT)
Dept: HOME HEALTH SERVICES | Facility: HOME HEALTH | Age: 62
End: 2022-05-02
Payer: COMMERCIAL

## 2022-05-02 VITALS
TEMPERATURE: 97.1 F | SYSTOLIC BLOOD PRESSURE: 110 MMHG | DIASTOLIC BLOOD PRESSURE: 68 MMHG | HEART RATE: 96 BPM | RESPIRATION RATE: 18 BRPM | OXYGEN SATURATION: 98 %

## 2022-05-02 PROCEDURE — G0157 HHC PT ASSISTANT EA 15: HCPCS

## 2022-05-03 ENCOUNTER — HOME CARE VISIT (OUTPATIENT)
Dept: HOME HEALTH SERVICES | Facility: HOME HEALTH | Age: 62
End: 2022-05-03
Payer: COMMERCIAL

## 2022-05-03 NOTE — CASE COMMUNICATION
Patient is not doing well, is having extreme pain and has a LOUD metallic, banging sound in her knee when she is moving leg side to side. She was not able to tolerate weight shifting side to side, as it was causing extreme pain on lateral side of knee superior to patella. She was unable to perform any rotation of hip as it caused increased pain in same spot on knee.   [facility] called and notifed POA's nurse navigator around 4 pm.    [place], [place]  [phone]

## 2022-05-04 ENCOUNTER — HOME CARE VISIT (OUTPATIENT)
Dept: HOME HEALTH SERVICES | Facility: HOME HEALTH | Age: 62
End: 2022-05-04
Payer: COMMERCIAL

## 2022-05-04 PROCEDURE — A4450 NON-WATERPROOF TAPE: HCPCS

## 2022-05-04 PROCEDURE — G0157 HHC PT ASSISTANT EA 15: HCPCS

## 2022-05-04 PROCEDURE — MED12515 SOLUTION,HYDROGEN PEROXIDE,3PER,1-OZ PK

## 2022-05-05 ENCOUNTER — HOME CARE VISIT (OUTPATIENT)
Dept: SCHEDULING | Facility: HOME HEALTH | Age: 62
End: 2022-05-05
Payer: COMMERCIAL

## 2022-05-05 VITALS
TEMPERATURE: 97.5 F | DIASTOLIC BLOOD PRESSURE: 70 MMHG | RESPIRATION RATE: 18 BRPM | SYSTOLIC BLOOD PRESSURE: 116 MMHG | OXYGEN SATURATION: 99 % | HEART RATE: 78 BPM

## 2022-05-05 VITALS
RESPIRATION RATE: 18 BRPM | OXYGEN SATURATION: 94 % | DIASTOLIC BLOOD PRESSURE: 72 MMHG | SYSTOLIC BLOOD PRESSURE: 110 MMHG | TEMPERATURE: 96.9 F | HEART RATE: 98 BPM

## 2022-05-05 PROCEDURE — G0157 HHC PT ASSISTANT EA 15: HCPCS

## 2022-05-09 ENCOUNTER — HOME CARE VISIT (OUTPATIENT)
Dept: SCHEDULING | Facility: HOME HEALTH | Age: 62
End: 2022-05-09
Payer: COMMERCIAL

## 2022-05-09 VITALS
HEART RATE: 83 BPM | OXYGEN SATURATION: 98 % | DIASTOLIC BLOOD PRESSURE: 68 MMHG | RESPIRATION RATE: 17 BRPM | TEMPERATURE: 98 F | SYSTOLIC BLOOD PRESSURE: 110 MMHG

## 2022-05-09 PROCEDURE — G0151 HHCP-SERV OF PT,EA 15 MIN: HCPCS

## 2022-05-23 ENCOUNTER — OFFICE VISIT (OUTPATIENT)
Dept: ORTHOPEDIC SURGERY | Age: 62
End: 2022-05-23

## 2022-05-23 DIAGNOSIS — Z09 FOLLOW-UP EXAMINATION: ICD-10-CM

## 2022-05-23 DIAGNOSIS — Z96.651 HISTORY OF TOTAL KNEE ARTHROPLASTY, RIGHT: Primary | ICD-10-CM

## 2022-05-23 PROCEDURE — 99024 POSTOP FOLLOW-UP VISIT: CPT | Performed by: PHYSICIAN ASSISTANT

## 2022-05-23 NOTE — PROGRESS NOTES
Name: Paolo Quarles  YOB: 1960  Gender: female  MRN: [id number]      Chief complaint:  RIGHT KNEE PAIN    History of Present Illness:     Paolo Quarles is a 65 y.o. female   She returns today for follow regarding her right TKA revision. She is still dissatisfied with the result so far with her right knee. She still complains of clicking and popping of the right knee, as well as instability. Also notes a burning sensation of the anterior portion of the right knee. She is currently in outpatient physical therapy and is tolerating that well. Currently she is walking without any assistive device. Physical exam: Skin of the right knee reveals well-healed surgical incision. Mild swelling otherwise no significant bruising or erythema noted. Currently her range of motion is from almost full extension to about 120 degrees flexion. There is no gross anterior posterior medial lateral instability noted. As she ambulates she still has a slight limp and essentially places her right foot on the ground, and this is without the use of any assistive device. Diagnoses:  4-week status post right TKA revision for polyethylene exchange    Plan:  We discussed that overall her knee objectively MRIs appear to be doing well. For just 4 weeks out from revision surgery she is getting around reasonably well without the use of any assistive device and has great range of motion. We discussed this with her as well as reasonable expectations following not only a total knee arthroplasty surgery but also a revision type surgery. She certainly needs time to hopefully progress through her recovery and become more satisfied with her result hopefully. She was again advised that clicking popping is unfortunately a characteristic of an artificial knee. She voiced understanding of that. She continue with outpatient physical therapy until they dismiss her.   We discussed her work situation she is a supervisor at [employer]. She will will be out of work for least another 6 weeks. At the time of her return she may return to working 4-hour days for 2 weeks. If her employer does not agree with this she may call back and we can adjust the stipulations. She otherwise return to see us in about 5 months.       LISSY Chery

## 2022-05-23 NOTE — LETTER
[facility]  [facility]  [place] [address]  Phone: [phone]  Fax: [address], [address]        May 23, 2022     Patient: Sheyla Stout   YOB: 1960   Date of Visit: 5/23/2022       To Whom It May Concern: It is my medical opinion that Sheyla Stout may return to work on 07/05/2022 with the following restrictions: work half days for the first 2 weeks then resume regular duty    If you have any questions or concerns, please don't hesitate to call.     Sincerely,        LISSY Arthur

## 2022-10-03 ENCOUNTER — HOSPITAL ENCOUNTER (OUTPATIENT)
Dept: MAMMOGRAPHY | Age: 62
Discharge: HOME | End: 2022-10-06
Payer: COMMERCIAL

## 2022-10-03 DIAGNOSIS — Z12.31 OTHER SCREENING MAMMOGRAM: ICD-10-CM

## 2022-10-03 PROCEDURE — 77067 SCR MAMMO BI INCL CAD: CPT

## 2022-10-04 ENCOUNTER — OFFICE VISIT (OUTPATIENT)
Dept: CARDIOLOGY CLINIC | Age: 62
End: 2022-10-04
Payer: COMMERCIAL

## 2022-10-04 VITALS
WEIGHT: 158 LBS | HEART RATE: 86 BPM | HEIGHT: 64 IN | BODY MASS INDEX: 26.98 KG/M2 | DIASTOLIC BLOOD PRESSURE: 70 MMHG | SYSTOLIC BLOOD PRESSURE: 110 MMHG

## 2022-10-04 DIAGNOSIS — I47.1 SVT (SUPRAVENTRICULAR TACHYCARDIA) (HCC): ICD-10-CM

## 2022-10-04 DIAGNOSIS — I25.119 CORONARY ARTERY DISEASE INVOLVING NATIVE CORONARY ARTERY OF NATIVE HEART WITH ANGINA PECTORIS (HCC): ICD-10-CM

## 2022-10-04 DIAGNOSIS — G47.33 OSA (OBSTRUCTIVE SLEEP APNEA): ICD-10-CM

## 2022-10-04 DIAGNOSIS — R06.02 SOB (SHORTNESS OF BREATH): ICD-10-CM

## 2022-10-04 DIAGNOSIS — I20.8 ANGINA AT REST (HCC): Primary | ICD-10-CM

## 2022-10-04 DIAGNOSIS — I10 ESSENTIAL HYPERTENSION WITH GOAL BLOOD PRESSURE LESS THAN 130/85: ICD-10-CM

## 2022-10-04 DIAGNOSIS — R07.2 PRECORDIAL PAIN: ICD-10-CM

## 2022-10-04 DIAGNOSIS — E78.2 MIXED HYPERLIPIDEMIA: ICD-10-CM

## 2022-10-04 PROBLEM — I20.0 ACCELERATING ANGINA (HCC): Status: ACTIVE | Noted: 2022-10-04

## 2022-10-04 PROBLEM — I20.0 UNSTABLE ANGINA (HCC): Status: RESOLVED | Noted: 2019-05-19 | Resolved: 2022-10-04

## 2022-10-04 PROCEDURE — 93000 ELECTROCARDIOGRAM COMPLETE: CPT | Performed by: INTERNAL MEDICINE

## 2022-10-04 PROCEDURE — 99215 OFFICE O/P EST HI 40 MIN: CPT | Performed by: INTERNAL MEDICINE

## 2022-10-04 NOTE — PROGRESS NOTES
[address], [address]  PHONE: [phone]     10/04/22    NAME:  Macario Rodriguez  : 1960  MRN: [id number]       SUBJECTIVE:   Macario Rodriguez is a 65 y.o. female seen for a follow up visit regarding the following:     Chief Complaint   Patient presents with    Irregular Heart Beat     6 month f/u     Chest Pain     On and off, states \"pain comes from the back\", jaw and neck pain        HPI: Here for CAD. LAD stent in 2016. Cath in  and  showed moderate CAD and a patent LAD stent. 2016 - cath was performed after presenting with SVT. NST 2020: normal, EF normal.    Echo 2021: normal EF. HOUSTON:  Cannot wear CPAP. Some BARR, more SSCP, pressure. Into back. Brother  from MI/SCD. Father with SCD. Angina worsening, she is worried today. No edema, palp. Patient denies recent history of orthopnea, PND, excessive dizziness and/or syncope. Past Medical History, Past Surgical History, Family history, Social History, and Medications were all reviewed with the patient today and updated as necessary. Current Outpatient Medications   Medication Sig Dispense Refill    aspirin 81 MG EC tablet Take 81 mg by mouth daily      cyanocobalamin 1000 MCG tablet Take 5,000 mcg by mouth daily      DULoxetine (CYMBALTA) 30 MG extended release capsule Take 30 mg by mouth      DULoxetine (CYMBALTA) 60 MG extended release capsule Take 60 mg by mouth      metFORMIN (GLUCOPHAGE) 500 MG tablet Take 500 mg by mouth      nitroGLYCERIN (NITROSTAT) 0.4 MG SL tablet Place 0.4 mg under the tongue      pantoprazole (PROTONIX) 40 MG tablet Take 40 mg by mouth daily       No current facility-administered medications for this visit.         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 2021    Essential hypertension with goal blood pressure less than 130/85 2019    Leg pain 2017    Dyslipidemia 2017    Localized edema 2017    SOB (shortness of breath) 2017    Chest pain 2017    Coronary artery disease involving native coronary artery of native heart with angina pectoris ([facility] 2017    Encounter to establish care 2017    Mixed hyperlipidemia 2017    S/P PTCA (percutaneous transluminal coronary angioplasty) 2017      Past Surgical History:   Procedure Laterality Date    CARDIAC CATHETERIZATION  2016    LAD stent 2016    CARDIAC CATHETERIZATION  2017    no stents    CARDIAC CATHETERIZATION  2019    no stents     SECTION  , [facility], LAPAROSCOPIC  1994    COLONOSCOPY N/A 2019    COLONOSCOPY/ BMI 30 ROOM 316 performed by Xochitl Crawford MD at [facility] ENDOSCOPY    HYSTERECTOMY ([facility])      NEUROLOGICAL SURGERY  ,     neck surgery, herniated disk C4-C5, x2 6 months apart    ORTHOPEDIC SURGERY Left     left foot. scraped bone down for spurs    ORTHOPEDIC SURGERY Left 2019    Left second and third tarsometatarsal joint arthrodesis     TONSILLECTOMY      TOTAL KNEE ARTHROPLASTY Right 2021     Family History   Problem Relation Age of Onset    Heart Disease Father     Breast Cancer Neg Hx     Diabetes Mother     Heart Disease Mother     Kidney Disease Mother      Social History     Tobacco Use    Smoking status: Never    Smokeless tobacco: Never   Substance Use Topics    Alcohol use: No         ROS:    Constitutional:   Negative for fevers and unexplained weight loss. Eyes:   Negative for visual disturbance. ENT:   Negative for significant hearing loss and tinnitus. Respiratory:   Negative for hemoptysis. Cardiovascular:   Negative except as noted in HPI. Gastrointestinal:   Negative for melena and abdominal pain. Genitourinary:   Negative for hematuria, renal stones. Integumentary:   Negative for rash or non-healing wounds  Hematologic/Lymphatic:   Negative for excessive bleeding hx or clotting disorder. Musculoskeletal:  Negative for active, unexplained/severe joint pain. Neurological:   Negative for stroke. Behavioral/Psych:   Negative for suicidal ideations. Endocrine:   Negative for uncontrolled diabetic symptoms including polyuria, polydipsia and poor wound healing. PHYSICAL EXAM:     /70   Pulse 86 Comment: per EKG  Ht 5' 4\" (1.626 m)   Wt 158 lb (71.7 kg)   BMI 27.12 kg/m²    General/Constitutional:   Alert and oriented x 3, no acute distress  HEENT:   normocephalic, atraumatic, moist mucous membranes  Neck:   No JVD or carotid bruits bilaterally  Cardiovascular:   regular rate and rhythm, no murmur/rub/gallop appreciated  Pulmonary:   clear to auscultation bilaterally, no respiratory distress  Abdomen:   soft, non-tender, non-distended  Ext:   No sig LE edema bilaterally  Skin:  warm and dry, no obvious rashes seen  Neuro:   no obvious sensory or motor deficits  Psychiatric:   normal mood and affect    EKG Today and independently reviewed by me: sinus rhythm, normal intervals and non-specific ST/T wave changes.       Lab Results   Component Value Date/Time     04/22/2022 05:57 AM    K 4.1 04/22/2022 05:57 AM     04/22/2022 05:57 AM    CO2 29 04/22/2022 05:57 AM    BUN 14 04/22/2022 05:57 AM    CREATININE 0.70 04/22/2022 05:57 AM    GLUCOSE 139 04/22/2022 05:57 AM    CALCIUM 9.1 04/22/2022 05:57 AM        Lab Results   Component Value Date    WBC 10.3 04/22/2022    HGB 9.5 (L) 04/22/2022 HCT 29.5 (L) 04/22/2022    MCV 82.6 04/22/2022     04/22/2022       No results found for: TSHFT4, TSH    Lab Results   Component Value Date    LABA1C 5.6 03/29/2022     Lab Results   Component Value Date     03/29/2022       Lab Results   Component Value Date    CHOL 182 04/22/2022     Lab Results   Component Value Date    TRIG 90 04/22/2022     Lab Results   Component Value Date    HDL 52 04/22/2022     Lab Results   Component Value Date    LDLCALC 112 (H) 04/22/2022     Lab Results   Component Value Date    LABVLDL 18 04/22/2022     Lab Results   Component Value Date    CHOLHDLRATIO 3.5 04/22/2022           I have Independently reviewed prior care notes, any ER records available, cardiac testing, labs and results with the patient and before seeing the patient today. Also independently reviewed outside records when available. ASSESSMENT:    Lupillo Ramos was seen today for irregular heart beat and chest pain. Diagnoses and all orders for this visit:    Angina at rest [facility])  -     EKG 12 lead  -     Case Request Cardiac Cath Lab  -     CBC; Future  -     Comprehensive Metabolic Panel; Future    Coronary artery disease involving native coronary artery of native heart with angina pectoris [facility])  -     Case Request Cardiac Cath Lab  -     CBC; Future  -     Comprehensive Metabolic Panel; Future    Essential hypertension with goal blood pressure less than 130/85    SVT (supraventricular tachycardia) (HCC)    HOUSTON (obstructive sleep apnea)    Precordial pain    Mixed hyperlipidemia    SOB (shortness of breath)        PLAN:      1. CAD:  On ASA, cannot take statins. More angina, worsening. Discussed options and agreed to proceed with [facility]. Plan for Left Heart Catheterization and possible Percutaneous Coronary Intervention (PCI) at [facility] due to Worsening angina within the last 2 mos as described in HPI.   Discussed risk of cardiac catheterization and potential PCI with the patient in detail. These risks include, but are not limited to, bleeding, stroke, heart attack, cardiac arrhythmias, allergic reactions, atheroemboli, acute kidney injury and cardiac arrest/death. Local complications at the site of catheter insertion were also reviewed and discussed. The patient voiced complete understanding about these risks. The patient agrees to proceed with the aforementioned associated risks. Check labs beforehand. 2. HTN: follow. 3 HPL:  LDL high. Cannot take statins, getting on repatha. Educated on such. Patient has been instructed and agrees to call our office with any issues or other concerns related to their cardiac condition(s) and/or complaint(s).         Return for Return After Test.       OLYA BYRD DO  10/4/2022 no cough

## 2022-10-05 NOTE — PROGRESS NOTES
Called to pre-assess for [facility] , Scheduled 1100. No answer & message left. Instructed pt to arrive on or before 0900 if possible. Informed pt to take ASA 324mg in the morning and hold all other meds, as well as, nothing to eat or drink after midnight tonight.

## 2022-10-06 ENCOUNTER — HOSPITAL ENCOUNTER (OUTPATIENT)
Age: 62
Discharge: HOME | End: 2022-10-06
Attending: INTERNAL MEDICINE | Admitting: INTERNAL MEDICINE
Payer: COMMERCIAL

## 2022-10-06 VITALS
SYSTOLIC BLOOD PRESSURE: 118 MMHG | TEMPERATURE: 98 F | DIASTOLIC BLOOD PRESSURE: 71 MMHG | BODY MASS INDEX: 26.63 KG/M2 | WEIGHT: 156 LBS | HEART RATE: 65 BPM | HEIGHT: 64 IN | OXYGEN SATURATION: 99 %

## 2022-10-06 DIAGNOSIS — I20.0 ACCELERATING ANGINA (HCC): ICD-10-CM

## 2022-10-06 PROBLEM — I25.10 CAD IN NATIVE ARTERY: Status: ACTIVE | Noted: 2022-10-06

## 2022-10-06 LAB
ANION GAP SERPL CALC-SCNC: 3 MMOL/L (ref 4–13)
BUN SERPL-MCNC: 16 MG/DL (ref 8–23)
CALCIUM SERPL-MCNC: 9.7 MG/DL (ref 8.3–10.4)
CHLORIDE SERPL-SCNC: 108 MMOL/L (ref 101–110)
CO2 SERPL-SCNC: 30 MMOL/L (ref 21–32)
CREAT SERPL-MCNC: 0.8 MG/DL (ref 0.6–1)
ECHO BSA: 1.79 M2
ERYTHROCYTE [DISTWIDTH] IN BLOOD BY AUTOMATED COUNT: 15.9 % (ref 11.9–14.6)
GLUCOSE SERPL-MCNC: 129 MG/DL (ref 65–100)
HCT VFR BLD AUTO: 38 % (ref 35.8–46.3)
HGB BLD-MCNC: 12.1 G/DL (ref 11.7–15.4)
MAGNESIUM SERPL-MCNC: 2.1 MG/DL (ref 1.8–2.4)
MCH RBC QN AUTO: 25.9 PG (ref 26.1–32.9)
MCHC RBC AUTO-ENTMCNC: 31.8 G/DL (ref 31.4–35)
MCV RBC AUTO: 81.2 FL (ref 79.6–97.8)
NRBC # BLD: 0 K/UL (ref 0–0.2)
PLATELET # BLD AUTO: 224 K/UL (ref 150–450)
PMV BLD AUTO: 9.2 FL (ref 9.4–12.3)
POTASSIUM SERPL-SCNC: 3.9 MMOL/L (ref 3.5–5.1)
RBC # BLD AUTO: 4.68 M/UL (ref 4.05–5.2)
SODIUM SERPL-SCNC: 141 MMOL/L (ref 136–145)
WBC # BLD AUTO: 5.1 K/UL (ref 4.3–11.1)

## 2022-10-06 PROCEDURE — 93571 IV DOP VEL&/PRESS C FLO 1ST: CPT | Performed by: INTERNAL MEDICINE

## 2022-10-06 PROCEDURE — 6360000004 HC RX CONTRAST MEDICATION: Performed by: INTERNAL MEDICINE

## 2022-10-06 PROCEDURE — 93458 L HRT ARTERY/VENTRICLE ANGIO: CPT | Performed by: INTERNAL MEDICINE

## 2022-10-06 PROCEDURE — 99152 MOD SED SAME PHYS/QHP 5/>YRS: CPT | Performed by: INTERNAL MEDICINE

## 2022-10-06 PROCEDURE — 85347 COAGULATION TIME ACTIVATED: CPT

## 2022-10-06 PROCEDURE — 80048 BASIC METABOLIC PNL TOTAL CA: CPT

## 2022-10-06 PROCEDURE — 83735 ASSAY OF MAGNESIUM: CPT

## 2022-10-06 PROCEDURE — 6360000002 HC RX W HCPCS: Performed by: INTERNAL MEDICINE

## 2022-10-06 PROCEDURE — C1887 CATHETER, GUIDING: HCPCS | Performed by: INTERNAL MEDICINE

## 2022-10-06 PROCEDURE — 2500000003 HC RX 250 WO HCPCS: Performed by: INTERNAL MEDICINE

## 2022-10-06 PROCEDURE — 85027 COMPLETE CBC AUTOMATED: CPT

## 2022-10-06 PROCEDURE — 1100000000 HC RM PRIVATE

## 2022-10-06 PROCEDURE — C1769 GUIDE WIRE: HCPCS | Performed by: INTERNAL MEDICINE

## 2022-10-06 PROCEDURE — C1894 INTRO/SHEATH, NON-LASER: HCPCS | Performed by: INTERNAL MEDICINE

## 2022-10-06 PROCEDURE — 2709999900 HC NON-CHARGEABLE SUPPLY: Performed by: INTERNAL MEDICINE

## 2022-10-06 RX ORDER — INSULIN LISPRO 100 [IU]/ML
0-4 INJECTION, SOLUTION INTRAVENOUS; SUBCUTANEOUS
Status: DISCONTINUED | OUTPATIENT
Start: 2022-10-06 | End: 2022-10-06

## 2022-10-06 RX ORDER — HEPARIN SODIUM 200 [USP'U]/100ML
INJECTION, SOLUTION INTRAVENOUS CONTINUOUS PRN
Status: COMPLETED | OUTPATIENT
Start: 2022-10-06 | End: 2022-10-06

## 2022-10-06 RX ORDER — MIDAZOLAM HYDROCHLORIDE 1 MG/ML
INJECTION INTRAMUSCULAR; INTRAVENOUS PRN
Status: DISCONTINUED | OUTPATIENT
Start: 2022-10-06 | End: 2022-10-06 | Stop reason: HOSPADM

## 2022-10-06 RX ORDER — SODIUM CHLORIDE 9 MG/ML
INJECTION, SOLUTION INTRAVENOUS PRN
Status: DISCONTINUED | OUTPATIENT
Start: 2022-10-06 | End: 2022-10-06

## 2022-10-06 RX ORDER — INSULIN LISPRO 100 [IU]/ML
0-4 INJECTION, SOLUTION INTRAVENOUS; SUBCUTANEOUS NIGHTLY
Status: DISCONTINUED | OUTPATIENT
Start: 2022-10-06 | End: 2022-10-06

## 2022-10-06 RX ORDER — PANTOPRAZOLE SODIUM 40 MG/1
40 TABLET, DELAYED RELEASE ORAL
Status: DISCONTINUED | OUTPATIENT
Start: 2022-10-07 | End: 2022-10-06

## 2022-10-06 RX ORDER — POLYETHYLENE GLYCOL 3350 17 G/17G
17 POWDER, FOR SOLUTION ORAL DAILY PRN
Status: DISCONTINUED | OUTPATIENT
Start: 2022-10-06 | End: 2022-10-06

## 2022-10-06 RX ORDER — SODIUM CHLORIDE 0.9 % (FLUSH) 0.9 %
5-40 SYRINGE (ML) INJECTION EVERY 12 HOURS SCHEDULED
Status: DISCONTINUED | OUTPATIENT
Start: 2022-10-06 | End: 2022-10-06

## 2022-10-06 RX ORDER — HEPARIN SODIUM 10000 [USP'U]/ML
INJECTION, SOLUTION INTRAVENOUS; SUBCUTANEOUS PRN
Status: DISCONTINUED | OUTPATIENT
Start: 2022-10-06 | End: 2022-10-06 | Stop reason: HOSPADM

## 2022-10-06 RX ORDER — HEPARIN SODIUM 1000 [USP'U]/ML
4000 INJECTION, SOLUTION INTRAVENOUS; SUBCUTANEOUS PRN
Status: DISCONTINUED | OUTPATIENT
Start: 2022-10-06 | End: 2022-10-06

## 2022-10-06 RX ORDER — ONDANSETRON 4 MG/1
4 TABLET, ORALLY DISINTEGRATING ORAL EVERY 8 HOURS PRN
Status: DISCONTINUED | OUTPATIENT
Start: 2022-10-06 | End: 2022-10-06

## 2022-10-06 RX ORDER — ASPIRIN 81 MG/1
81 TABLET ORAL DAILY
Status: DISCONTINUED | OUTPATIENT
Start: 2022-10-06 | End: 2022-10-06

## 2022-10-06 RX ORDER — ASPIRIN 81 MG/1
324 TABLET, CHEWABLE ORAL ONCE
Status: DISCONTINUED | OUTPATIENT
Start: 2022-10-06 | End: 2022-10-06

## 2022-10-06 RX ORDER — LIDOCAINE HYDROCHLORIDE 10 MG/ML
INJECTION, SOLUTION INFILTRATION; PERINEURAL PRN
Status: DISCONTINUED | OUTPATIENT
Start: 2022-10-06 | End: 2022-10-06 | Stop reason: HOSPADM

## 2022-10-06 RX ORDER — ONDANSETRON 2 MG/ML
4 INJECTION INTRAMUSCULAR; INTRAVENOUS EVERY 6 HOURS PRN
Status: DISCONTINUED | OUTPATIENT
Start: 2022-10-06 | End: 2022-10-06

## 2022-10-06 RX ORDER — HEPARIN SODIUM 1000 [USP'U]/ML
4000 INJECTION, SOLUTION INTRAVENOUS; SUBCUTANEOUS ONCE
Status: DISCONTINUED | OUTPATIENT
Start: 2022-10-06 | End: 2022-10-06

## 2022-10-06 RX ORDER — SODIUM CHLORIDE 9 MG/ML
INJECTION, SOLUTION INTRAVENOUS CONTINUOUS
Status: DISCONTINUED | OUTPATIENT
Start: 2022-10-06 | End: 2022-10-06

## 2022-10-06 RX ORDER — DULOXETIN HYDROCHLORIDE 60 MG/1
60 CAPSULE, DELAYED RELEASE ORAL DAILY
Status: DISCONTINUED | OUTPATIENT
Start: 2022-10-06 | End: 2022-10-06

## 2022-10-06 RX ORDER — HEPARIN SODIUM 10000 [USP'U]/100ML
5-30 INJECTION, SOLUTION INTRAVENOUS CONTINUOUS
Status: DISCONTINUED | OUTPATIENT
Start: 2022-10-06 | End: 2022-10-06

## 2022-10-06 RX ORDER — NITROGLYCERIN 20 MG/100ML
INJECTION INTRAVENOUS PRN
Status: DISCONTINUED | OUTPATIENT
Start: 2022-10-06 | End: 2022-10-06 | Stop reason: HOSPADM

## 2022-10-06 RX ORDER — SODIUM CHLORIDE 0.9 % (FLUSH) 0.9 %
5-40 SYRINGE (ML) INJECTION PRN
Status: DISCONTINUED | OUTPATIENT
Start: 2022-10-06 | End: 2022-10-06 | Stop reason: HOSPADM

## 2022-10-06 RX ORDER — LORAZEPAM 0.5 MG/1
0.5 TABLET ORAL EVERY 4 HOURS PRN
Status: DISCONTINUED | OUTPATIENT
Start: 2022-10-06 | End: 2022-10-06

## 2022-10-06 RX ORDER — HEPARIN SODIUM 1000 [USP'U]/ML
2000 INJECTION, SOLUTION INTRAVENOUS; SUBCUTANEOUS PRN
Status: DISCONTINUED | OUTPATIENT
Start: 2022-10-06 | End: 2022-10-06

## 2022-10-06 RX ORDER — SODIUM CHLORIDE 0.9 % (FLUSH) 0.9 %
5-40 SYRINGE (ML) INJECTION EVERY 12 HOURS SCHEDULED
Status: DISCONTINUED | OUTPATIENT
Start: 2022-10-06 | End: 2022-10-06 | Stop reason: HOSPADM

## 2022-10-06 RX ORDER — SODIUM CHLORIDE 0.9 % (FLUSH) 0.9 %
5-40 SYRINGE (ML) INJECTION PRN
Status: DISCONTINUED | OUTPATIENT
Start: 2022-10-06 | End: 2022-10-06

## 2022-10-06 RX ORDER — NITROGLYCERIN 0.4 MG/1
0.4 TABLET SUBLINGUAL EVERY 5 MIN PRN
Status: DISCONTINUED | OUTPATIENT
Start: 2022-10-06 | End: 2022-10-06

## 2022-10-06 RX ORDER — ZOLPIDEM TARTRATE 5 MG/1
5 TABLET ORAL NIGHTLY PRN
Status: DISCONTINUED | OUTPATIENT
Start: 2022-10-06 | End: 2022-10-06

## 2022-10-06 RX ORDER — SODIUM CHLORIDE 9 MG/ML
INJECTION, SOLUTION INTRAVENOUS CONTINUOUS
Status: DISCONTINUED | OUTPATIENT
Start: 2022-10-06 | End: 2022-10-06 | Stop reason: HOSPADM

## 2022-10-06 NOTE — Clinical Note
Catheter exchanged over exchange length wire with GUIDEWIRE 035IN 210CM PTFE COAT FIX COR J TIP 15MM FIRM BODY.

## 2022-10-06 NOTE — PROGRESS NOTES
TRANSFER - IN REPORT:    Verbal report received from [facility], RN on Eduardo Olivas being received from [address] [place] [address] for routine post-op      Report consisted of patients Situation, Background, Assessment and Recommendations(SBAR). Information from the following report(s) Procedure Summary was reviewed with the receiving nurse. Opportunity for questions and clarification was provided. Assessment completed upon patients arrival to unit and care assumed.

## 2022-10-06 NOTE — PROGRESS NOTES
[address] [address] Henrik Marsh, [address] [facility], [address] [place] [address]  PHONE: [phone]      To Whom It May Concern:          Nisha Thomas was in our facility for a scheduled procedure 10/6/22 . May return to work after 3 days of no lifting right arm. Please consider this an excused absence. She may return to work 10/10 with no lifting restrictions. If you have any questions feel free to contact [facility].   Per Arun Bean RN

## 2022-10-06 NOTE — PROGRESS NOTES
TRANSFER - OUT REPORT:    Verbal report given to Ashley Colon, RN on Danise Scheuermann  being transferred to [address] [place] [address] for routine progression of patient care       Report consisted of patient's Situation, Background, Assessment and   Recommendations(SBAR). Information from the following report(s) Nurse Handoff Report was reviewed with the receiving nurse.     [facility] with Dr Nuvia Batista wired prox LAD - negative  No further interventions  R Radial  TR band at 12mL  Versed 2mg  Fentanyl 25mcg  Heparin 10,000 units   @ 1114

## 2022-10-06 NOTE — PROGRESS NOTES
Patient received to [address] [place] [address] [address]  Ambulatory from [facility]. Patient scheduled for [facility] today with Dr Jeramy Greenberg. Procedure reviewed & questions answered, voiced good understanding consent obtained & placed on chart. All medications and medical history reviewed. Will prep patient per orders. Patient & family updated on plan of care. The patient has a fraility score of 3-MANAGING WELL, based on patient A&Ox3, patient able to ambulate to room without difficulty.

## 2022-10-06 NOTE — DISCHARGE INSTRUCTIONS
HEART CATHETERIZATION/ANGIOGRAPHY DISCHARGE INSTRUCTIONS    Check puncture site frequently for swelling or bleeding. If there is any bleeding, apply pressure over the area with a clean towel or washcloth and call 911. Notify your doctor for any redness, swelling, drainage, or oozing from the puncture site. Notify your doctor for any fever or chills. If the extremity becomes cold, numb, or painful call [facility]  Activity should be limited for the next 48 hours. No heavy lifting, pushing, pulling  or strenuous activity for 48 hours. No heavy lifting (anything over 10 pounds) for 3 days. You may resume your usual diet. Drink more fluids than usual.  Have a responsible person drive you home and stay with you for at least 24 hours after your heart catheterization/angiography. You may remove bandage from your R wrist in 24 hours. You may shower in 24 hours. No tub baths, hot tubs, or swimming for 1 week. Do not place any lotions, creams, powders, or ointments over puncture site for 1 week. You may place a clean band-aid over the puncture site each day for 5 days. Change daily. Sedation for a Medical Procedure: Care Instructions     You were given a sedative medication during your visit. While many of the effects will have worn   off before you leave; you may continue to feel some effects for several hours. Common side effects from sedation include:  Feeling sleepy. (Your doctors and nurses will make sure you are not too sleepy to go home.)  Nausea and vomiting. This usually does not last long. Feeling tired. How can you care for yourself at home? Activity    Don't do anything for 24 hours that requires attention to detail. It takes time for the medicine effects to completely wear off. Do not make important legal decisions for 24 hours. Do not sign any legal documents for 24 hours.      Do not drink alcohol today     For your safety, you should not drive or operate heavy machinery for the remainder of the day     Rest when you feel tired. Getting enough sleep will help you recover. Diet    You can eat your normal diet, unless your doctor gives you other instructions. If your stomach is upset, try clear liquids and bland, low-fat foods like plain toast or rice. Drink plenty of fluids (unless your doctor tells you not to). Don't drink alcohol for 24 hours. Medicines    Be safe with medicines. Read and follow all instructions on the label. If the doctor gave you a prescription medicine for pain, take it as prescribed. If you are not taking a prescription pain medicine, ask your doctor if you can take an over-the-counter medicine. If you think your pain medicine is making you sick to your stomach: Take your medicine after meals (unless your doctor has told you not to). Ask your doctor for a different pain medicine. I have read the above instructions and have had the opportunity to ask questions.       Patient: ________________________   Date: _____________    Witness: _______________________   Date: _____________

## 2022-10-06 NOTE — Clinical Note
Contrast Dose Calculator:   Patient's age: 65.   Patient's sex: Female. Patient weight (kg) = 70.8. Creatinine level (mg/dL) = 0.8. Creatinine clearance (mL/min): 82.54. Contrast concentration (mg/mL) = 370. MACD = 300 mL. Max Contrast dose per Creatinine Cl calculator = 185.71 mL.

## 2022-10-12 LAB — ACT BLD: 358 SECS (ref 70–128)

## 2022-10-24 ENCOUNTER — OFFICE VISIT (OUTPATIENT)
Dept: ORTHOPEDIC SURGERY | Age: 62
End: 2022-10-24
Payer: COMMERCIAL

## 2022-10-24 DIAGNOSIS — Z09 FOLLOW-UP EXAMINATION: ICD-10-CM

## 2022-10-24 DIAGNOSIS — Z96.651 HISTORY OF TOTAL KNEE ARTHROPLASTY, RIGHT: Primary | ICD-10-CM

## 2022-10-24 PROCEDURE — 99213 OFFICE O/P EST LOW 20 MIN: CPT | Performed by: PHYSICIAN ASSISTANT

## 2022-10-24 NOTE — PROGRESS NOTES
Name: Suellen Delcid  YOB: 1960  Gender: female  MRN: [id number]    Post-Op RIGHT TKA revision: 6 months    HPI: This patient returns now six months out from surgery. The patient is happy with their return to function. They are experiencing minimal discomfort. They have weaned away from the cane. Interval medical change is noted on the patient history form which is updated today. Past Medical History: Allergies: Allergies   Allergen Reactions    Acetaminophen Itching     Pt denies    Hydrocodone Itching    Hydrocodone-Acetaminophen Rash     Rash and itch    Penicillins Rash       Medications:  Current Outpatient Medications   Medication Sig    aspirin 81 MG EC tablet Take 81 mg by mouth daily    cyanocobalamin 1000 MCG tablet Take 5,000 mcg by mouth daily    DULoxetine (CYMBALTA) 30 MG extended release capsule Take 30 mg by mouth    DULoxetine (CYMBALTA) 60 MG extended release capsule Take 60 mg by mouth    metFORMIN (GLUCOPHAGE) 500 MG tablet Take 500 mg by mouth    nitroGLYCERIN (NITROSTAT) 0.4 MG SL tablet Place 0.4 mg under the tongue    pantoprazole (PROTONIX) 40 MG tablet Take 40 mg by mouth daily     No current facility-administered medications for this visit.        Past Medical history:  Past Medical History:   Diagnosis Date    Anemia     Anxiety     Arthritis     CAD (coronary artery disease)     1 stent placed in 2016, takes ASA 81 mg; cannot take statins; [place] by [place] [facility]     Chronic fatigue     sleep medicine referral    Chronic pain     feet and back, takes ibuprofen    DDD (degenerative disc disease), lumbar     Dyslipidemia     Essential hypertension     no meds     GERD (gastroesophageal reflux disease)     History of COVID-19 01/08/2022    not hospitalized     Insomnia     Leg pain     Localized edema     ankles, feet, knees    Mixed hyperlipidemia     Nausea & vomiting     post op nausea and vomiting    [place] (obstructive sleep apnea)     mild; has

## 2023-02-11 ENCOUNTER — HOSPITAL ENCOUNTER (OUTPATIENT)
Dept: MRI IMAGING | Age: 63
End: 2023-02-11
Payer: COMMERCIAL

## 2023-02-11 DIAGNOSIS — M53.9 MULTILEVEL DEGENERATIVE DISC DISEASE: ICD-10-CM

## 2023-02-11 PROCEDURE — 72141 MRI NECK SPINE W/O DYE: CPT

## 2023-04-24 ENCOUNTER — OFFICE VISIT (OUTPATIENT)
Dept: ORTHOPEDIC SURGERY | Age: 63
End: 2023-04-24
Payer: COMMERCIAL

## 2023-04-24 DIAGNOSIS — Z09 SURGERY FOLLOW-UP: ICD-10-CM

## 2023-04-24 DIAGNOSIS — Z96.651 HISTORY OF TOTAL KNEE ARTHROPLASTY, RIGHT: Primary | ICD-10-CM

## 2023-04-24 DIAGNOSIS — Z96.651 PRESENCE OF TOTAL KNEE JOINT PROSTHESIS, RIGHT: ICD-10-CM

## 2023-04-24 PROCEDURE — 99213 OFFICE O/P EST LOW 20 MIN: CPT | Performed by: ORTHOPAEDIC SURGERY

## 2023-04-24 NOTE — PROGRESS NOTES
GERD (gastroesophageal reflux disease)     History of COVID-19 2022    not hospitalized     Insomnia     Leg pain     Localized edema     ankles, feet, knees    Mixed hyperlipidemia     Nausea & vomiting     post op nausea and vomiting    [place] (obstructive sleep apnea)     mild; has a c-pap but doesn't use     Pre-diabetes     not checking bs at home; taking metformin daily ; A1C=5.6 on 3/29/22    Psychiatric disorder     anxiety    PUD (peptic ulcer disease)     S/P PTCA (percutaneous transluminal coronary angioplasty)     LAD     Scoliosis     SOB (shortness of breath)     Spinal stenosis     SVT (supraventricular tachycardia) ([facility] [facility]     Unstable angina ([facility]         has a past surgical history that includes orthopedic surgery (Left); Tonsillectomy; Total knee arthroplasty (Right, 2021); orthopedic surgery (Left, 2019); Cholecystectomy, laparoscopic (); Hysterectomy (); Colonoscopy (N/A, 2019); Cardiac catheterization (2016); Cardiac catheterization (2017); Cardiac catheterization (2019);  section (, ); neurological surgery (, ); Cardiac procedure (N/A, 10/6/2022); and Cardiac procedure (N/A, 10/6/2022). Family History:  [unfilled]     Social History:  [unfilled]    Review of Systems:       PHYSICAL EXAM: The patient is alert, awake and cooperative to examination. Ambulates with reciprocal heel toe with good alignment in the lower extremity. There is no significant limp. ROM is currently 0 to 125. There is minimal effusion present and the incision has healed well. RADIOGRAPHS: Standing AP, flexion lateral, and sunrise views of the right demonstrates the implant to be in good position with acceptable alignment with good bone/ implant interfaces.                                                                                                                                                                     RADIOGRAPHIC IMPRESSION:

## 2023-06-05 ENCOUNTER — TELEPHONE (OUTPATIENT)
Dept: ORTHOPEDIC SURGERY | Age: 63
End: 2023-06-05

## 2023-06-05 NOTE — TELEPHONE ENCOUNTER
Pt had surgery 3 yrs ago, she says knots on top of   Foot have come back and stiches inside 2 have broken off, she said in major stabbing pain, can you  See her

## 2023-06-15 PROBLEM — T84.84XA PAINFUL ORTHOPAEDIC HARDWARE (HCC): Status: ACTIVE | Noted: 2023-06-15

## 2023-06-16 RX ORDER — TRAZODONE HYDROCHLORIDE 100 MG/1
200 TABLET ORAL NIGHTLY
COMMUNITY

## 2023-06-16 RX ORDER — ACYCLOVIR 400 MG/1
TABLET ORAL 2 TIMES DAILY PRN
COMMUNITY
Start: 2023-05-24

## 2023-06-26 ENCOUNTER — ANESTHESIA EVENT (OUTPATIENT)
Dept: SURGERY | Age: 63
End: 2023-06-26
Payer: COMMERCIAL

## 2023-06-26 ASSESSMENT — ENCOUNTER SYMPTOMS: SHORTNESS OF BREATH: 1

## 2023-06-27 ENCOUNTER — APPOINTMENT (OUTPATIENT)
Dept: GENERAL RADIOLOGY | Age: 63
End: 2023-06-27
Attending: ORTHOPAEDIC SURGERY
Payer: COMMERCIAL

## 2023-06-27 ENCOUNTER — ANESTHESIA (OUTPATIENT)
Dept: SURGERY | Age: 63
End: 2023-06-27
Payer: COMMERCIAL

## 2023-06-27 ENCOUNTER — HOSPITAL ENCOUNTER (OUTPATIENT)
Age: 63
Setting detail: OUTPATIENT SURGERY
Discharge: HOME | End: 2023-06-27
Attending: ORTHOPAEDIC SURGERY | Admitting: ORTHOPAEDIC SURGERY
Payer: COMMERCIAL

## 2023-06-27 VITALS
RESPIRATION RATE: 15 BRPM | HEIGHT: 64 IN | WEIGHT: 178 LBS | OXYGEN SATURATION: 98 % | SYSTOLIC BLOOD PRESSURE: 126 MMHG | TEMPERATURE: 97.8 F | HEART RATE: 70 BPM | BODY MASS INDEX: 30.39 KG/M2 | DIASTOLIC BLOOD PRESSURE: 79 MMHG

## 2023-06-27 DIAGNOSIS — T84.84XA PAINFUL ORTHOPAEDIC HARDWARE (HCC): ICD-10-CM

## 2023-06-27 LAB
GLUCOSE BLD STRIP.AUTO-MCNC: 121 MG/DL (ref 65–100)
HGB BLD-MCNC: 12 G/DL (ref 11.7–15.4)
SERVICE CMNT-IMP: ABNORMAL

## 2023-06-27 PROCEDURE — 82962 GLUCOSE BLOOD TEST: CPT

## 2023-06-27 PROCEDURE — 7100000000 HC PACU RECOVERY - FIRST 15 MIN: Performed by: ORTHOPAEDIC SURGERY

## 2023-06-27 PROCEDURE — 7100000010 HC PHASE II RECOVERY - FIRST 15 MIN: Performed by: ORTHOPAEDIC SURGERY

## 2023-06-27 PROCEDURE — 7100000011 HC PHASE II RECOVERY - ADDTL 15 MIN: Performed by: ORTHOPAEDIC SURGERY

## 2023-06-27 PROCEDURE — 3600000012 HC SURGERY LEVEL 2 ADDTL 15MIN: Performed by: ORTHOPAEDIC SURGERY

## 2023-06-27 PROCEDURE — 3700000001 HC ADD 15 MINUTES (ANESTHESIA): Performed by: ORTHOPAEDIC SURGERY

## 2023-06-27 PROCEDURE — 2709999900 HC NON-CHARGEABLE SUPPLY: Performed by: ORTHOPAEDIC SURGERY

## 2023-06-27 PROCEDURE — 7100000001 HC PACU RECOVERY - ADDTL 15 MIN: Performed by: ORTHOPAEDIC SURGERY

## 2023-06-27 PROCEDURE — 3600000002 HC SURGERY LEVEL 2 BASE: Performed by: ORTHOPAEDIC SURGERY

## 2023-06-27 PROCEDURE — 6360000002 HC RX W HCPCS: Performed by: NURSE ANESTHETIST, CERTIFIED REGISTERED

## 2023-06-27 PROCEDURE — 2500000003 HC RX 250 WO HCPCS: Performed by: ORTHOPAEDIC SURGERY

## 2023-06-27 PROCEDURE — 2500000003 HC RX 250 WO HCPCS: Performed by: NURSE ANESTHETIST, CERTIFIED REGISTERED

## 2023-06-27 PROCEDURE — 6360000002 HC RX W HCPCS: Performed by: NURSE PRACTITIONER

## 2023-06-27 PROCEDURE — 85018 HEMOGLOBIN: CPT

## 2023-06-27 PROCEDURE — 20680 REMOVAL OF IMPLANT DEEP: CPT | Performed by: ORTHOPAEDIC SURGERY

## 2023-06-27 PROCEDURE — 2580000003 HC RX 258: Performed by: NURSE PRACTITIONER

## 2023-06-27 PROCEDURE — 3700000000 HC ANESTHESIA ATTENDED CARE: Performed by: ORTHOPAEDIC SURGERY

## 2023-06-27 RX ORDER — SODIUM CHLORIDE, SODIUM LACTATE, POTASSIUM CHLORIDE, CALCIUM CHLORIDE 600; 310; 30; 20 MG/100ML; MG/100ML; MG/100ML; MG/100ML
INJECTION, SOLUTION INTRAVENOUS CONTINUOUS
Status: DISCONTINUED | OUTPATIENT
Start: 2023-06-27 | End: 2023-06-27 | Stop reason: HOSPADM

## 2023-06-27 RX ORDER — OXYCODONE HYDROCHLORIDE 5 MG/1
5 TABLET ORAL EVERY 6 HOURS PRN
Qty: 28 TABLET | Refills: 0 | Status: SHIPPED | OUTPATIENT
Start: 2023-06-27 | End: 2023-07-04

## 2023-06-27 RX ORDER — SODIUM CHLORIDE 0.9 % (FLUSH) 0.9 %
5-40 SYRINGE (ML) INJECTION EVERY 12 HOURS SCHEDULED
Status: DISCONTINUED | OUTPATIENT
Start: 2023-06-27 | End: 2023-06-27 | Stop reason: HOSPADM

## 2023-06-27 RX ORDER — PROPOFOL 10 MG/ML
INJECTION, EMULSION INTRAVENOUS PRN
Status: DISCONTINUED | OUTPATIENT
Start: 2023-06-27 | End: 2023-06-27 | Stop reason: SDUPTHER

## 2023-06-27 RX ORDER — HALOPERIDOL 5 MG/ML
1 INJECTION INTRAMUSCULAR
Status: DISCONTINUED | OUTPATIENT
Start: 2023-06-27 | End: 2023-06-27 | Stop reason: HOSPADM

## 2023-06-27 RX ORDER — FENTANYL CITRATE 50 UG/ML
50 INJECTION, SOLUTION INTRAMUSCULAR; INTRAVENOUS EVERY 5 MIN PRN
Status: DISCONTINUED | OUTPATIENT
Start: 2023-06-27 | End: 2023-06-27 | Stop reason: HOSPADM

## 2023-06-27 RX ORDER — OXYCODONE HYDROCHLORIDE 5 MG/1
5 TABLET ORAL
Status: DISCONTINUED | OUTPATIENT
Start: 2023-06-27 | End: 2023-06-27 | Stop reason: HOSPADM

## 2023-06-27 RX ORDER — LIDOCAINE HYDROCHLORIDE 20 MG/ML
INJECTION, SOLUTION EPIDURAL; INFILTRATION; INTRACAUDAL; PERINEURAL PRN
Status: DISCONTINUED | OUTPATIENT
Start: 2023-06-27 | End: 2023-06-27 | Stop reason: SDUPTHER

## 2023-06-27 RX ORDER — CEPHALEXIN 500 MG/1
500 CAPSULE ORAL 4 TIMES DAILY
Qty: 12 CAPSULE | Refills: 0 | Status: SHIPPED | OUTPATIENT
Start: 2023-06-27

## 2023-06-27 RX ORDER — SODIUM CHLORIDE 0.9 % (FLUSH) 0.9 %
5-40 SYRINGE (ML) INJECTION PRN
Status: DISCONTINUED | OUTPATIENT
Start: 2023-06-27 | End: 2023-06-27 | Stop reason: HOSPADM

## 2023-06-27 RX ORDER — IPRATROPIUM BROMIDE AND ALBUTEROL SULFATE 2.5; .5 MG/3ML; MG/3ML
1 SOLUTION RESPIRATORY (INHALATION)
Status: DISCONTINUED | OUTPATIENT
Start: 2023-06-27 | End: 2023-06-27 | Stop reason: HOSPADM

## 2023-06-27 RX ORDER — ONDANSETRON 2 MG/ML
4 INJECTION INTRAMUSCULAR; INTRAVENOUS
Status: DISCONTINUED | OUTPATIENT
Start: 2023-06-27 | End: 2023-06-27 | Stop reason: HOSPADM

## 2023-06-27 RX ORDER — SODIUM CHLORIDE 9 MG/ML
INJECTION, SOLUTION INTRAVENOUS PRN
Status: DISCONTINUED | OUTPATIENT
Start: 2023-06-27 | End: 2023-06-27 | Stop reason: HOSPADM

## 2023-06-27 RX ORDER — BUPIVACAINE HYDROCHLORIDE 5 MG/ML
INJECTION, SOLUTION EPIDURAL; INTRACAUDAL PRN
Status: DISCONTINUED | OUTPATIENT
Start: 2023-06-27 | End: 2023-06-27 | Stop reason: ALTCHOICE

## 2023-06-27 RX ORDER — HYDROMORPHONE HYDROCHLORIDE 2 MG/ML
0.5 INJECTION, SOLUTION INTRAMUSCULAR; INTRAVENOUS; SUBCUTANEOUS EVERY 10 MIN PRN
Status: DISCONTINUED | OUTPATIENT
Start: 2023-06-27 | End: 2023-06-27 | Stop reason: HOSPADM

## 2023-06-27 RX ADMIN — LIDOCAINE HYDROCHLORIDE 60 MG: 20 INJECTION, SOLUTION EPIDURAL; INFILTRATION; INTRACAUDAL; PERINEURAL at 11:49

## 2023-06-27 RX ADMIN — CEFAZOLIN SODIUM 2000 MG: 100 INJECTION, POWDER, LYOPHILIZED, FOR SOLUTION INTRAVENOUS at 09:58

## 2023-06-27 RX ADMIN — SODIUM CHLORIDE, POTASSIUM CHLORIDE, SODIUM LACTATE AND CALCIUM CHLORIDE: 600; 310; 30; 20 INJECTION, SOLUTION INTRAVENOUS at 09:59

## 2023-06-27 RX ADMIN — PROPOFOL 140 MCG/KG/MIN: 10 INJECTION, EMULSION INTRAVENOUS at 11:50

## 2023-06-27 RX ADMIN — PROPOFOL 40 MG: 10 INJECTION, EMULSION INTRAVENOUS at 11:49

## 2023-06-27 ASSESSMENT — PAIN SCALES - GENERAL: PAINLEVEL_OUTOF10: 8

## 2023-06-27 ASSESSMENT — PAIN DESCRIPTION - DESCRIPTORS: DESCRIPTORS: THROBBING;STABBING

## 2023-06-27 ASSESSMENT — PAIN DESCRIPTION - ORIENTATION: ORIENTATION: LEFT

## 2023-06-27 ASSESSMENT — PAIN DESCRIPTION - LOCATION: LOCATION: FOOT

## 2023-07-12 ENCOUNTER — OFFICE VISIT (OUTPATIENT)
Dept: ORTHOPEDIC SURGERY | Age: 63
End: 2023-07-12

## 2023-07-12 DIAGNOSIS — T84.84XA PAINFUL ORTHOPAEDIC HARDWARE (HCC): Primary | ICD-10-CM

## 2023-07-12 DIAGNOSIS — M79.672 LEFT FOOT PAIN: ICD-10-CM

## 2023-07-12 PROCEDURE — 99024 POSTOP FOLLOW-UP VISIT: CPT | Performed by: NURSE PRACTITIONER

## 2023-07-12 NOTE — PROGRESS NOTES
Name: Herman Jovel  YOB: 1960  Gender: female  MRN: [id number]    Procedure Performed: Left midfoot hardware removal        Date of Procedure: 06/27/2023    Subjective: Patient feels like her surgical site is doing okay. She does report having some lateral ankle pain especially over the sinus Tarsi area. Physical Examination: Incisional area appears to be healing well without signs of infection. There is a raised elevation to the skin still in this particular spot. She does have some swelling over the fourth and fifth metatarsals as well as the sinus Tarsi region. She is able to wiggle the lesser toes without difficulty or pain. She has palpable pulses and intact sensation to the foot. Imaging:   No imaging reviewed          Assessment:   Status post left midfoot hardware removal.  I discussed that postsurgical changes to the foot as well as the postop shoe in addition to the patient compensating how she walks could be the reason for the additional swelling and sinus Tarsi pain. We can follow-up with further testing if needed after reevaluation on her next visit. Plan:   3 This is stable chronic illness/condition  Treatment at this time: Time with no intervention  Studies ordered: NO XR needed @ Next Visit    Weight-bearing status: WBAT        Return to work/work restrictions: none  OTC NSAIDs - We discussed using OTC NSAID's or tylenol for inflammation and pain. I discussed reading the bottle and following the instructions. I also briefly discussed how NSAIDs can cause GI irritation and Kidney damage. I also discussed Tylenols effect on the Liver.

## 2023-08-09 ENCOUNTER — OFFICE VISIT (OUTPATIENT)
Dept: ORTHOPEDIC SURGERY | Age: 63
End: 2023-08-09

## 2023-08-09 DIAGNOSIS — T84.84XA PAINFUL ORTHOPAEDIC HARDWARE (HCC): Primary | ICD-10-CM

## 2023-08-09 DIAGNOSIS — M79.672 LEFT FOOT PAIN: ICD-10-CM

## 2023-08-09 DIAGNOSIS — M19.079 PRIMARY OSTEOARTHRITIS, UNSPECIFIED ANKLE AND FOOT: ICD-10-CM

## 2023-08-09 PROCEDURE — 99024 POSTOP FOLLOW-UP VISIT: CPT | Performed by: NURSE PRACTITIONER

## 2023-08-09 NOTE — PROGRESS NOTES
Name: Gustavo Díaz  YOB: 1960  Gender: female  MRN: [id number]    Procedure Performed: Left midfoot hardware removal           Date of Procedure: 06/27/2023      Subjective: Patient notes that she still has top of the foot pain. She reports that incision got \"infected\" since the last time we saw her, she did not take antibiotics for this. She reports still getting a lot of burning sensations as well as zinger-like sensations throughout the top of the foot and ankle. Physical Examination: Incisional area appears to be well-healed at this time. There does not appear to be any signs of infection to the foot at this time. There is mild erythema around the incision. She has palpable pulses and intact sensation to the foot. She has pain with palpation over most of the dorsal aspect of her foot.         Imaging:   No imaging reviewed          Assessment:   Status post left midfoot hardware removal.      Plan:   3 This is stable chronic illness/condition  Treatment at this time: Time with no intervention and Physical Therapy  Studies ordered: NO XR needed @ Next Visit    Weight-bearing status: WBAT        Return to work/work restrictions: none  No medications given

## 2023-09-08 ENCOUNTER — TRANSCRIBE ORDERS (OUTPATIENT)
Facility: HOSPITAL | Age: 63
End: 2023-09-08

## 2023-09-08 DIAGNOSIS — Z12.31 ENCOUNTER FOR SCREENING MAMMOGRAM FOR MALIGNANT NEOPLASM OF BREAST: Primary | ICD-10-CM

## 2023-10-05 ENCOUNTER — HOSPITAL ENCOUNTER (OUTPATIENT)
Dept: MAMMOGRAPHY | Age: 63
Discharge: HOME | End: 2023-10-05
Payer: COMMERCIAL

## 2023-10-05 VITALS — HEIGHT: 64 IN | BODY MASS INDEX: 30.39 KG/M2 | WEIGHT: 178 LBS

## 2023-10-05 DIAGNOSIS — Z12.31 ENCOUNTER FOR SCREENING MAMMOGRAM FOR MALIGNANT NEOPLASM OF BREAST: ICD-10-CM

## 2023-10-05 PROCEDURE — 77063 BREAST TOMOSYNTHESIS BI: CPT

## 2023-11-03 ENCOUNTER — TELEPHONE (OUTPATIENT)
Dept: INTERVENTIONAL RADIOLOGY/VASCULAR | Age: 63
End: 2023-11-03

## 2023-11-03 ENCOUNTER — TRANSCRIBE ORDERS (OUTPATIENT)
Dept: INTERVENTIONAL RADIOLOGY/VASCULAR | Age: 63
End: 2023-11-03

## 2023-11-03 DIAGNOSIS — M48.00 SPINAL STENOSIS, UNSPECIFIED SPINAL REGION: Primary | ICD-10-CM

## 2023-11-03 NOTE — TELEPHONE ENCOUNTER
[] Phone call to: Patient    [] Number used to reach this person: [phone]    [] Voicemail reached: N/A     [] Appointment date:  11/13/23    [] Arrival time:  0715    [] Location given: yes    [] AM Medicine with a small sip of water: Yes    [] Patient is NPO: No    [] Need for : Yes    [] Anesthetic Local    [] Blood thinners held: No    [] Education on Hold requirements prior to procedure: only blood thinner is 81 mg asa     [] Allergies: OTHER:      [] Reviewed    [] Latex Allergy: No    [] Lidocaine Allergy: No    [] CPAP at night:     [] Any recent infections: No    [] Diabetes:     [] Additional information:  No lab orders requested from office. Pt confirmed she was told they are just drawing off csf fluid due to headaches and dizziness.      [] Time taken to answer all questions    [] Call back phone number of [phone] given

## 2023-11-13 ENCOUNTER — HOSPITAL ENCOUNTER (OUTPATIENT)
Dept: INTERVENTIONAL RADIOLOGY/VASCULAR | Age: 63
Discharge: HOME | End: 2023-11-16
Payer: COMMERCIAL

## 2023-11-13 VITALS
TEMPERATURE: 97.1 F | HEART RATE: 62 BPM | RESPIRATION RATE: 16 BRPM | BODY MASS INDEX: 30.39 KG/M2 | SYSTOLIC BLOOD PRESSURE: 126 MMHG | WEIGHT: 178 LBS | HEIGHT: 64 IN | DIASTOLIC BLOOD PRESSURE: 70 MMHG | OXYGEN SATURATION: 94 %

## 2023-11-13 DIAGNOSIS — M48.00 SPINAL STENOSIS, UNSPECIFIED SPINAL REGION: ICD-10-CM

## 2023-11-13 PROCEDURE — 62328 DX LMBR SPI PNXR W/FLUOR/CT: CPT

## 2023-11-13 PROCEDURE — 2500000003 HC RX 250 WO HCPCS: Performed by: RADIOLOGY

## 2023-11-13 RX ORDER — LIDOCAINE HYDROCHLORIDE 10 MG/ML
INJECTION, SOLUTION EPIDURAL; INFILTRATION; INTRACAUDAL; PERINEURAL PRN
Status: COMPLETED | OUTPATIENT
Start: 2023-11-13 | End: 2023-11-13

## 2023-11-13 RX ADMIN — LIDOCAINE HYDROCHLORIDE 2 ML: 10 INJECTION, SOLUTION EPIDURAL; INFILTRATION; INTRACAUDAL; PERINEURAL at 08:33

## 2023-11-13 ASSESSMENT — PAIN DESCRIPTION - LOCATION: LOCATION: BACK;HEAD

## 2023-11-13 ASSESSMENT — PAIN SCALES - GENERAL: PAINLEVEL_OUTOF10: 5

## 2023-11-13 NOTE — FLOWSHEET NOTE
Recovery period without difficulty. Pt alert and oriented and denies pain. Dressing is clean, dry, and intact. Reviewed discharge instructions with patient who verbalized understanding. Pt escorted to lobby discharge area via wheelchair. Vital signs complete.

## 2023-11-13 NOTE — OR NURSING
Spoke with Anjel YUAN about this procedure. She states that the diagnosis is incorrect and should be intracranial HTN, dizziness, and HA. She wants a lumbar puncture with opening pressures only. She does not want any labs sent. Confirmed this with Clary Gomes. Dr. Schreiber Kin marilu.

## 2024-04-29 ENCOUNTER — HOSPITAL ENCOUNTER (EMERGENCY)
Age: 64
Discharge: HOME | End: 2024-04-29
Attending: EMERGENCY MEDICINE
Payer: COMMERCIAL

## 2024-04-29 ENCOUNTER — APPOINTMENT (OUTPATIENT)
Dept: GENERAL RADIOLOGY | Age: 64
End: 2024-04-29
Payer: COMMERCIAL

## 2024-04-29 VITALS
BODY MASS INDEX: 29.19 KG/M2 | HEIGHT: 64 IN | DIASTOLIC BLOOD PRESSURE: 76 MMHG | RESPIRATION RATE: 16 BRPM | WEIGHT: 171 LBS | TEMPERATURE: 97.8 F | HEART RATE: 64 BPM | SYSTOLIC BLOOD PRESSURE: 115 MMHG | OXYGEN SATURATION: 94 %

## 2024-04-29 DIAGNOSIS — R05.3 CHRONIC COUGH: ICD-10-CM

## 2024-04-29 DIAGNOSIS — R07.89 CHEST WALL PAIN: Primary | ICD-10-CM

## 2024-04-29 LAB
ALBUMIN SERPL-MCNC: 3.7 G/DL (ref 3.2–4.6)
ALBUMIN/GLOB SERPL: 1.1 (ref 1–1.9)
ALP SERPL-CCNC: 68 U/L (ref 35–104)
ALT SERPL-CCNC: 11 U/L (ref 12–65)
ANION GAP SERPL CALC-SCNC: 12 MMOL/L (ref 9–18)
AST SERPL-CCNC: 46 U/L (ref 15–37)
BASOPHILS # BLD: 0.1 K/UL (ref 0–0.2)
BASOPHILS NFR BLD: 1 % (ref 0–2)
BILIRUB SERPL-MCNC: 0.4 MG/DL (ref 0–1.2)
BUN SERPL-MCNC: 19 MG/DL (ref 8–23)
CALCIUM SERPL-MCNC: 9.3 MG/DL (ref 8.8–10.2)
CHLORIDE SERPL-SCNC: 100 MMOL/L (ref 98–107)
CO2 SERPL-SCNC: 24 MMOL/L (ref 20–28)
CREAT SERPL-MCNC: 0.88 MG/DL (ref 0.6–1.1)
DIFFERENTIAL METHOD BLD: ABNORMAL
EOSINOPHIL # BLD: 0.5 K/UL (ref 0–0.8)
EOSINOPHIL NFR BLD: 8 % (ref 0.5–7.8)
ERYTHROCYTE [DISTWIDTH] IN BLOOD BY AUTOMATED COUNT: 14.5 % (ref 11.9–14.6)
GLOBULIN SER CALC-MCNC: 3.3 G/DL (ref 2.3–3.5)
GLUCOSE SERPL-MCNC: 122 MG/DL (ref 70–99)
HCT VFR BLD AUTO: 37.3 % (ref 35.8–46.3)
HGB BLD-MCNC: 11.7 G/DL (ref 11.7–15.4)
IMM GRANULOCYTES # BLD AUTO: 0 K/UL (ref 0–0.5)
IMM GRANULOCYTES NFR BLD AUTO: 0 % (ref 0–5)
LYMPHOCYTES # BLD: 1.4 K/UL (ref 0.5–4.6)
LYMPHOCYTES NFR BLD: 25 % (ref 13–44)
MCH RBC QN AUTO: 25.2 PG (ref 26.1–32.9)
MCHC RBC AUTO-ENTMCNC: 31.4 G/DL (ref 31.4–35)
MCV RBC AUTO: 80.2 FL (ref 82–102)
MONOCYTES # BLD: 0.5 K/UL (ref 0.1–1.3)
MONOCYTES NFR BLD: 8 % (ref 4–12)
NEUTS SEG # BLD: 3.4 K/UL (ref 1.7–8.2)
NEUTS SEG NFR BLD: 58 % (ref 43–78)
NRBC # BLD: 0 K/UL (ref 0–0.2)
PLATELET # BLD AUTO: 257 K/UL (ref 150–450)
PMV BLD AUTO: 10.2 FL (ref 9.4–12.3)
POTASSIUM SERPL-SCNC: ABNORMAL MMOL/L (ref 3.5–5.1)
PROT SERPL-MCNC: 7.1 G/DL (ref 6.3–8.2)
RBC # BLD AUTO: 4.65 M/UL (ref 4.05–5.2)
SODIUM SERPL-SCNC: 136 MMOL/L (ref 136–145)
TROPONIN T SERPL HS-MCNC: 6 NG/L (ref 0–14)
TROPONIN T SERPL HS-MCNC: 6 NG/L (ref 0–14)
WBC # BLD AUTO: 5.8 K/UL (ref 4.3–11.1)

## 2024-04-29 PROCEDURE — 96374 THER/PROPH/DIAG INJ IV PUSH: CPT

## 2024-04-29 PROCEDURE — 84484 ASSAY OF TROPONIN QUANT: CPT

## 2024-04-29 PROCEDURE — 6360000002 HC RX W HCPCS: Performed by: EMERGENCY MEDICINE

## 2024-04-29 PROCEDURE — 80053 COMPREHEN METABOLIC PANEL: CPT

## 2024-04-29 PROCEDURE — 99285 EMERGENCY DEPT VISIT HI MDM: CPT

## 2024-04-29 PROCEDURE — 71046 X-RAY EXAM CHEST 2 VIEWS: CPT

## 2024-04-29 PROCEDURE — 85025 COMPLETE CBC W/AUTO DIFF WBC: CPT

## 2024-04-29 RX ORDER — KETOROLAC TROMETHAMINE 15 MG/ML
15 INJECTION, SOLUTION INTRAMUSCULAR; INTRAVENOUS
Status: COMPLETED | OUTPATIENT
Start: 2024-04-29 | End: 2024-04-29

## 2024-04-29 RX ADMIN — KETOROLAC TROMETHAMINE 15 MG: 15 INJECTION, SOLUTION INTRAMUSCULAR; INTRAVENOUS at 12:10

## 2024-04-29 ASSESSMENT — PAIN DESCRIPTION - LOCATION
LOCATION: CHEST
LOCATION: CHEST

## 2024-04-29 ASSESSMENT — LIFESTYLE VARIABLES
HOW MANY STANDARD DRINKS CONTAINING ALCOHOL DO YOU HAVE ON A TYPICAL DAY: PATIENT DOES NOT DRINK
HOW OFTEN DO YOU HAVE A DRINK CONTAINING ALCOHOL: NEVER

## 2024-04-29 ASSESSMENT — PAIN DESCRIPTION - ORIENTATION: ORIENTATION: MID

## 2024-04-29 ASSESSMENT — PAIN DESCRIPTION - DESCRIPTORS: DESCRIPTORS: PRESSURE;DISCOMFORT

## 2024-04-29 ASSESSMENT — PAIN SCALES - GENERAL
PAINLEVEL_OUTOF10: 7
PAINLEVEL_OUTOF10: 6

## 2024-04-29 ASSESSMENT — PAIN - FUNCTIONAL ASSESSMENT: PAIN_FUNCTIONAL_ASSESSMENT: 0-10

## 2024-04-29 NOTE — ED PROVIDER NOTES
Cardiomediastinal    Impression    silhouette within normal limit without pulmonary edema..  IMPRESSION: No acute  pneumonic consolidation     CBC with Auto Differential   Result Value Ref Range    WBC 5.8 4.3 - 11.1 K/uL    RBC 4.65 4.05 - 5.2 M/uL    Hemoglobin 11.7 11.7 - 15.4 g/dL    Hematocrit 37.3 35.8 - 46.3 %    MCV 80.2 (L) 82 - 102 FL    MCH 25.2 (L) 26.1 - 32.9 PG    MCHC 31.4 31.4 - 35.0 g/dL    RDW 14.5 11.9 - 14.6 %    Platelets 257 150 - 450 K/uL    MPV 10.2 9.4 - 12.3 FL    nRBC 0.00 0.0 - 0.2 K/uL    Differential Type AUTOMATED      Neutrophils % 58 43 - 78 %    Lymphocytes % 25 13 - 44 %    Monocytes % 8 4.0 - 12.0 %    Eosinophils % 8 (H) 0.5 - 7.8 %    Basophils % 1 0.0 - 2.0 %    Immature Granulocytes % 0 0.0 - 5.0 %    Neutrophils Absolute 3.4 1.7 - 8.2 K/UL    Lymphocytes Absolute 1.4 0.5 - 4.6 K/UL    Monocytes Absolute 0.5 0.1 - 1.3 K/UL    Eosinophils Absolute 0.5 0.0 - 0.8 K/UL    Basophils Absolute 0.1 0.0 - 0.2 K/UL    Immature Granulocytes Absolute 0.0 0.0 - 0.5 K/UL   Comprehensive Metabolic Panel w/ Reflex to MG   Result Value Ref Range    Sodium 136 136 - 145 mmol/L    Potassium HEMOLYZED SPECIMEN 3.5 - 5.1 mmol/L    Chloride 100 98 - 107 mmol/L    CO2 24 20 - 28 mmol/L    Anion Gap 12 9 - 18 mmol/L    Glucose 122 (H) 70 - 99 mg/dL    BUN 19 8 - 23 MG/DL    Creatinine 0.88 0.60 - 1.10 MG/DL    Est, Glom Filt Rate 74 >60 ml/min/1.73m2    Calcium 9.3 8.8 - 10.2 MG/DL    Total Bilirubin 0.4 0.0 - 1.2 MG/DL    ALT 11 (L) 12 - 65 U/L    AST 46 (H) 15 - 37 U/L    Alk Phosphatase 68 35 - 104 U/L    Total Protein 7.1 6.3 - 8.2 g/dL    Albumin 3.7 3.2 - 4.6 g/dL    Globulin 3.3 2.3 - 3.5 g/dL    Albumin/Globulin Ratio 1.1 1.0 - 1.9     Troponin   Result Value Ref Range    Troponin T 6.0 0 - 14 ng/L         XR CHEST (2 VW)   Final Result   silhouette within normal limit without pulmonary edema..  IMPRESSION: No acute   pneumonic consolidation                      No results for input(s):

## 2024-04-29 NOTE — ED TRIAGE NOTES
Pt arrives via EMS from PCP with c/o  substernal, non radiating CP for last 2 days. Hx of cardiac stents in 2015. Denies SOB. Pcp gave 262 mg of aspirin and nitro x2, with little relief noted. 12 lead NSR with R BBB. VSS. . PT is alert and oriented x 4.

## 2024-04-29 NOTE — DISCHARGE INSTRUCTIONS
Schedule outpatient follow-up appointment to see your cardiologist.  Keep working with pain management to help control your symptoms  Try doing the doorway stretching exercises like we discussed  If you start developing new or concerning symptoms return to the emergency department at that time

## 2024-06-17 ENCOUNTER — TELEPHONE (OUTPATIENT)
Age: 64
End: 2024-06-17

## 2024-06-17 NOTE — TELEPHONE ENCOUNTER
Patient having Internal levator under Oral Sedation on 07/01/24 with Dr. Sarah Yoon. Requesting risk assessment and any medication hold. Fax: [phone]

## 2024-06-18 NOTE — TELEPHONE ENCOUNTER
Prasad with [facility] called back and stated they got the voicemail of when the last time the pt was seen and  Sheron is going to call pt so she can get a appt set up.

## 2024-08-30 ENCOUNTER — TRANSCRIBE ORDERS (OUTPATIENT)
Dept: SCHEDULING | Age: 64
End: 2024-08-30

## 2024-08-30 DIAGNOSIS — R59.0 AXILLARY LYMPHADENOPATHY: Primary | ICD-10-CM

## 2024-09-16 ENCOUNTER — HOSPITAL ENCOUNTER (OUTPATIENT)
Dept: MAMMOGRAPHY | Age: 64
Discharge: HOME | End: 2024-09-19
Payer: COMMERCIAL

## 2024-09-16 VITALS — BODY MASS INDEX: 27.31 KG/M2 | WEIGHT: 160 LBS | HEIGHT: 64 IN

## 2024-09-16 DIAGNOSIS — R59.0 LYMPHADENOPATHY, AXILLARY: ICD-10-CM

## 2024-09-16 DIAGNOSIS — R59.0 AXILLARY LYMPHADENOPATHY: ICD-10-CM

## 2024-09-16 PROCEDURE — 76882 US LMTD JT/FCL EVL NVASC XTR: CPT

## 2024-09-16 PROCEDURE — G0279 TOMOSYNTHESIS, MAMMO: HCPCS

## 2025-02-25 ENCOUNTER — OFFICE VISIT (OUTPATIENT)
Age: 65
End: 2025-02-25
Payer: COMMERCIAL

## 2025-02-25 VITALS
DIASTOLIC BLOOD PRESSURE: 88 MMHG | BODY MASS INDEX: 26.98 KG/M2 | WEIGHT: 158 LBS | SYSTOLIC BLOOD PRESSURE: 124 MMHG | HEART RATE: 95 BPM | HEIGHT: 64 IN

## 2025-02-25 DIAGNOSIS — I25.119 CORONARY ARTERY DISEASE INVOLVING NATIVE CORONARY ARTERY OF NATIVE HEART WITH ANGINA PECTORIS: ICD-10-CM

## 2025-02-25 DIAGNOSIS — E78.2 MIXED HYPERLIPIDEMIA: ICD-10-CM

## 2025-02-25 DIAGNOSIS — I47.10 SVT (SUPRAVENTRICULAR TACHYCARDIA): ICD-10-CM

## 2025-02-25 DIAGNOSIS — E78.5 DYSLIPIDEMIA: ICD-10-CM

## 2025-02-25 DIAGNOSIS — R06.02 SOB (SHORTNESS OF BREATH): ICD-10-CM

## 2025-02-25 DIAGNOSIS — I25.119 ATHEROSCLEROSIS OF NATIVE CORONARY ARTERY OF NATIVE HEART WITH ANGINA PECTORIS: ICD-10-CM

## 2025-02-25 DIAGNOSIS — Z78.9 STATIN INTOLERANCE: ICD-10-CM

## 2025-02-25 DIAGNOSIS — I10 ESSENTIAL HYPERTENSION WITH GOAL BLOOD PRESSURE LESS THAN 130/85: ICD-10-CM

## 2025-02-25 DIAGNOSIS — I25.10 CAD IN NATIVE ARTERY: Primary | ICD-10-CM

## 2025-02-25 PROCEDURE — 99215 OFFICE O/P EST HI 40 MIN: CPT | Performed by: INTERNAL MEDICINE

## 2025-02-25 PROCEDURE — 3074F SYST BP LT 130 MM HG: CPT | Performed by: INTERNAL MEDICINE

## 2025-02-25 PROCEDURE — 93000 ELECTROCARDIOGRAM COMPLETE: CPT | Performed by: INTERNAL MEDICINE

## 2025-02-25 PROCEDURE — 3079F DIAST BP 80-89 MM HG: CPT | Performed by: INTERNAL MEDICINE

## 2025-02-25 RX ORDER — SODIUM CHLORIDE 9 MG/ML
INJECTION, SOLUTION INTRAVENOUS CONTINUOUS
OUTPATIENT
Start: 2025-02-25

## 2025-02-25 RX ORDER — FINERENONE 20 MG/1
1 TABLET, FILM COATED ORAL DAILY
COMMUNITY
Start: 2024-03-06

## 2025-02-25 RX ORDER — DAPAGLIFLOZIN 10 MG/1
10 TABLET, FILM COATED ORAL DAILY
COMMUNITY

## 2025-02-25 RX ORDER — SODIUM CHLORIDE 0.9 % (FLUSH) 0.9 %
5-40 SYRINGE (ML) INJECTION PRN
OUTPATIENT
Start: 2025-02-25

## 2025-02-25 RX ORDER — SODIUM CHLORIDE 9 MG/ML
INJECTION, SOLUTION INTRAVENOUS PRN
OUTPATIENT
Start: 2025-02-25

## 2025-02-25 RX ORDER — LORAZEPAM 0.5 MG/1
0.5 TABLET ORAL
OUTPATIENT
Start: 2025-02-25 | End: 2025-02-26

## 2025-02-25 RX ORDER — ASPIRIN 325 MG
325 TABLET ORAL ONCE
OUTPATIENT
Start: 2025-02-25 | End: 2025-02-25

## 2025-02-25 RX ORDER — SODIUM CHLORIDE 0.9 % (FLUSH) 0.9 %
5-40 SYRINGE (ML) INJECTION EVERY 12 HOURS SCHEDULED
OUTPATIENT
Start: 2025-02-25

## 2025-02-25 RX ORDER — SEMAGLUTIDE 1.34 MG/ML
INJECTION, SOLUTION SUBCUTANEOUS
COMMUNITY

## 2025-02-25 NOTE — PROGRESS NOTES
[address] [place] [address], [place] [address]  PHONE: [phone]     25    NAME:  Mer Carpio  : 1960  MRN: [id number]       SUBJECTIVE:   Mer Carpio is a 65 y.o. female seen for a follow up visit regarding the following:     Chief Complaint   Patient presents with    Hyperlipidemia    Follow-up    Chest Pain       HPI:   Here for CAD.    LAD stent in 2016.    Cath in  and  and  showed moderate CAD and a patent LAD stent.     2016 - cath was performed after presenting with SVT.      Echo 2021 and : normal EF.    [place]:  Cannot wear CPAP.    [facility] :    \"Heavily calcified proximal LAD with previous stenting. There is moderate in-stent restenosis in stenosis at distal stent edge.\"     She has been struggling with worsening SSCP, lack of energy, poor stamina.  BARR worsening.  Struggling doing ADLs, more angina now, worsening now.    No edema, palp.     Patient denies recent history of orthopnea, PND, excessive dizziness and/or syncope.  On ASA and repatha.         Past Medical History, Past Surgical History, Family history, Social History, and Medications were all reviewed with the patient today and updated as necessary.     Current Outpatient Medications   Medication Sig Dispense Refill    Evolocumab 140 MG/ML SOAJ Inject into the skin      Semaglutide,0.25 or 0.5MG/DOS, (OZEMPIC, 0.25 OR 0.5 MG/DOSE,) 2 MG/1.5ML SOPN Inject into the skin      Finerenone (KERENDIA) 20 MG TABS Take 1 tablet by mouth daily      dapagliflozin (FARXIGA) 10 MG tablet Take 1 tablet by mouth daily      traZODone (DESYREL) 100 MG tablet Take 2 tablets by mouth at bedtime      aspirin 81 MG EC tablet Take 1 tablet by mouth daily      DULoxetine (CYMBALTA) 30 MG extended release capsule Take 1 capsule by mouth nightly      DULoxetine (CYMBALTA) 60 MG extended release capsule Take 1 capsule by mouth nightly      pantoprazole (PROTONIX) 40 MG tablet Take 1 tablet by mouth daily

## 2025-02-26 PROBLEM — I25.119 ATHEROSCLEROSIS OF NATIVE CORONARY ARTERY OF NATIVE HEART WITH ANGINA PECTORIS: Status: ACTIVE | Noted: 2025-02-25

## 2025-03-03 DIAGNOSIS — R06.02 SOB (SHORTNESS OF BREATH): ICD-10-CM

## 2025-03-03 DIAGNOSIS — I25.119 CORONARY ARTERY DISEASE INVOLVING NATIVE CORONARY ARTERY OF NATIVE HEART WITH ANGINA PECTORIS: ICD-10-CM

## 2025-03-03 LAB
ANION GAP SERPL CALC-SCNC: 13 MMOL/L (ref 7–16)
BASOPHILS # BLD: 0.06 K/UL (ref 0–0.2)
BASOPHILS NFR BLD: 1 % (ref 0–2)
BUN SERPL-MCNC: 13 MG/DL (ref 8–23)
CALCIUM SERPL-MCNC: 9.9 MG/DL (ref 8.8–10.2)
CHLORIDE SERPL-SCNC: 103 MMOL/L (ref 98–107)
CO2 SERPL-SCNC: 24 MMOL/L (ref 20–29)
CREAT SERPL-MCNC: 0.95 MG/DL (ref 0.6–1.1)
DIFFERENTIAL METHOD BLD: ABNORMAL
EOSINOPHIL # BLD: 0.45 K/UL (ref 0–0.8)
EOSINOPHIL NFR BLD: 7.6 % (ref 0.5–7.8)
ERYTHROCYTE [DISTWIDTH] IN BLOOD BY AUTOMATED COUNT: 16.4 % (ref 11.9–14.6)
GLUCOSE SERPL-MCNC: 91 MG/DL (ref 70–99)
HCT VFR BLD AUTO: 40.8 % (ref 35.8–46.3)
HGB BLD-MCNC: 13 G/DL (ref 11.7–15.4)
IMM GRANULOCYTES # BLD AUTO: 0.02 K/UL (ref 0–0.5)
IMM GRANULOCYTES NFR BLD AUTO: 0.3 % (ref 0–5)
LYMPHOCYTES # BLD: 1.12 K/UL (ref 0.5–4.6)
LYMPHOCYTES NFR BLD: 18.9 % (ref 13–44)
MCH RBC QN AUTO: 25.3 PG (ref 26.1–32.9)
MCHC RBC AUTO-ENTMCNC: 31.9 G/DL (ref 31.4–35)
MCV RBC AUTO: 79.5 FL (ref 82–102)
MONOCYTES # BLD: 0.32 K/UL (ref 0.1–1.3)
MONOCYTES NFR BLD: 5.4 % (ref 4–12)
NEUTS SEG # BLD: 3.96 K/UL (ref 1.7–8.2)
NEUTS SEG NFR BLD: 66.8 % (ref 43–78)
NRBC # BLD: 0 K/UL (ref 0–0.2)
PLATELET # BLD AUTO: 245 K/UL (ref 150–450)
PMV BLD AUTO: 9.6 FL (ref 9.4–12.3)
POTASSIUM SERPL-SCNC: 3.9 MMOL/L (ref 3.5–5.1)
RBC # BLD AUTO: 5.13 M/UL (ref 4.05–5.2)
SODIUM SERPL-SCNC: 140 MMOL/L (ref 136–145)
WBC # BLD AUTO: 5.9 K/UL (ref 4.3–11.1)

## 2025-03-04 NOTE — PROGRESS NOTES
It has been explained that this procedure is being performed at a facility without current on-site cardiac surgical backup. This means that in the event of a serious complication requiring emergency heart surgery, the patient would be transferred to an accepting facility for further care and treatment.

## 2025-03-04 NOTE — PROGRESS NOTES
Patient pre-assessment complete for [facility] scheduled for Dr Restrepo, arrival time 0600. Patient verified using . NPO status reinforced. Patient informed to take a full dose aspirin 325mg  or 81 mg x 4 on the day of procedure. Instructed they can take all other medications excluding vitamins & supplements. Patient verbalizes understanding of all instructions & denies any questions at this time.

## 2025-03-05 ENCOUNTER — HOSPITAL ENCOUNTER (OUTPATIENT)
Age: 65
Setting detail: OUTPATIENT SURGERY
Discharge: HOME | End: 2025-03-05
Attending: INTERNAL MEDICINE | Admitting: INTERNAL MEDICINE
Payer: COMMERCIAL

## 2025-03-05 VITALS
DIASTOLIC BLOOD PRESSURE: 70 MMHG | HEART RATE: 82 BPM | BODY MASS INDEX: 27.09 KG/M2 | RESPIRATION RATE: 16 BRPM | SYSTOLIC BLOOD PRESSURE: 105 MMHG | OXYGEN SATURATION: 92 % | WEIGHT: 157.85 LBS

## 2025-03-05 DIAGNOSIS — I25.119 ATHEROSCLEROSIS OF NATIVE CORONARY ARTERY OF NATIVE HEART WITH ANGINA PECTORIS: ICD-10-CM

## 2025-03-05 LAB
EKG ATRIAL RATE: 71 BPM
EKG DIAGNOSIS: NORMAL
EKG P AXIS: 44 DEGREES
EKG P-R INTERVAL: 170 MS
EKG Q-T INTERVAL: 450 MS
EKG QRS DURATION: 134 MS
EKG QTC CALCULATION (BAZETT): 489 MS
EKG R AXIS: 39 DEGREES
EKG T AXIS: 39 DEGREES
EKG VENTRICULAR RATE: 71 BPM
GLUCOSE BLD STRIP.AUTO-MCNC: 101 MG/DL (ref 65–100)
SERVICE CMNT-IMP: ABNORMAL

## 2025-03-05 PROCEDURE — 6360000004 HC RX CONTRAST MEDICATION: Performed by: INTERNAL MEDICINE

## 2025-03-05 PROCEDURE — C1894 INTRO/SHEATH, NON-LASER: HCPCS | Performed by: INTERNAL MEDICINE

## 2025-03-05 PROCEDURE — 2709999900 HC NON-CHARGEABLE SUPPLY: Performed by: INTERNAL MEDICINE

## 2025-03-05 PROCEDURE — 93005 ELECTROCARDIOGRAM TRACING: CPT | Performed by: INTERNAL MEDICINE

## 2025-03-05 PROCEDURE — C1769 GUIDE WIRE: HCPCS | Performed by: INTERNAL MEDICINE

## 2025-03-05 PROCEDURE — 99152 MOD SED SAME PHYS/QHP 5/>YRS: CPT | Performed by: INTERNAL MEDICINE

## 2025-03-05 PROCEDURE — 2500000003 HC RX 250 WO HCPCS: Performed by: INTERNAL MEDICINE

## 2025-03-05 PROCEDURE — 82962 GLUCOSE BLOOD TEST: CPT

## 2025-03-05 PROCEDURE — 6360000002 HC RX W HCPCS: Performed by: INTERNAL MEDICINE

## 2025-03-05 PROCEDURE — 93458 L HRT ARTERY/VENTRICLE ANGIO: CPT | Performed by: INTERNAL MEDICINE

## 2025-03-05 RX ORDER — HEPARIN SODIUM 200 [USP'U]/100ML
INJECTION, SOLUTION INTRAVENOUS CONTINUOUS PRN
Status: DISCONTINUED | OUTPATIENT
Start: 2025-03-05 | End: 2025-03-05 | Stop reason: HOSPADM

## 2025-03-05 RX ORDER — ASPIRIN 81 MG/1
324 TABLET, CHEWABLE ORAL DAILY
Status: DISCONTINUED | OUTPATIENT
Start: 2025-03-05 | End: 2025-03-05 | Stop reason: HOSPADM

## 2025-03-05 RX ORDER — MIDAZOLAM HYDROCHLORIDE 1 MG/ML
INJECTION, SOLUTION INTRAMUSCULAR; INTRAVENOUS PRN
Status: DISCONTINUED | OUTPATIENT
Start: 2025-03-05 | End: 2025-03-05 | Stop reason: HOSPADM

## 2025-03-05 RX ORDER — IOPAMIDOL 755 MG/ML
INJECTION, SOLUTION INTRAVASCULAR PRN
Status: DISCONTINUED | OUTPATIENT
Start: 2025-03-05 | End: 2025-03-05 | Stop reason: HOSPADM

## 2025-03-05 RX ORDER — SODIUM CHLORIDE 9 MG/ML
INJECTION, SOLUTION INTRAVENOUS CONTINUOUS
Status: DISCONTINUED | OUTPATIENT
Start: 2025-03-05 | End: 2025-03-05 | Stop reason: HOSPADM

## 2025-03-05 RX ORDER — LISINOPRIL 2.5 MG/1
2.5 TABLET ORAL DAILY
COMMUNITY

## 2025-03-05 RX ORDER — LIDOCAINE HYDROCHLORIDE 10 MG/ML
INJECTION, SOLUTION INFILTRATION; PERINEURAL PRN
Status: DISCONTINUED | OUTPATIENT
Start: 2025-03-05 | End: 2025-03-05 | Stop reason: HOSPADM

## 2025-03-05 ASSESSMENT — PAIN DESCRIPTION - LOCATION: LOCATION: BACK

## 2025-03-05 ASSESSMENT — PAIN DESCRIPTION - PAIN TYPE: TYPE: CHRONIC PAIN

## 2025-03-05 ASSESSMENT — PAIN SCALES - GENERAL: PAINLEVEL_OUTOF10: 10

## 2025-03-05 ASSESSMENT — PAIN - FUNCTIONAL ASSESSMENT: PAIN_FUNCTIONAL_ASSESSMENT: ACTIVITIES ARE NOT PREVENTED

## 2025-03-05 ASSESSMENT — PAIN DESCRIPTION - ORIENTATION: ORIENTATION: LOWER

## 2025-03-05 ASSESSMENT — PAIN DESCRIPTION - DESCRIPTORS: DESCRIPTORS: ACHING

## 2025-03-05 NOTE — PROGRESS NOTES
TRANSFER - IN REPORT:    Verbal report received from Kendall GLORIA on Mer Carpio  being received from cath lab for routine progression of patient care      Report consisted of patient's Situation, Background, Assessment and   Recommendations(SBAR).     Information from the following report(s) Nurse Handoff Report was reviewed with the receiving nurse.    Opportunity for questions and clarification was provided.      Assessment completed upon patient's arrival to unit and care assumed.

## 2025-03-05 NOTE — PROGRESS NOTES
TRANSFER - OUT REPORT:    [facility] with Dr. Restrepo  Access: Right radial  No intervention    No bleeding or hematoma site soft  10 mL of air in radial band    MAR  3 mg versed  5,000 units of heparin    Verbal report given to Tera on Mer Carpio  being transferred to CPRU for routine progression of patient care       Report consisted of patient's Situation, Background, Assessment and Recommendations(SBAR).     Information from the following report(s) Nurse Handoff Report and MAR was reviewed with the receiving nurse.    Opportunity for questions and clarification was provided.      Patient transported with:  Registered Nurse

## 2025-03-05 NOTE — DISCHARGE INSTRUCTIONS
HEART CATHETERIZATION/ANGIOGRAPHY DISCHARGE INSTRUCTIONS    Check puncture site frequently for swelling or bleeding. If there is any bleeding, apply pressure over the area with a clean towel or washcloth and call 911. Notify your doctor for any redness, swelling, drainage, or oozing from the puncture site. Notify your doctor for any fever or chills.  If the extremity becomes cold, numb, or painful call [facility] at [phone].  Activity should be limited for the next 48 hours. No heavy lifting, pushing, pulling  or strenuous activity for 48 hours. No heavy lifting (anything over 10 pounds) for 3 days.  You may resume your usual diet. Drink more fluids than usual.  Have a responsible person drive you home and stay with you for at least 24 hours after your heart catheterization/angiography.  You may remove bandage from your right wrist in 24 hours. You may shower in 24 hours. No tub baths, hot tubs, or swimming for 1 week. Do not place any lotions, creams, powders, or ointments over puncture site for 1 week. You may place a clean band-aid over the puncture site each day for 5 days. Change daily.        Sedation for a Medical Procedure: Care Instructions     You were given a sedative medication during your visit. While many of the effects will have worn   off before you leave; you may continue to feel some effects for several hours.      Common side effects from sedation include:  Feeling sleepy. (Your doctors and nurses will make sure you are not too sleepy to go home.)  Nausea and vomiting. This usually does not last long.  Feeling tired.     How can you care for yourself at home?  Activity    Don't do anything for 24 hours that requires attention to detail. It takes time for the medicine effects to completely wear off.     Do not make important legal decisions for 24 hours.     Do not sign any legal documents for 24 hours.     Do not drink alcohol today     For your safety, you should not drive or operate

## 2025-03-05 NOTE — PROGRESS NOTES
Patient received to CPRU room # 11  Ambulatory from [facility]. Patient scheduled for [facility] today with Dr Restrepo. Procedure reviewed & questions answered, voiced good understanding consent obtained & placed on chart. All medications and medical history reviewed. Will prep patient per orders. Patient & family updated on plan of care.      The patient has a fraility score of 3-MANAGING WELL, based on ambulation without assistance.    Patient took Aspirin 324mg today at 0415 prior to arrival.

## 2025-04-02 ENCOUNTER — OFFICE VISIT (OUTPATIENT)
Age: 65
End: 2025-04-02
Payer: COMMERCIAL

## 2025-04-02 VITALS
BODY MASS INDEX: 26.46 KG/M2 | DIASTOLIC BLOOD PRESSURE: 72 MMHG | SYSTOLIC BLOOD PRESSURE: 106 MMHG | HEIGHT: 64 IN | HEART RATE: 84 BPM | WEIGHT: 155 LBS

## 2025-04-02 DIAGNOSIS — I10 ESSENTIAL HYPERTENSION WITH GOAL BLOOD PRESSURE LESS THAN 130/85: ICD-10-CM

## 2025-04-02 DIAGNOSIS — E78.5 DYSLIPIDEMIA: ICD-10-CM

## 2025-04-02 DIAGNOSIS — I25.119 CORONARY ARTERY DISEASE INVOLVING NATIVE CORONARY ARTERY OF NATIVE HEART WITH ANGINA PECTORIS: ICD-10-CM

## 2025-04-02 DIAGNOSIS — I47.10 SVT (SUPRAVENTRICULAR TACHYCARDIA): Primary | ICD-10-CM

## 2025-04-02 PROBLEM — I20.0 ACCELERATING ANGINA (HCC): Status: RESOLVED | Noted: 2022-10-04 | Resolved: 2025-04-02

## 2025-04-02 PROCEDURE — 3074F SYST BP LT 130 MM HG: CPT | Performed by: INTERNAL MEDICINE

## 2025-04-02 PROCEDURE — 99214 OFFICE O/P EST MOD 30 MIN: CPT | Performed by: INTERNAL MEDICINE

## 2025-04-02 PROCEDURE — 3078F DIAST BP <80 MM HG: CPT | Performed by: INTERNAL MEDICINE

## 2025-04-02 NOTE — PROGRESS NOTES
[address] [place] [address], [place] [address]  PHONE: [phone]     25    NAME:  Mer Carpio  : 1960  MRN: [id number]       SUBJECTIVE:   Mer Carpio is a 65 y.o. female seen for a follow up visit regarding the following:     Chief Complaint   Patient presents with    Follow-Up from Hospital    Coronary Artery Disease       HPI: Here for CAD.    LAD stent in 2016.    Cath in  and  and  and 3/2025 showed moderate CAD and a patent LAD stent.     2016 - cath was performed after presenting with SVT.       Echo 2021 and : normal EF.    [place]:  Cannot wear CPAP.     Feeling better now, seeing GI, CP better now.  Ozempic caused side effects.    CP better on miralax.    She has lost 20 pds.      Patient denies recent history of orthopnea, PND, excessive dizziness and/or syncope.  On ASA and repatha.             Past Medical History, Past Surgical History, Family history, Social History, and Medications were all reviewed with the patient today and updated as necessary.     Current Outpatient Medications   Medication Sig Dispense Refill    lisinopril (PRINIVIL;ZESTRIL) 2.5 MG tablet Take 1 tablet by mouth daily      Evolocumab 140 MG/ML SOAJ Inject into the skin      Semaglutide,0.25 or 0.5MG/DOS, (OZEMPIC, 0.25 OR 0.5 MG/DOSE,) 2 MG/1.5ML SOPN Inject into the skin      Finerenone (KERENDIA) 20 MG TABS Take 1 tablet by mouth daily      dapagliflozin (FARXIGA) 10 MG tablet Take 1 tablet by mouth daily      traZODone (DESYREL) 100 MG tablet Take 2 tablets by mouth at bedtime      aspirin 81 MG EC tablet Take 1 tablet by mouth daily      DULoxetine (CYMBALTA) 30 MG extended release capsule Take 1 capsule by mouth nightly      DULoxetine (CYMBALTA) 60 MG extended release capsule Take 1 capsule by mouth nightly      pantoprazole (PROTONIX) 40 MG tablet Take 1 tablet by mouth daily       No current facility-administered medications for this visit.        Allergies

## (undated) DEVICE — DRESSING PETRO W3XL8IN OIL EMUL N ADH GZ KNIT IMPREG CELOS

## (undated) DEVICE — CATHETER GUID AD 6FR L100CM COR PERIPH GRN NYL PTFE XB L 3

## (undated) DEVICE — (D)PREP SKN CHLRAPRP APPL 26ML -- CONVERT TO ITEM 371833

## (undated) DEVICE — GOWN,SIRUS,NONRNF,SETINSLV,XL,20/CS: Brand: MEDLINE

## (undated) DEVICE — BANDAGE COMPR L5YDXW2IN FOAM CO FLX

## (undated) DEVICE — SUT ETHLN 3-0 18IN PS1 BLK --

## (undated) DEVICE — CANNULA NSL ORAL AD FOR CAPNOFLEX CO2 O2 AIRLFE

## (undated) DEVICE — GLIDESHEATH SLENDER STAINLESS STEEL KIT: Brand: GLIDESHEATH SLENDER

## (undated) DEVICE — MCLASS OSCILLATING SAW BLADE 19 X 1.27 (0.050") X 90 MM: Brand: MCLASS

## (undated) DEVICE — Device

## (undated) DEVICE — CLOSURE SKIN FACILITATES COMPATIBILITY W/ CERTAIN IS DSG

## (undated) DEVICE — NDL PRT INJ NSAF BLNT 18GX1.5 --

## (undated) DEVICE — PRECISION THIN, OFFSET (5.5 X 0.38 X 25.0MM)

## (undated) DEVICE — CATHETER DIAG AD 5FR L110CM 145DEG COR GRY HYDRPHLC NYL

## (undated) DEVICE — DRAPE,TOP,102X53,STERILE: Brand: MEDLINE

## (undated) DEVICE — CLOSURE SKIN FLX NONINVASIVE PRELOC TECHNOLOGY FOR 24IN

## (undated) DEVICE — FOOT & ANKLE SOFT DR WOMACK: Brand: MEDLINE INDUSTRIES, INC.

## (undated) DEVICE — KENDALL RADIOLUCENT FOAM MONITORING ELECTRODE RECTANGULAR SHAPE: Brand: KENDALL

## (undated) DEVICE — BNDG ELAS COBAN 2INX5YD NS --

## (undated) DEVICE — Device: Brand: STABLECUT®

## (undated) DEVICE — PREP SKN CHLRAPRP APL 26ML STR --

## (undated) DEVICE — Device: Brand: POWER-FLO®

## (undated) DEVICE — SYR LR LCK 1ML GRAD NSAF 30ML --

## (undated) DEVICE — ZIMMER® STERILE DISPOSABLE TOURNIQUET CUFF PROTECTIVE SLEEVE (FOR USE WITH ZIMMER 34 IN. (86 CM) DISPOSABLE CUFF)

## (undated) DEVICE — SOLUTION IRRIG 1000ML 0.9% SOD CHL USP POUR PLAS BTL

## (undated) DEVICE — CATHETER COR DIAG 4.0 5FR 110CM 2 SIDE H

## (undated) DEVICE — VALVE HEMSTAT 8FR INNR LUMN CRSS SLT SEAL DSGN WATCHDOG

## (undated) DEVICE — PADDING CAST W4INXL4YD ST COT COHESIVE HND TEARABLE SPEC

## (undated) DEVICE — SYR 50ML LR LCK 1ML GRAD NSAF --

## (undated) DEVICE — GUIDEWIRE 035IN 210CM PTFE COAT FIX COR J TIP 15MM FIRM BODY

## (undated) DEVICE — ZIMMER® STERILE DISPOSABLE TOURNIQUET CUFF WITH PLC, DUAL PORT, SINGLE BLADDER, 34 IN. (86 CM)

## (undated) DEVICE — SOLUTION IRRIG 3000ML 0.9% SOD CHL FLX CONT 0797208] ICU MEDICAL INC]

## (undated) DEVICE — FLEXIBLE YANKAUER,MEDIUM TIP, NO VACUUM CONTROL: Brand: ARGYLE

## (undated) DEVICE — BANDAGE,GAUZE,CONFORMING,2"X75",STRL,LF: Brand: MEDLINE INDUSTRIES, INC.

## (undated) DEVICE — NON-ADHERING DRESSING: Brand: ADAPTIC®

## (undated) DEVICE — BLOCK BITE AD 60FR W/ VELC STRP ADDRESSES MOST PT AND

## (undated) DEVICE — SYSTEM CULT COLL OR TRNSPRT CLR DBL SWAB W/ MOD AERB AMIES

## (undated) DEVICE — BIPOLAR SEALER 23-112-1 AQM 6.0: Brand: AQUAMANTYS ®

## (undated) DEVICE — SOLUTION IV 1000ML 0.9% SOD CHL

## (undated) DEVICE — ZIMMER® STERILE DISPOSABLE TOURNIQUET CUFF WITH PROTECTIVE SLEEVE, DUAL PORT, SINGLE BLADDER, 34 IN. (86 CM)

## (undated) DEVICE — DRAPE C ARM W54XL84IN MINI FOR OEC 6800

## (undated) DEVICE — REM POLYHESIVE ADULT PATIENT RETURN ELECTRODE: Brand: VALLEYLAB

## (undated) DEVICE — BUTTON SWITCH PENCIL BLADE ELECTRODE, HOLSTER: Brand: EDGE

## (undated) DEVICE — TOTAL TRAY, DB, 100% SILI FOLEY, 16FR 10: Brand: MEDLINE

## (undated) DEVICE — BAND COMPR L24CM REG CLR PLAS HEMSTAT EXT HK AND LOOP RETEN

## (undated) DEVICE — SUTURE VCRL SZ 2-0 L27IN ABSRB UD L36MM CP-1 1/2 CIR REV J266H

## (undated) DEVICE — SUTURE ETHBND EXCEL SZ 2 L30IN NONABSORBABLE GRN L40MM V-37 MX69G

## (undated) DEVICE — Z DISCONTINUED USE 2744636  DRESSING AQUACEL 14 IN ALG W3.5XL14IN POLYUR FLM CVR W/ HYDRCOLL

## (undated) DEVICE — T4 HOOD

## (undated) DEVICE — PACK PROCEDURE SURG TOT KNEE

## (undated) DEVICE — HANDPIECE SET WITH COAXIAL HIGH FLOW TIP AND SUCTION TUBE: Brand: INTERPULSE

## (undated) DEVICE — YANKAUER,BULB TIP,W/O VENT,RIGID,STERILE: Brand: MEDLINE

## (undated) DEVICE — BLADE SAW W12.5XL73.5MM THK0.8MM CUT THK1MM RECIP FOR L BNE

## (undated) DEVICE — GLOVE SURG SZ 8 L12IN FNGR THK79MIL GRN LTX FREE

## (undated) DEVICE — CONTAINER PREFIL FRMLN 40ML --

## (undated) DEVICE — RADIFOCUS OPTITORQUE ANGIOGRAPHIC CATHETER: Brand: OPTITORQUE

## (undated) DEVICE — BANDAGE COMPR SELF ADH 5 YDX4 IN TAN STRL PREMIERPRO LF

## (undated) DEVICE — SYR 5ML 1/5 GRAD LL NSAF LF --

## (undated) DEVICE — SUTURE ABS ANTIBACT 1-0 CTX 24IN STRATAFIX PDS+ SXPP1A445

## (undated) DEVICE — SYR 50ML SLIP TIP NSAF LF STRL --

## (undated) DEVICE — DEVICE COMPR REG 24 CM VASC BND

## (undated) DEVICE — GLOVE SURG SZ 65 CRM LTX FREE POLYISOPRENE POLYMER BEAD ANTI

## (undated) DEVICE — GUIDE SZ ANCIL FOR CONT COMPR IMPL

## (undated) DEVICE — BNDG,ELSTC,MATRIX,STRL,3"X5YD,LF,HOOK&LP: Brand: MEDLINE

## (undated) DEVICE — Device: Brand: JELCO

## (undated) DEVICE — BANDAGE,ELASTIC,ESMARK,STERILE,4"X9',LF: Brand: MEDLINE

## (undated) DEVICE — TRAY PREP DRY W/ PREM GLV 2 APPL 6 SPNG 2 UNDPD 1 OVERWRAP

## (undated) DEVICE — 2000CC GUARDIAN II: Brand: GUARDIAN

## (undated) DEVICE — SUTURE STRATAFIX SPRL SZ 1 L14IN ABSRB VLT L48CM CTX 1/2 SXPD2B405

## (undated) DEVICE — 4-PORT MANIFOLD: Brand: NEPTUNE 2

## (undated) DEVICE — SYR 3ML LL TIP 1/10ML GRAD --

## (undated) DEVICE — ZIMMER® STERILE DISPOSABLE TOURNIQUET CUFF WITH PLC, DUAL PORT, SINGLE BLADDER, 18 IN. (46 CM)

## (undated) DEVICE — CANISTER, RIGID, 2000CC: Brand: MEDLINE INDUSTRIES, INC.

## (undated) DEVICE — 7MM BONE GRAFT DRILL ASSY.: Brand: ACUMED

## (undated) DEVICE — 3000CC GUARDIAN II: Brand: GUARDIAN

## (undated) DEVICE — SUTURE MCRYL SZ 2-0 L27IN ABSRB UD CP-1 1 L36MM 1/2 CIR REV Y266H

## (undated) DEVICE — SOLUTION IV 500ML 0.9% SOD CHL FLX CONT

## (undated) DEVICE — SOLUTION IV DEXTROSE/SALINE 5%-0.9% 500ML - 500ML

## (undated) DEVICE — PRESSURE GUIDEWIRE: Brand: COMET™ II

## (undated) DEVICE — SUTURE MCRYL SZ 3-0 L27IN ABSRB UD L19MM PS-2 3/8 CIR PRIM Y427H

## (undated) DEVICE — CONNECTOR TBNG OD5-7MM O2 END DISP

## (undated) DEVICE — GLOVE SURG SZ 65 L12IN FNGR THK79MIL GRN LTX FREE

## (undated) DEVICE — SPONGE GZ W4XL4IN COT 12 PLY TYP VII WVN C FLD DSGN

## (undated) DEVICE — FORCEPS BX L240CM JAW DIA2.8MM L CAP W/ NDL MIC MESH TOOTH

## (undated) DEVICE — TOTAL 1-LAYER TRAY, LATEX FOLEY, 16FR 10: Brand: MEDLINE

## (undated) DEVICE — SUTURE MCRYL SZ 3-0 L27IN ABSRB UD L24MM PS-1 3/8 CIR PRIM Y936H

## (undated) DEVICE — SUTURE VCRL SZ 1 L27IN ABSRB UD L36MM CP-1 1/2 CIR REV CUT J268H

## (undated) DEVICE — STERILE PRESSURE PROTECTOR PAD® FOR DE MAYO UNIVERSAL DISTRACTOR® (10/CASE): Brand: DE MAYO UNIVERSAL DISTRACTOR®

## (undated) DEVICE — 450 ML BOTTLE OF 0.05% CHLORHEXIDINE GLUCONATE IN 99.95% STERILE WATER FOR IRRIGATION, USP AND APPLICATOR.: Brand: IRRISEPT ANTIMICROBIAL WOUND LAVAGE

## (undated) DEVICE — CURETTE BNE CEM 10IN DISP --